# Patient Record
Sex: MALE | Race: WHITE | NOT HISPANIC OR LATINO | Employment: FULL TIME | URBAN - METROPOLITAN AREA
[De-identification: names, ages, dates, MRNs, and addresses within clinical notes are randomized per-mention and may not be internally consistent; named-entity substitution may affect disease eponyms.]

---

## 2017-01-19 DIAGNOSIS — Z86.718 PERSONAL HISTORY OF DVT (DEEP VEIN THROMBOSIS): Primary | ICD-10-CM

## 2017-01-19 DIAGNOSIS — D68.61 ANTIPHOSPHOLIPID ANTIBODY SYNDROME (H): ICD-10-CM

## 2017-03-14 ENCOUNTER — TELEPHONE (OUTPATIENT)
Dept: FAMILY MEDICINE | Facility: CLINIC | Age: 27
End: 2017-03-14

## 2017-03-14 NOTE — TELEPHONE ENCOUNTER
Spoke with VINAY BernalMisenheimer , ph# [phone].  ID# [id number]  PA approved for Amphetamine 10mg for 36 months.  [facility] pharmacy ph#[phone] notified  .

## 2017-03-24 DIAGNOSIS — I10 BENIGN ESSENTIAL HYPERTENSION: ICD-10-CM

## 2017-03-24 RX ORDER — LOSARTAN POTASSIUM 50 MG/1
50 TABLET ORAL DAILY
Start: 2017-03-24

## 2017-03-24 RX ORDER — HYDROCHLOROTHIAZIDE 25 MG/1
25 TABLET ORAL DAILY
Start: 2017-03-24

## 2017-03-24 NOTE — TELEPHONE ENCOUNTER
Original Rx for both [facility] [address]  [facility].  Informed them where original Rx sent and to transfer if patient requests.  Kaylie Wilde RN

## 2017-03-24 NOTE — TELEPHONE ENCOUNTER
Lasartan Potassium 50MG      Last Written Prescription Date: 11/9/16  Last Fill Quantity: 90, # refills: 3  Last Office Visit with G, [facility] or [facility] prescribing provider: 12/16/16       Potassium   Date Value Ref Range Status   12/16/2016 3.9 3.4 - 5.3 mmol/L Final     Creatinine   Date Value Ref Range Status   12/16/2016 0.97 0.66 - 1.25 mg/dL Final     BP Readings from Last 3 Encounters:   12/16/16 116/80   11/09/16 (!) 142/92   08/18/16 128/82

## 2017-03-24 NOTE — TELEPHONE ENCOUNTER
Hydrochlorothiazide 25MG      Last Written Prescription Date: 12/16/16  Last Fill Quantity: 90, # refills: 3  Last Office Visit with G, P or [facility] prescribing provider: 12/16/16       Potassium   Date Value Ref Range Status   12/16/2016 3.9 3.4 - 5.3 mmol/L Final     Creatinine   Date Value Ref Range Status   12/16/2016 0.97 0.66 - 1.25 mg/dL Final     BP Readings from Last 3 Encounters:   12/16/16 116/80   11/09/16 (!) 142/92   08/18/16 128/82

## 2017-03-30 DIAGNOSIS — I10 BENIGN ESSENTIAL HYPERTENSION: ICD-10-CM

## 2017-03-30 RX ORDER — HYDROCHLOROTHIAZIDE 25 MG/1
TABLET ORAL
Start: 2017-03-30

## 2017-03-30 NOTE — TELEPHONE ENCOUNTER
Different pharmacy requesting refill  Denied  Rx sent to another [facility] in [facility] (ph [phone]) on 12/16/2016 for 1 year, please transfer if patient wants to fill in [place] GOMEZ RN

## 2017-04-11 ENCOUNTER — OFFICE VISIT (OUTPATIENT)
Dept: FAMILY MEDICINE | Facility: CLINIC | Age: 27
End: 2017-04-11
Payer: COMMERCIAL

## 2017-04-11 VITALS
OXYGEN SATURATION: 99 % | RESPIRATION RATE: 15 BRPM | DIASTOLIC BLOOD PRESSURE: 82 MMHG | HEART RATE: 94 BPM | TEMPERATURE: 98.4 F | SYSTOLIC BLOOD PRESSURE: 130 MMHG | BODY MASS INDEX: 36.01 KG/M2 | HEIGHT: 70 IN | WEIGHT: 251.5 LBS

## 2017-04-11 DIAGNOSIS — I10 BENIGN ESSENTIAL HYPERTENSION: ICD-10-CM

## 2017-04-11 DIAGNOSIS — F90.0 ATTENTION DEFICIT HYPERACTIVITY DISORDER (ADHD), PREDOMINANTLY INATTENTIVE TYPE: Primary | ICD-10-CM

## 2017-04-11 PROCEDURE — 99214 OFFICE O/P EST MOD 30 MIN: CPT | Performed by: FAMILY MEDICINE

## 2017-04-11 RX ORDER — DEXTROAMPHETAMINE SACCHARATE, AMPHETAMINE ASPARTATE, DEXTROAMPHETAMINE SULFATE AND AMPHETAMINE SULFATE 2.5; 2.5; 2.5; 2.5 MG/1; MG/1; MG/1; MG/1
10 TABLET ORAL 2 TIMES DAILY
Qty: 60 TABLET | Refills: 0 | Status: SHIPPED | OUTPATIENT
Start: 2017-06-15 | End: 2017-04-11

## 2017-04-11 RX ORDER — DEXTROAMPHETAMINE SACCHARATE, AMPHETAMINE ASPARTATE, DEXTROAMPHETAMINE SULFATE AND AMPHETAMINE SULFATE 2.5; 2.5; 2.5; 2.5 MG/1; MG/1; MG/1; MG/1
10 TABLET ORAL 2 TIMES DAILY
Qty: 60 TABLET | Refills: 0 | Status: SHIPPED | OUTPATIENT
Start: 2017-07-17 | End: 2017-04-11

## 2017-04-11 RX ORDER — HYDROCHLOROTHIAZIDE 25 MG/1
25 TABLET ORAL DAILY
Qty: 90 TABLET | Refills: 3 | Status: SHIPPED | OUTPATIENT
Start: 2017-04-11 | End: 2018-05-09

## 2017-04-11 RX ORDER — DEXTROAMPHETAMINE SACCHARATE, AMPHETAMINE ASPARTATE, DEXTROAMPHETAMINE SULFATE AND AMPHETAMINE SULFATE 2.5; 2.5; 2.5; 2.5 MG/1; MG/1; MG/1; MG/1
10 TABLET ORAL 2 TIMES DAILY
Qty: 60 TABLET | Refills: 0 | Status: SHIPPED | OUTPATIENT
Start: 2017-05-15 | End: 2017-04-11

## 2017-04-11 RX ORDER — DEXTROAMPHETAMINE SACCHARATE, AMPHETAMINE ASPARTATE, DEXTROAMPHETAMINE SULFATE AND AMPHETAMINE SULFATE 2.5; 2.5; 2.5; 2.5 MG/1; MG/1; MG/1; MG/1
10 TABLET ORAL 2 TIMES DAILY
Qty: 60 TABLET | Refills: 0 | Status: SHIPPED | OUTPATIENT
Start: 2017-08-17 | End: 2019-04-01

## 2017-04-11 NOTE — NURSING NOTE
"Chief Complaint   Patient presents with     Recheck Medication     HCTZ     /82  Pulse 94  Temp 98.4  F (36.9  C) (Oral)  Resp 15  Ht 5' 10\" (1.778 m)  Wt 251 lb 8 oz (114.1 kg)  SpO2 99%  BMI 36.09 kg/m2 Estimated body mass index is 36.09 kg/(m^2) as calculated from the following:    Height as of this encounter: 5' 10\" (1.778 m).    Weight as of this encounter: 251 lb 8 oz (114.1 kg).  bp completed using cuff size: large      left    Health Maintenance Due Pending Provider Review:  NONE    n/a    Norma Snyder MA  [facility]  "

## 2017-04-11 NOTE — MR AVS SNAPSHOT
After Visit Summary   4/11/2017    Tone Marley    MRN: [id number]           Patient Information     Date Of Birth          1990        Visit Information        Provider Department      4/11/2017 3:30 PM Yara Faust MD [facility]        Today's Diagnoses     Attention deficit hyperactivity disorder (ADHD), predominantly inattentive type    -  1    Benign essential hypertension           Follow-ups after your visit        Follow-up notes from your care team     Return in about 4 months (around 8/11/2017).      Who to contact     If you have questions or need follow up information about today's clinic visit or your schedule please contact [facility] directly at [phone].  Normal or non-critical lab and imaging results will be communicated to you by MyChart, letter or phone within 4 business days after the clinic has received the results. If you do not hear from us within 7 days, please contact the clinic through [employer] or phone. If you have a critical or abnormal lab result, we will notify you by phone as soon as possible.  Submit refill requests through [employer] or call your pharmacy and they will forward the refill request to us. Please allow 3 business days for your refill to be completed.          Additional Information About Your Visit        MyChart Information     [employer] gives you secure access to your electronic health record. If you see a primary care provider, you can also send messages to your care team and make appointments. If you have questions, please call your primary care clinic.  If you do not have a primary care provider, please call [phone] and they will assist you.        Care EveryWhere ID     This is your Care EveryWhere ID. This could be used by other organizations to access your [place] medical records  [id number]        Your Vitals Were     Pulse Temperature Respirations Height Pulse Oximetry BMI (Body Mass Index)    94  "98.4  F (36.9  C) (Oral) 15 5' 10\" (1.778 m) 99% 36.09 kg/m2       Blood Pressure from Last 3 Encounters:   04/11/17 130/82   12/16/16 116/80   11/09/16 (!) 142/92    Weight from Last 3 Encounters:   04/11/17 251 lb 8 oz (114.1 kg)   12/16/16 243 lb 11.2 oz (110.5 kg)   11/09/16 249 lb (112.9 kg)              Today, you had the following     No orders found for display         Today's Medication Changes          These changes are accurate as of: 4/11/17  6:07 PM.  If you have any questions, ask your nurse or doctor.               These medicines have changed or have updated prescriptions.        Dose/Directions    * amphetamine-dextroamphetamine 10 MG per tablet   Commonly known as:  ADDERALL   This may have changed:  Another medication with the same name was added. Make sure you understand how and when to take each.   Used for:  Attention deficit hyperactivity disorder (ADHD), predominantly inattentive type   Changed by:  Yara Faust MD        Dose:  10 mg   Take 1 tablet (10 mg) by mouth 2 times daily   Quantity:  60 tablet   Refills:  0       * amphetamine-dextroamphetamine 10 MG per tablet   Commonly known as:  ADDERALL   This may have changed:  You were already taking a medication with the same name, and this prescription was added. Make sure you understand how and when to take each.   Used for:  Attention deficit hyperactivity disorder (ADHD), predominantly inattentive type   Changed by:  Yara Faust MD        Dose:  10 mg   Start taking on:  8/17/2017   Take 1 tablet (10 mg) by mouth 2 times daily   Quantity:  60 tablet   Refills:  0       * Notice:  This list has 2 medication(s) that are the same as other medications prescribed for you. Read the directions carefully, and ask your doctor or other care provider to review them with you.         Where to get your medicines      These medications were sent to [facility]/pharmacy #[id number] - [place] - [address]  [address] [place] " [address]     Phone:  [phone]     hydrochlorothiazide 25 MG tablet         Some of these will need a paper prescription and others can be bought over the counter.  Ask your nurse if you have questions.     Bring a paper prescription for each of these medications     amphetamine-dextroamphetamine 10 MG per tablet                Primary Care Provider Office Phone # Fax #    Yara Elvis Faust [phone]       [facility] [address] [address]        Thank you!     Thank you for choosing [facility]  for your care. Our goal is always to provide you with excellent care. Hearing back from our patients is one way we can continue to improve our services. Please take a few minutes to complete the written survey that you may receive in the mail after your visit with us. Thank you!             Your Updated Medication List - Protect others around you: Learn how to safely use, store and throw away your medicines at www.disposemymeds.org.          This list is accurate as of: 4/11/17  6:07 PM.  Always use your most recent med list.                   Brand Name Dispense Instructions for use    * amphetamine-dextroamphetamine 10 MG per tablet    ADDERALL    60 tablet    Take 1 tablet (10 mg) by mouth 2 times daily       * amphetamine-dextroamphetamine 10 MG per tablet   Start taking on:  8/17/2017    ADDERALL    60 tablet    Take 1 tablet (10 mg) by mouth 2 times daily       COMPRESSION STOCKINGS     2 each    Please wear compression stockings in both lower extremities most time during the day and take them off at night.       hydrochlorothiazide 25 MG tablet    HYDRODIURIL    90 tablet    Take 1 tablet (25 mg) by mouth daily       losartan 50 MG tablet    COZAAR    90 tablet    Take 1 tablet (50 mg) by mouth daily       rivaroxaban ANTICOAGULANT 20 MG Tabs tablet    XARELTO    30 tablet    Take 1 tablet (20 mg) by mouth daily (with dinner)       * Notice:  This  list has 2 medication(s) that are the same as other medications prescribed for you. Read the directions carefully, and ask your doctor or other care provider to review them with you.

## 2017-04-11 NOTE — PROGRESS NOTES
"  SUBJECTIVE:                                                    Tone Marley is a 27 year old male who presents to clinic today for the following health issues:  History of attention deficit hyperactivity disorder  Need refill on adderall in a month. 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  Had positive antiphospholipid but rest of the lupus marker were negative  History od deep venous thrombosis now on xeralto  Has plans to go over  The  need for  plaquenil with rheumatologist      Chief Complaint   Patient presents with     Recheck Medication         Current Outpatient Prescriptions   Medication     hydrochlorothiazide (HYDRODIURIL) 25 MG tablet     rivaroxaban ANTICOAGULANT (XARELTO) 20 MG TABS tablet     amphetamine-dextroamphetamine (ADDERALL) 10 MG tablet     losartan (COZAAR) 50 MG tablet     COMPRESSION STOCKINGS     [DISCONTINUED] hydrochlorothiazide (HYDRODIURIL) 25 MG tablet     No current facility-administered medications for this visit.        Past medical and family history,medications, allergies and problem list reviewed       ROS: 10 point ROS neg other than the symptoms noted above in the HPI.    EXAM:/82  Pulse 94  Resp 15  Ht 5' 10\" (1.778 m)  Wt 251 lb 8 oz (114.1 kg)  SpO2 99%  BMI 36.09 kg/m2  Constitutional: healthy, alert and no distress   Head: Normocephalic. No masses, lesions, " tenderness or abnormalities  Cardiovascular: negative,  RRR. No murmurs, clicks gallops or rub  Respiratory:  Lungs clear.no wheezing, crackles.  Abdomen: Abdomen soft, non-tender.no organomegaly, No CVA tenderness.  NEURO: Gait normal. Reflexes normal and symmetric. Sensation grossly WNL.  Psychiatric: mentation appears normal and affect normal/bright    ASSESSMENT / PLAN:  (I10) Benign essential hypertension    Plan: hydrochlorothiazide (HYDRODIURIL) 25 MG tablet once daily  Refilled for 1 year  We also discussed changes in wt- he reports its been due to eating out more  He reports he has moved  In with his GF and they have plans to get better idea with healthy eating and making food at [facility]- a trail of blue [employer]ron         (F90.0) Attention deficit hyperactivity disorder (ADHD), (primary encounter diagnosis)    predominantly inattentive type (primary encounter diagnosis) stable at Adderall 10 mg BID  Return office visit  q 4 months   amphetamine-dextroamphetamine (ADDERALL) 10 MG twice daily-gives himself medications free day and sometimes ablle to get by with once daily dose  Max tab 60/month  MNPMP-completed - no concerns     antiphospholipid syndrome  History of deep venous thrombosis- on long term Xeralto and no concerns  Has plans to follow up with the rheumatologist as plaquenil too expensive      Potential medication side effects were discussed with the patient; let me know if any occur.    The patient indicates understanding of these issues and agrees with the plan.      Yara Faust

## 2017-05-02 ENCOUNTER — APPOINTMENT (OUTPATIENT)
Dept: CT IMAGING | Facility: CLINIC | Age: 27
End: 2017-05-02
Attending: NURSE PRACTITIONER
Payer: COMMERCIAL

## 2017-05-02 ENCOUNTER — HOSPITAL ENCOUNTER (EMERGENCY)
Facility: CLINIC | Age: 27
Discharge: HOME | End: 2017-05-02
Attending: NURSE PRACTITIONER | Admitting: NURSE PRACTITIONER
Payer: COMMERCIAL

## 2017-05-02 VITALS
SYSTOLIC BLOOD PRESSURE: 149 MMHG | BODY MASS INDEX: 34.27 KG/M2 | TEMPERATURE: 98.4 F | HEART RATE: 83 BPM | HEIGHT: 72 IN | OXYGEN SATURATION: 97 % | DIASTOLIC BLOOD PRESSURE: 95 MMHG | WEIGHT: 253 LBS | RESPIRATION RATE: 22 BRPM

## 2017-05-02 DIAGNOSIS — R06.02 SHORTNESS OF BREATH: ICD-10-CM

## 2017-05-02 LAB
ANION GAP SERPL CALCULATED.3IONS-SCNC: 9 MMOL/L (ref 3–14)
BUN SERPL-MCNC: 18 MG/DL (ref 7–30)
CALCIUM SERPL-MCNC: 9.4 MG/DL (ref 8.5–10.1)
CHLORIDE SERPL-SCNC: 102 MMOL/L (ref 94–109)
CO2 SERPL-SCNC: 28 MMOL/L (ref 20–32)
CREAT SERPL-MCNC: 0.87 MG/DL (ref 0.66–1.25)
ERYTHROCYTE [DISTWIDTH] IN BLOOD BY AUTOMATED COUNT: 13.1 % (ref 10–15)
GFR SERPL CREATININE-BSD FRML MDRD: ABNORMAL ML/MIN/1.7M2
GLUCOSE SERPL-MCNC: 102 MG/DL (ref 70–99)
HCT VFR BLD AUTO: 42.4 % (ref 40–53)
HGB BLD-MCNC: 15.4 G/DL (ref 13.3–17.7)
INTERPRETATION ECG - MUSE: NORMAL
MCH RBC QN AUTO: 31 PG (ref 26.5–33)
MCHC RBC AUTO-ENTMCNC: 36.3 G/DL (ref 31.5–36.5)
MCV RBC AUTO: 86 FL (ref 78–100)
PLATELET # BLD AUTO: 196 10E9/L (ref 150–450)
POTASSIUM SERPL-SCNC: 3.6 MMOL/L (ref 3.4–5.3)
RBC # BLD AUTO: 4.96 10E12/L (ref 4.4–5.9)
SODIUM SERPL-SCNC: 139 MMOL/L (ref 133–144)
WBC # BLD AUTO: 4.8 10E9/L (ref 4–11)

## 2017-05-02 PROCEDURE — 25500064 ZZH RX 255 OP 636: Performed by: NURSE PRACTITIONER

## 2017-05-02 PROCEDURE — 93005 ELECTROCARDIOGRAM TRACING: CPT

## 2017-05-02 PROCEDURE — 71260 CT THORAX DX C+: CPT

## 2017-05-02 PROCEDURE — 85027 COMPLETE CBC AUTOMATED: CPT | Performed by: NURSE PRACTITIONER

## 2017-05-02 PROCEDURE — 80048 BASIC METABOLIC PNL TOTAL CA: CPT | Performed by: NURSE PRACTITIONER

## 2017-05-02 PROCEDURE — 25000125 ZZHC RX 250: Performed by: NURSE PRACTITIONER

## 2017-05-02 PROCEDURE — 99285 EMERGENCY DEPT VISIT HI MDM: CPT | Mod: 25

## 2017-05-02 RX ORDER — IOPAMIDOL 755 MG/ML
80 INJECTION, SOLUTION INTRAVASCULAR ONCE
Status: COMPLETED | OUTPATIENT
Start: 2017-05-02 | End: 2017-05-02

## 2017-05-02 RX ADMIN — SODIUM CHLORIDE 100 ML: 9 INJECTION, SOLUTION INTRAVENOUS at 17:52

## 2017-05-02 RX ADMIN — IOPAMIDOL 80 ML: 755 INJECTION, SOLUTION INTRAVENOUS at 17:55

## 2017-05-02 ASSESSMENT — ENCOUNTER SYMPTOMS
SHORTNESS OF BREATH: 1
LIGHT-HEADEDNESS: 0
DIAPHORESIS: 0

## 2017-05-02 NOTE — ED AVS SNAPSHOT
Emergency Department    [address] [place] [address]    Phone:  [phone]    Fax:  [phone]                                       Tone Marley   MRN: [id number]    Department:   Emergency Department   Date of Visit:  5/2/2017           After Visit Summary Signature Page     I have received my discharge instructions, and my questions have been answered. I have discussed any challenges I see with this plan with the nurse or doctor.    ..........................................................................................................................................  Patient/Patient Representative Signature      ..........................................................................................................................................  Patient Representative Print Name and Relationship to Patient    ..................................................               ................................................  Date                                            Time    ..........................................................................................................................................  Reviewed by Signature/Title    ...................................................              ..............................................  Date                                                            Time

## 2017-05-02 NOTE — ED PROVIDER NOTES
History     Chief Complaint:  Shortness of Breath    HPI   Tone Marley is a 27 year old male with a history of DVT/PE, currently taking Xarelto, who presents with shortness of breath. Tone has an antiphospholipid genetic predisposition for DVT's. The patient states that he was recently out in [place] hiking in the mountains for a week. While hiking he developed shortness of breath at altitude. When he came home two days ago he missed a day of Xarelto. Due to continued shortness of breath Tone was prompted to come into the ER for evaluation. He does state that symptoms have improved mildly. Here in the ER the patient endorses some left leg discomfort no swelling. His right leg is where all of his prior DVT's have been.  He does wear compression socks. He denies chest pain, palpitatipons, light headedness and diaphoresis.     Allergies:  The patient has no known drug allergies.     Medications:    Hydrodiuril  Xarelto  Cozaar  Adderall    Past Medical History:    Chronic anticoagulation  Postphlebitic syndrome  PE  Hyperlipidemia  Antiphospholipid syndrome  HTN  ADHD     Past Surgical History:    [place] teeth removal    Family History:    Asthma    Social History:  Relationship status: single  Tobacco use: neg  Alcohol use: pos  The patient presents with his girlfriend.     Review of Systems   Constitutional: Negative for diaphoresis.   Respiratory: Positive for shortness of breath.    Cardiovascular: Negative for chest pain and leg swelling.   Neurological: Negative for light-headedness.   All other systems reviewed and are negative.    Physical Exam   First Vitals:  BP: (!) 157/104  Pulse: 96  Temp: 98.4  F (36.9  C)  Resp: 22  Height: 182.9 cm (6')  Weight: 114.8 kg (253 lb)  SpO2: 98 %      Patient Vitals for the past 24 hrs:   BP Temp Temp src Pulse Resp SpO2 Height Weight   05/02/17 1859 (!) 149/95 - - - - 97 % - -   05/02/17 1712 (!) 157/104 98.4  F (36.9  C) Oral 96 22 98 % 1.829 m (6') 114.8 kg  (253 lb)       Physical Exam  General: Alert, No obvious discomfort, well kept  Eyes: PERRL, conjunctivae pink no scleral icterus or conjunctival injection  ENT:   Moist mucus membranes, posterior oropharynx clear without erythema or exudates, No lymphadenopathy, Normal voice  Resp:  Lungs clear to auscultation bilaterally, no crackles/rubs/wheezes. Good air movement  CV:  Normal rate and rhythm, no murmurs/rubs/gallops  GI:  Abdomen soft and non-distended.  Normoactive BS.  No tenderness, guarding or rebound, No masses  Skin:  Warm, dry.  No rashes or petechiae  Musculoskeletal: No peripheral edema or calf tenderness, Normal gross ROM   Neuro: Alert and oriented to person/place/time, normal sensation  Psychiatric: Normal affect, cooperative, good eye contact    Emergency Department Course     Imaging:  Radiographic findings were communicated with the patient who voiced understanding of the findings.  Chest CT Pulmonary Embolism, with contrast, per radiology:  1. Chronic mass or filling defect in the left lower lobe pulmonary artery, unchanged on three separate exams dating back to 2013. No evidence of acute pulmonary embolus.  2. Two small right lung nodules have been stable for more than two years and are considered benign.  3. Borderline cardiomegaly.    Laboratory:  CBC: WNL (WBC 4.8, HGB 15.4, )   CMP: glucose 102 (H), o/w WNL (Creatinine 0.87)     Emergency Department Course:  Nursing notes and vitals reviewed.  I performed an exam of the patient as documented above.  The above workup was undertaken.  1833: I rechecked the patient and discussed results.  Findings and plan explained to the Patient. Patient discharged home with instructions regarding supportive care, medications, and reasons to return. The importance of close follow-up was reviewed.    Impression & Plan      Medical Decision Making:  Tone Marley is a 27 year old male who presented for evaluation or shortness of breath. He has  antiphospholipid syndrome and he has had multiple DVT's and PE's in the past. He just returned from [place] and he has had shortness of breath so he became concerned for a new PE. His examination today shows normal vital signs. Laboratory tests are noncontributory. CT was obtained without indication for any acute process. He continues to have a chronic mass filling defect as well as unchanged lung nodes from the last two years. There is no indication for pneumonia, pneumothorax or pleural effusion. This may represent viral illness versus allergy symptoms. The patient does not have fevers. This is unlikely to represent any infection. History and physical unlikely to represent ACS. He will follow up with PCP in 2-3 days time if no improvements. He will return to the ER with increased shortness of breath, fevers or other concerns.     Diagnosis:    ICD-10-CM   1. Shortness of breath R06.02     Disposition:  Discharge to home with primary care follow up.    Jayden ARGUETA, am serving as a scribe on 5/2/2017 at 5:21 PM to personally document services performed by Jake Salcedo APRN, based on my observations and the provider's statements to me.     EMERGENCY DEPARTMENT       Jake Salcedo APRN CNP  05/02/17 1911

## 2017-05-02 NOTE — DISCHARGE INSTRUCTIONS
Shortness of Breath (Dyspnea)  Shortness of breath is the feeling that you can't catch your breath or get enough air. It is also known as dyspnea.  Dyspnea can be caused by many different conditions. They include:    Acute asthma attack.    Worsening of chronic lung diseases such as chronic bronchitis and emphysema.     Heart failure. This is when weak heart muscle allows extra fluid to collect in the lungs.    Panic attacks or anxiety. Fear can cause rapid breathing (hyperventilation).    Pneumonia, or an infection in the lung tissue.    Exposure to toxic substances, fumes, smoke, or certain medicines.    Blood clot in the lung (pulmonary embolism). This is often from a piece of blood clot in a deep vein of the leg (deep vein thrombosis) that breaks off and travels to the lungs.     Heart attack or heart-related chest pain (angina).    Anemia.    Collapsed lung (pneumothorax).    Dehydration.    Pregnancy.  Based on your visit today, the exact cause of your shortness of breath is not certain. Your tests don t show any of the serious causes of dyspnea. You may need other tests to find out if you have a serious problem. It s important to watch for any new symptoms or symptoms that get worse. Follow up with your healthcare provider as directed.  Home care  Follow these tips to take care of yourself at home:    When your symptoms are better, go back to your usual activities.    If you smoke, you should stop. Join a quit-smoking program or ask your healthcare provider for help.    Eat a healthy diet and get plenty of sleep.    Get regular exercise. Talk with your healthcare provider before starting to exercise, especially if you have other medical problems.    Cut down on the amount of caffeine and stimulants you consume.  Follow-up care  Follow up with your healthcare provider, or as advised.  If tests were done, you will be told if your treatment needs to be changed. You can call as directed for the  results.  (Note: If an X-ray was taken, a specialist will review it. You will be notified of any new findings that may affect your care.)  Call 911 or get immediate medical care  Shortness of breath may be a sign of a serious medical problem. For example, it may be a problem with your heart or lungs. Call 911 if you have worsening shortness of breath or trouble breathing, especially with any of the symptoms below:    You are confused or it s difficult to wake you.    You faint or lose consciousness.    You have a fast heartbeat, or your heartbeat is irregular.     You are coughing up blood.    You have pain in your chest, arm, shoulder, neck, or upper back.    You break out in a sweat.  When to seek medical advice  Call your healthcare provider right away if any of these occur:    Slight shortness of breath or wheezing     Redness, pain or swelling in your leg, arm, or other body area    Swelling in both legs or ankles    Fast weight gain    Dizziness or weakness    Fever of 100.4 F (38 C) or higher, or as directed by your healthcare provider    4648-8381 [employer]. [address] [place] [address]. All rights reserved. This information is not intended as a substitute for professional medical care. Always follow your healthcare professional's instructions.

## 2017-05-02 NOTE — LETTER
EMERGENCY DEPARTMENT  [address] [address]  [phone]    May 2, 2017    Tone Marley  [address] [address]  [phone] (home)     : 1990      To Whom it may concern:    Tone Marley was seen in our Emergency Department today, May 2, 2017. Possible viral illness. He may return to work/school.    Sincerely,        Jake Salcedo

## 2017-05-22 DIAGNOSIS — Z86.718 PERSONAL HISTORY OF DVT (DEEP VEIN THROMBOSIS): ICD-10-CM

## 2017-05-22 DIAGNOSIS — D68.61 ANTIPHOSPHOLIPID ANTIBODY SYNDROME (H): ICD-10-CM

## 2017-06-01 ENCOUNTER — OFFICE VISIT (OUTPATIENT)
Dept: HEMATOLOGY | Facility: CLINIC | Age: 27
End: 2017-06-01
Attending: PHYSICIAN ASSISTANT
Payer: COMMERCIAL

## 2017-06-01 VITALS
BODY MASS INDEX: 34.15 KG/M2 | OXYGEN SATURATION: 97 % | HEART RATE: 87 BPM | DIASTOLIC BLOOD PRESSURE: 80 MMHG | TEMPERATURE: 98.8 F | SYSTOLIC BLOOD PRESSURE: 127 MMHG | WEIGHT: 251.8 LBS

## 2017-06-01 DIAGNOSIS — I10 BENIGN ESSENTIAL HYPERTENSION: ICD-10-CM

## 2017-06-01 DIAGNOSIS — Z79.01 CHRONIC ANTICOAGULATION: ICD-10-CM

## 2017-06-01 DIAGNOSIS — D68.61 ANTIPHOSPHOLIPID ANTIBODY SYNDROME (H): Primary | ICD-10-CM

## 2017-06-01 DIAGNOSIS — Z86.711 HISTORY OF PULMONARY EMBOLISM: ICD-10-CM

## 2017-06-01 LAB
ALBUMIN SERPL-MCNC: 3.8 G/DL (ref 3.4–5)
ALP SERPL-CCNC: 104 U/L (ref 40–150)
ALT SERPL W P-5'-P-CCNC: 86 U/L (ref 0–70)
AST SERPL W P-5'-P-CCNC: 39 U/L (ref 0–45)
BILIRUB DIRECT SERPL-MCNC: 0.2 MG/DL (ref 0–0.2)
BILIRUB SERPL-MCNC: 0.8 MG/DL (ref 0.2–1.3)
PROT SERPL-MCNC: 7.9 G/DL (ref 6.8–8.8)

## 2017-06-01 PROCEDURE — 36415 COLL VENOUS BLD VENIPUNCTURE: CPT | Performed by: PHYSICIAN ASSISTANT

## 2017-06-01 PROCEDURE — 99211 OFF/OP EST MAY X REQ PHY/QHP: CPT

## 2017-06-01 PROCEDURE — 80076 HEPATIC FUNCTION PANEL: CPT | Performed by: PHYSICIAN ASSISTANT

## 2017-06-01 PROCEDURE — 99212 OFFICE O/P EST SF 10 MIN: CPT | Performed by: PHYSICIAN ASSISTANT

## 2017-06-01 ASSESSMENT — PAIN SCALES - GENERAL: PAINLEVEL: NO PAIN (0)

## 2017-06-01 NOTE — MR AVS SNAPSHOT
After Visit Summary   6/1/2017    Tone Marley    MRN: [id number]           Patient Information     Date Of Birth          1990        Visit Information        Provider Department      6/1/2017 10:00 AM Roque Kidd PA-C [facility] [place] [facility]      Today's Diagnoses     Antiphospholipid antibody syndrome (H)    -  1    Chronic anticoagulation        Benign essential hypertension        History of pulmonary embolism           Follow-ups after your visit        Future tests that were ordered for you today     Open Future Orders        Priority Expected Expires Ordered    Comprehensive metabolic panel Routine 12/1/2017 6/1/2018 6/1/2017            Who to contact     If you have questions or need follow up information about today's clinic visit or your schedule please contact [facility] [place] directly at [phone].  Normal or non-critical lab and imaging results will be communicated to you by MyChart, letter or phone within 4 business days after the clinic has received the results. If you do not hear from us within 7 days, please contact the clinic through MyChart or phone. If you have a critical or abnormal lab result, we will notify you by phone as soon as possible.  Submit refill requests through [employer] or call your pharmacy and they will forward the refill request to us. Please allow 3 business days for your refill to be completed.          Additional Information About Your Visit        MyChart Information     [employer] gives you secure access to your electronic health record. If you see a primary care provider, you can also send messages to your care team and make appointments. If you have questions, please call your primary care clinic.  If you do not have a primary care provider, please call [phone] and they will assist you.        Care EveryWhere ID     This is your Care EveryWhere ID. This could be used by other organizations to access your  [place] medical records  [id number]        Your Vitals Were     Pulse Temperature Pulse Oximetry BMI (Body Mass Index)          87 98.8  F (37.1  C) (Oral) 97% 34.15 kg/m2         Blood Pressure from Last 3 Encounters:   06/01/17 127/80   05/02/17 (!) 149/95   04/11/17 130/82    Weight from Last 3 Encounters:   06/01/17 114.2 kg (251 lb 12.8 oz)   05/02/17 114.8 kg (253 lb)   04/11/17 114.1 kg (251 lb 8 oz)              We Performed the Following     Hepatic panel        Primary Care Provider Office Phone # Fax #    Yara Faust [phone]       [address] [address]        Thank you!     Thank you for choosing [facility]  for your care. Our goal is always to provide you with excellent care. Hearing back from our patients is one way we can continue to improve our services. Please take a few minutes to complete the written survey that you may receive in the mail after your visit with us. Thank you!             Your Updated Medication List - Protect others around you: Learn how to safely use, store and throw away your medicines at www.disposemymeds.org.          This list is accurate as of: 6/1/17  1:01 PM.  Always use your most recent med list.                   Brand Name Dispense Instructions for use    * amphetamine-dextroamphetamine 10 MG per tablet    ADDERALL    60 tablet    Take 1 tablet (10 mg) by mouth 2 times daily       * amphetamine-dextroamphetamine 10 MG per tablet   Start taking on:  8/17/2017    ADDERALL    60 tablet    Take 1 tablet (10 mg) by mouth 2 times daily       COMPRESSION STOCKINGS     2 each    Please wear compression stockings in both lower extremities most time during the day and take them off at night.       hydrochlorothiazide 25 MG tablet    HYDRODIURIL    90 tablet    Take 1 tablet (25 mg) by mouth daily       losartan 50 MG tablet    COZAAR    90 tablet    Take 1 tablet (50 mg) by mouth daily        rivaroxaban ANTICOAGULANT 20 MG Tabs tablet    XARELTO    30 tablet    Take 1 tablet (20 mg) by mouth daily (with dinner)       * Notice:  This list has 2 medication(s) that are the same as other medications prescribed for you. Read the directions carefully, and ask your doctor or other care provider to review them with you.

## 2017-06-01 NOTE — LETTER
2017      RE: Tone Marley  [address]  [facility] [address]       [facility]  [address] [place] [address], [facility] [address], [address], [place] [address]  Phone: [phone], Fax: [phone].    Patient seen at: [facility].    Outpatient Visit Note:    Patient: Tone Marley  MRN: [id number]  : 1990  CAROLINE: 2017    Reason:  History of recurrent DVT and PE. APLS. On Chronic anticoagulation therapy. Here for his routine annual follow up visit.     Brief History:  This is a 27 year old male who suffered from a massive lower extremity DVT involving the right iliac vein and large left sided pulmonary embolism back when he was 13 years old and found to have antiphospholipid antibody syndrome (APLS), who is currently on long term anticoagulation therapy, returns to clinic for his routine annual follow up clinic visit. He was last seen by me back in 2016, please see my previous notes for his detail clinical history. In brief, back in , he suffered a deep vein thrombosis in the right external iliac vein with left lower lobe pulmonary embolism. In , he again suffered another DVT in his left lower extremity and was found to have APLS with his Lupus Anticoagulant being persistently positive. He was placed on Coumadin at the time. During our last visit with him back in May 2009, he was not quite compliant with his Coumadin monitoring. Eventually he was followed by the [facility] Coumadin monitoring clinic and he was doing better.      Because of his history of APLS, historically his Coumadin dosing was adjusted based on Factor II levels with a goal range of 15-25%.      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 At that time, we had him establish care with a hematologist in [facility].      He has maintained on Coumadin from  to . According to the patient, he only  had his INR monitored and was not utilizing Factor 2 or Chromogenic Factor X levels for monitoring. Then in Sept 2013, he apparently developed significant chest pain on the left side. He eventually was admitted and was evaluated by Dr. Antonio Turcios (Hematologist at [facility] in [place]). At the time, the patient was on Coumadin with his INR level within the 2.0-3.0 range. However, according to Dr. Turcios, his Factor II level was sub-therapeutic in the 50's%. At the time, a CT revealed large left lower lobe clot burden. Because of concern of Coumadin failure, the patient was subsequently placed on Enoxaparin for a few months. During a follow up visit with Dr. Turcios in early 2014, Dr. Turcios recommended that Tone should consider switching his anticoagulation therapy to Xarelto at 20 mg PO Qday. He has maintained on this since.      Tone moved back to [place] about 2 years ago and has since re-established care with us.      After our visit with him back in Feb 2014, we referred him to see Rheumatologist here at the [facility] in regard to Plaquenil use. The recommendation was to have him continue Plaquenil. We also referred the patient to see [facility], Dr. Jose Angel Nina, and the patient was recommended to be evaluated by a pulmonologist and potentially needing a right heart catheterization. Then back in 1/11/2015, he was admitted to [facility] for hemoptysis. Apparently retrospectively, he was apparently staying at a friend's basement at the time and apparently for some reason, the dryer, which was located in the basement, was found to have the exhaust pipe exiting within the basement. Hence he speculated that he had developed a severe coughing episode at the time leading up to his episode of hemoptysis.      Interim History:  Since I last saw him back in March 2016, he had continued with Xarelto with no bleeding complications. He has no issues with recurrent thromboembolism. He recently was in  [place] and developed some shortness of breath. He seek emergency department medical attention on 5/2/2017 for his shortness of breath here at the [facility]. A CT Chest was done showing chronic mass or filling defect in the left lower lobe pulmonary artery. This has not changed on three separate exams dating back to 2013. The patient was told to take Zyrtec and was discharged home. He eventually took the advise and took one tablet of Zyrtec and within one hour, his symptoms entirely resolved.     Denies any frequent nose bleeds. No issues with gum bleeding. Denies any hematuria or blood in stools. Denies any chest pain. No shortness of breath. Denies any swelling of the lower extremities. He has been wearing on and off compression stockings.     He recently discontinue Plaquenil on his own due to expensive cost of the medication. He has not seen his Rheumatologist in regard to this as of yet.    Medications:  Current Outpatient Prescriptions   Medication     rivaroxaban ANTICOAGULANT (XARELTO) 20 MG TABS tablet     hydrochlorothiazide (HYDRODIURIL) 25 MG tablet     [START ON 8/17/2017] amphetamine-dextroamphetamine (ADDERALL) 10 MG per tablet     amphetamine-dextroamphetamine (ADDERALL) 10 MG tablet     losartan (COZAAR) 50 MG tablet     COMPRESSION STOCKINGS     No current facility-administered medications for this visit.      Allergies:     Allergies   Allergen Reactions     Nkda [No Known Drug Allergies]      PmHx:  Past Medical History:   Diagnosis Date     ADHD (attention deficit hyperactivity disorder) 6/27/2012     Antiphospholipid antibody syndromes     at age 13     Antiphospholipid syndrome (H) 2/2/2012    On anticoagulation coumdin 10 mg once daily       History of pulmonary embolism 9/2/2013    Was diagnosed  Positive CT scan in emergency department   now Under care of Dr Turcios at [facility] at [facility],[phone] His nurse personal line [phone] Was on lovenox for 5 months  Now  For  past one month on xareltlo 20 mg once daily and plaqunil twice daily       Hyperlipidemia LDL goal <130 6/11/2013     Hypertension goal BP (blood pressure) < 140/90 6/19/12     Hypertension, uncontrolled 6/27/2012     Prehypertension 2/2/2012     Pulmonary embolism (H)     at age 13     Social History:  He is currently a pharmacist at [employer].     Family History:  Deferred.    Objective:  Pleasant in no acute distress.  Vitals: /80  Pulse 87  Temp 98.8  F (37.1  C) (Oral)  Wt 114.2 kg (251 lb 12.8 oz)  SpO2 97%  BMI 34.15 kg/m2  Exam:  Complete exam is not performed today.    Labs:    Component      Latest Ref Rng & Units 5/2/2017   Sodium      133 - 144 mmol/L 139   Potassium      3.4 - 5.3 mmol/L 3.6   Chloride      94 - 109 mmol/L 102   Carbon Dioxide      20 - 32 mmol/L 28   Anion Gap      3 - 14 mmol/L 9   Glucose      70 - 99 mg/dL 102 (H)   Urea Nitrogen      7 - 30 mg/dL 18   Creatinine      0.66 - 1.25 mg/dL 0.87   GFR Estimate      >60 mL/min/1.7m2 >90 . . .   GFR Estimate If Black      >60 mL/min/1.7m2 >90 . . .   Calcium      8.5 - 10.1 mg/dL 9.4   WBC      4.0 - 11.0 10e9/L 4.8   RBC Count      4.4 - 5.9 10e12/L 4.96   Hemoglobin      13.3 - 17.7 g/dL 15.4   Hematocrit      40.0 - 53.0 % 42.4   MCV      78 - 100 fl 86   MCH      26.5 - 33.0 pg 31.0   MCHC      31.5 - 36.5 g/dL 36.3   RDW      10.0 - 15.0 % 13.1   Platelet Count      150 - 450 10e9/L 196     Component      Latest Ref Rng & Units 1/11/2015   Sodium      133 - 144 mmol/L 135   Potassium      3.4 - 5.3 mmol/L 3.7   Chloride      94 - 109 mmol/L 108   Carbon Dioxide      20 - 32 mmol/L 28   Anion Gap      3 - 14 mmol/L <1 (L)   Glucose      70 - 99 mg/dL 87   Urea Nitrogen      7 - 30 mg/dL 11   Creatinine      0.66 - 1.25 mg/dL 0.96   GFR Estimate      >60 mL/min/1.7m2 >90 . . .   GFR Estimate If Black      >60 mL/min/1.7m2 >90 . . .   Calcium      8.5 - 10.1 mg/dL 8.6   Bilirubin Total      0.2 - 1.3 mg/dL 0.6   Albumin      3.4 -  5.0 g/dL 3.6   Protein Total      6.8 - 8.8 g/dL 6.7 (L)   Alkaline Phosphatase      40 - 150 U/L 87   ALT      0 - 70 U/L 74 (H)   AST      0 - 45 U/L 24       Imaging:  CT Chest on 5/2/2017:  1. Chronic mass or filling defect in the left lower lobe pulmonary artery, unchanged on three separate exams dating back to 2013. No evidence of acute pulmonary embolus. 2. Two small right lung nodules have been stable for more than two years and are considered benign. 3. Borderline cardiomegaly.    Assessment:  In summary, Tone is a 27 year old male with a history of recurrent lower extremity DVT and pulmonary embolism who also has Antiphospholipid Antibody Syndrome, currently on chronic anticoagulation therapy with Xarelto at 20 mg PO Qday, returns to clinic today for his routine annual follow up clinic visit. Tone in general is doing very well on Xarelto and reports that he has been very compliant with taking the medication. He has no bleeding issues with Xarelto use and more importantly has not had a recurrent venous thromboembolic event.     Diagnosis:  1. History of recurrent DVT/PE.  2. Antiphospholipid Antibody Syndrome (APLS).  3. Chronic anticoagulation therapy with Xarelto.  4. Hypertension.     Plan:  Tone remains to be a good candidate to continue extended anticoagulation therapy with Xarelto at 20 mg PO Qday. I have reiterate with him about scheduling a follow up clinic visit with Rheumatology about his self discontinuation of Plaquenil.     I will recheck his LFT's today.    He knows to contact our center if he should need any invasive or surgical procedures in the future. At which time, we can provide him with anticoagulation management plan surrounding these procedures.    Otherwise, I will plan on seeing him back annually.    Alejandra Escoto, nurse clinician is present throughout the entire clinic visit.     Total Time Spent:  14 minutes, all 14 minutes was spent on face-to-face consultation with the  patient and coordination of care in regard to his APLS, recurrent DVT/PE and anticoagulation therapy management.    Time IN: 10:04  Time OUT: 10:18      Roque Kidd PA-C, MPAS  Physician Assistant  [facility].

## 2017-06-01 NOTE — NURSING NOTE
Patient in clinic for a follow up bleeding/clotting visit. Pain 0/10. Vitals taken, allergies and medications reviewed. 5 minutes spent with patient.    Meagan Diaz CMA

## 2017-06-01 NOTE — NURSING NOTE
Tone Marley  MRN: [id number]  Male, 27 year old, 1990  APS, DVT/PE    Saw Tone with BRYAN Burgos today.  He is currently on long-term anticoagulation with Xarelto 20 mg daily.  He is having no bleeding issues. He stopped Plaquinil in November 2016 because of insurance changes & high out of pocket expenses.  He is planning on seeing Rheumatology in the near future to discuss this.  We are checking LFT's today to ensure proper clearance of Xarelto.  Kidney function in recent past was normal.  We will see him back in 1 year to discuss plan.  He is to call me with questions during the year.  Taryn Escoto, RN, MSN -Nurse Clinician, [facility]

## 2017-06-01 NOTE — PROGRESS NOTES
[facility]  [address] [place] [address] [address], [address] [address]  Phone: [phone], Fax: [phone].    Patient seen at: [facility].    Outpatient Visit Note:    Patient: Tone Marley  MRN: [id number]  : 1990  CAROLINE: 2017    Reason:  History of recurrent DVT and PE. APLS. On Chronic anticoagulation therapy. Here for his routine annual follow up visit.     Brief History:  This is a 27 year old male who suffered from a massive lower extremity DVT involving the right iliac vein and large left sided pulmonary embolism back when he was 13 years old and found to have antiphospholipid antibody syndrome (APLS), who is currently on long term anticoagulation therapy, returns to clinic for his routine annual follow up clinic visit. He was last seen by me back in 2016, please see my previous notes for his detail clinical history. In brief, back in , he suffered a deep vein thrombosis in the right external iliac vein with left lower lobe pulmonary embolism. In , he again suffered another DVT in his left lower extremity and was found to have APLS with his Lupus Anticoagulant being persistently positive. He was placed on Coumadin at the time. During our last visit with him back in May 2009, he was not quite compliant with his Coumadin monitoring. Eventually he was followed by the [facility] Coumadin monitoring clinic and he was doing better.      Because of his history of APLS, historically his Coumadin dosing was adjusted based on Factor II levels with a goal range of 15-25%.      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, [facility]. At that time, we had him establish care with a hematologist in [facility].      He has maintained on Coumadin from  to . According to the patient, he only had his INR monitored and was not utilizing Factor 2 or Chromogenic Factor X  levels for monitoring. Then in Sept 2013, he apparently developed significant chest pain on the left side. He eventually was admitted and was evaluated by Dr. Antonio Turcios (Hematologist at [facility] in [place]). At the time, the patient was on Coumadin with his INR level within the 2.0-3.0 range. However, according to Dr. Turcios, his Factor II level was sub-therapeutic in the 50's%. At the time, a CT revealed large left lower lobe clot burden. Because of concern of Coumadin failure, the patient was subsequently placed on Enoxaparin for a few months. During a follow up visit with Dr. Turcios in early 2014, Dr. Turcios recommended that Tone should consider switching his anticoagulation therapy to Xarelto at 20 mg PO Qday. He has maintained on this since.      Tone moved back to [place] about 2 years ago and has since re-established care with us.      After our visit with him back in Feb 2014, we referred him to see Rheumatologist here at the [facility] in regard to Plaquenil use. The recommendation was to have him continue Plaquenil. We also referred the patient to see [facility], Dr. Jose Angel Nina, and the patient was recommended to be evaluated by a pulmonologist and potentially needing a right heart catheterization. Then back in 1/11/2015, he was admitted to [facility] for hemoptysis. Apparently retrospectively, he was apparently staying at a friend's basement at the time and apparently for some reason, the dryer, which was located in the basement, was found to have the exhaust pipe exiting within the basement. Hence he speculated that he had developed a severe coughing episode at the time leading up to his episode of hemoptysis.      Interim History:  Since I last saw him back in March 2016, he had continued with Xarelto with no bleeding complications. He has no issues with recurrent thromboembolism. He recently was in [place] and developed some shortness of breath. He seek emergency department  medical attention on 5/2/2017 for his shortness of breath here at the [facility]. A CT Chest was done showing chronic mass or filling defect in the left lower lobe pulmonary artery. This has not changed on three separate exams dating back to 2013. The patient was told to take Zyrtec and was discharged home. He eventually took the advise and took one tablet of Zyrtec and within one hour, his symptoms entirely resolved.     Denies any frequent nose bleeds. No issues with gum bleeding. Denies any hematuria or blood in stools. Denies any chest pain. No shortness of breath. Denies any swelling of the lower extremities. He has been wearing on and off compression stockings.     He recently discontinue Plaquenil on his own due to expensive cost of the medication. He has not seen his Rheumatologist in regard to this as of yet.    Medications:  Current Outpatient Prescriptions   Medication     rivaroxaban ANTICOAGULANT (XARELTO) 20 MG TABS tablet     hydrochlorothiazide (HYDRODIURIL) 25 MG tablet     [START ON 8/17/2017] amphetamine-dextroamphetamine (ADDERALL) 10 MG per tablet     amphetamine-dextroamphetamine (ADDERALL) 10 MG tablet     losartan (COZAAR) 50 MG tablet     COMPRESSION STOCKINGS     No current facility-administered medications for this visit.      Allergies:     Allergies   Allergen Reactions     Nkda [No Known Drug Allergies]      PmHx:  Past Medical History:   Diagnosis Date     ADHD (attention deficit hyperactivity disorder) 6/27/2012     Antiphospholipid antibody syndromes     at age 13     Antiphospholipid syndrome (H) 2/2/2012    On anticoagulation coumdin 10 mg once daily       History of pulmonary embolism 9/2/2013    Was diagnosed  Positive CT scan in emergency department   now Under care of Dr Turcios at [facility],[phone] His nurse personal line [phone] Was on lovenox for 5 months  Now  For past one month on xareltlo 20 mg once daily and plaqunil twice daily        Hyperlipidemia LDL goal <130 6/11/2013     Hypertension goal BP (blood pressure) < 140/90 6/19/12     Hypertension, uncontrolled 6/27/2012     Prehypertension 2/2/2012     Pulmonary embolism (H)     at age 13     Social History:  He is currently a pharmacist at [employer].     Family History:  Deferred.    Objective:  Pleasant in no acute distress.  Vitals: /80  Pulse 87  Temp 98.8  F (37.1  C) (Oral)  Wt 114.2 kg (251 lb 12.8 oz)  SpO2 97%  BMI 34.15 kg/m2  Exam:  Complete exam is not performed today.    Labs:    Component      Latest Ref Rng & Units 5/2/2017   Sodium      133 - 144 mmol/L 139   Potassium      3.4 - 5.3 mmol/L 3.6   Chloride      94 - 109 mmol/L 102   Carbon Dioxide      20 - 32 mmol/L 28   Anion Gap      3 - 14 mmol/L 9   Glucose      70 - 99 mg/dL 102 (H)   Urea Nitrogen      7 - 30 mg/dL 18   Creatinine      0.66 - 1.25 mg/dL 0.87   GFR Estimate      >60 mL/min/1.7m2 >90 . . .   GFR Estimate If Black      >60 mL/min/1.7m2 >90 . . .   Calcium      8.5 - 10.1 mg/dL 9.4   WBC      4.0 - 11.0 10e9/L 4.8   RBC Count      4.4 - 5.9 10e12/L 4.96   Hemoglobin      13.3 - 17.7 g/dL 15.4   Hematocrit      40.0 - 53.0 % 42.4   MCV      78 - 100 fl 86   MCH      26.5 - 33.0 pg 31.0   MCHC      31.5 - 36.5 g/dL 36.3   RDW      10.0 - 15.0 % 13.1   Platelet Count      150 - 450 10e9/L 196     Component      Latest Ref Rng & Units 1/11/2015   Sodium      133 - 144 mmol/L 135   Potassium      3.4 - 5.3 mmol/L 3.7   Chloride      94 - 109 mmol/L 108   Carbon Dioxide      20 - 32 mmol/L 28   Anion Gap      3 - 14 mmol/L <1 (L)   Glucose      70 - 99 mg/dL 87   Urea Nitrogen      7 - 30 mg/dL 11   Creatinine      0.66 - 1.25 mg/dL 0.96   GFR Estimate      >60 mL/min/1.7m2 >90 . . .   GFR Estimate If Black      >60 mL/min/1.7m2 >90 . . .   Calcium      8.5 - 10.1 mg/dL 8.6   Bilirubin Total      0.2 - 1.3 mg/dL 0.6   Albumin      3.4 - 5.0 g/dL 3.6   Protein Total      6.8 - 8.8 g/dL 6.7 (L)   Alkaline  Phosphatase      40 - 150 U/L 87   ALT      0 - 70 U/L 74 (H)   AST      0 - 45 U/L 24       Imaging:  CT Chest on 5/2/2017:  1. Chronic mass or filling defect in the left lower lobe pulmonary artery, unchanged on three separate exams dating back to 2013. No evidence of acute pulmonary embolus. 2. Two small right lung nodules have been stable for more than two years and are considered benign. 3. Borderline cardiomegaly.    Assessment:  In summary, Tone is a 27 year old male with a history of recurrent lower extremity DVT and pulmonary embolism who also has Antiphospholipid Antibody Syndrome, currently on chronic anticoagulation therapy with Xarelto at 20 mg PO Qday, returns to clinic today for his routine annual follow up clinic visit. Tone in general is doing very well on Xarelto and reports that he has been very compliant with taking the medication. He has no bleeding issues with Xarelto use and more importantly has not had a recurrent venous thromboembolic event.     Diagnosis:  1. History of recurrent DVT/PE.  2. Antiphospholipid Antibody Syndrome (APLS).  3. Chronic anticoagulation therapy with Xarelto.  4. Hypertension.     Plan:  Tone remains to be a good candidate to continue extended anticoagulation therapy with Xarelto at 20 mg PO Qday. I have reiterate with him about scheduling a follow up clinic visit with Rheumatology about his self discontinuation of Plaquenil.     I will recheck his LFT's today.    He knows to contact our center if he should need any invasive or surgical procedures in the future. At which time, we can provide him with anticoagulation management plan surrounding these procedures.    Otherwise, I will plan on seeing him back annually.    Alejandra Escoto, nurse clinician is present throughout the entire clinic visit.     Total Time Spent:  14 minutes, all 14 minutes was spent on face-to-face consultation with the patient and coordination of care in regard to his APLS, recurrent DVT/PE  and anticoagulation therapy management.    Time IN: 10:04  Time OUT: 10:18      Roque Kidd PA-C, MPAS  Physician Assistant  [facility].

## 2017-07-27 DIAGNOSIS — Z86.718 PERSONAL HISTORY OF DVT (DEEP VEIN THROMBOSIS): ICD-10-CM

## 2017-07-27 DIAGNOSIS — D68.61 ANTIPHOSPHOLIPID ANTIBODY SYNDROME (H): ICD-10-CM

## 2017-07-27 RX ORDER — RIVAROXABAN 20 MG/1
TABLET, FILM COATED ORAL
Qty: 30 TABLET | Refills: 11 | Status: SHIPPED | OUTPATIENT
Start: 2017-07-27 | End: 2018-07-26

## 2017-11-20 ENCOUNTER — MYC REFILL (OUTPATIENT)
Dept: FAMILY MEDICINE | Facility: CLINIC | Age: 27
End: 2017-11-20

## 2017-11-20 DIAGNOSIS — F90.0 ATTENTION DEFICIT HYPERACTIVITY DISORDER (ADHD), PREDOMINANTLY INATTENTIVE TYPE: ICD-10-CM

## 2017-11-20 RX ORDER — DEXTROAMPHETAMINE SACCHARATE, AMPHETAMINE ASPARTATE, DEXTROAMPHETAMINE SULFATE AND AMPHETAMINE SULFATE 2.5; 2.5; 2.5; 2.5 MG/1; MG/1; MG/1; MG/1
10 TABLET ORAL 2 TIMES DAILY
Qty: 60 TABLET | Refills: 0 | Status: CANCELLED | OUTPATIENT
Start: 2017-11-20

## 2017-11-20 NOTE — TELEPHONE ENCOUNTER
Per problem list OV required every 4 months.  Patient last seen 4/11/17    Sent patient [employer] message asking him to schedule appointment.  Kaylie Wilde RN

## 2017-11-20 NOTE — TELEPHONE ENCOUNTER
Message from MyChart:  Original authorizing provider: MD Tone Bartlett would like a refill of the following medications:  amphetamine-dextroamphetamine (ADDERALL) 10 MG per tablet [Yara Faust MD]    Preferred pharmacy: Other - [facility]. [address], [place] [address]    Comment:  If mailing, please send to: [address] [place] [address] If sending electronic Rx, please send to [facility] in [place]: [address] [place] [address] If require a appointment please call me, [phone]. Thank you.

## 2017-12-22 DIAGNOSIS — I10 BENIGN ESSENTIAL HYPERTENSION: ICD-10-CM

## 2017-12-22 RX ORDER — LOSARTAN POTASSIUM 50 MG/1
TABLET ORAL
Qty: 90 TABLET | Refills: 0 | Status: SHIPPED | OUTPATIENT
Start: 2017-12-22 | End: 2018-02-21

## 2018-02-21 ENCOUNTER — OFFICE VISIT (OUTPATIENT)
Dept: FAMILY MEDICINE | Facility: CLINIC | Age: 28
End: 2018-02-21
Payer: COMMERCIAL

## 2018-02-21 VITALS
BODY MASS INDEX: 36.63 KG/M2 | SYSTOLIC BLOOD PRESSURE: 144 MMHG | WEIGHT: 270.1 LBS | RESPIRATION RATE: 16 BRPM | OXYGEN SATURATION: 96 % | TEMPERATURE: 98.8 F | HEART RATE: 86 BPM | DIASTOLIC BLOOD PRESSURE: 94 MMHG

## 2018-02-21 DIAGNOSIS — D68.61 ANTIPHOSPHOLIPID ANTIBODY SYNDROME (H): ICD-10-CM

## 2018-02-21 DIAGNOSIS — E66.812 CLASS 2 OBESITY DUE TO EXCESS CALORIES WITHOUT SERIOUS COMORBIDITY WITH BODY MASS INDEX (BMI) OF 37.0 TO 37.9 IN ADULT: ICD-10-CM

## 2018-02-21 DIAGNOSIS — R53.83 OTHER FATIGUE: ICD-10-CM

## 2018-02-21 DIAGNOSIS — F90.0 ATTENTION DEFICIT HYPERACTIVITY DISORDER (ADHD), PREDOMINANTLY INATTENTIVE TYPE: Primary | ICD-10-CM

## 2018-02-21 DIAGNOSIS — R74.8 ELEVATED LIVER ENZYMES: ICD-10-CM

## 2018-02-21 DIAGNOSIS — I10 BENIGN ESSENTIAL HYPERTENSION: ICD-10-CM

## 2018-02-21 DIAGNOSIS — Z79.01 CHRONIC ANTICOAGULATION: ICD-10-CM

## 2018-02-21 DIAGNOSIS — E66.09 CLASS 2 OBESITY DUE TO EXCESS CALORIES WITHOUT SERIOUS COMORBIDITY WITH BODY MASS INDEX (BMI) OF 37.0 TO 37.9 IN ADULT: ICD-10-CM

## 2018-02-21 LAB
ERYTHROCYTE [DISTWIDTH] IN BLOOD BY AUTOMATED COUNT: 12.7 % (ref 10–15)
HCT VFR BLD AUTO: 44.5 % (ref 40–53)
HGB BLD-MCNC: 16.1 G/DL (ref 13.3–17.7)
MCH RBC QN AUTO: 30.4 PG (ref 26.5–33)
MCHC RBC AUTO-ENTMCNC: 36.2 G/DL (ref 31.5–36.5)
MCV RBC AUTO: 84 FL (ref 78–100)
PLATELET # BLD AUTO: 213 10E9/L (ref 150–450)
RBC # BLD AUTO: 5.29 10E12/L (ref 4.4–5.9)
WBC # BLD AUTO: 5 10E9/L (ref 4–11)

## 2018-02-21 PROCEDURE — 85027 COMPLETE CBC AUTOMATED: CPT | Performed by: PHYSICIAN ASSISTANT

## 2018-02-21 PROCEDURE — 36415 COLL VENOUS BLD VENIPUNCTURE: CPT | Performed by: PHYSICIAN ASSISTANT

## 2018-02-21 PROCEDURE — 80053 COMPREHEN METABOLIC PANEL: CPT | Performed by: PHYSICIAN ASSISTANT

## 2018-02-21 PROCEDURE — 99214 OFFICE O/P EST MOD 30 MIN: CPT | Performed by: FAMILY MEDICINE

## 2018-02-21 PROCEDURE — 84443 ASSAY THYROID STIM HORMONE: CPT | Performed by: PHYSICIAN ASSISTANT

## 2018-02-21 PROCEDURE — 82977 ASSAY OF GGT: CPT | Performed by: PHYSICIAN ASSISTANT

## 2018-02-21 RX ORDER — LOSARTAN POTASSIUM 50 MG/1
100 TABLET ORAL DAILY
Qty: 60 TABLET | Refills: 1 | Status: SHIPPED | OUTPATIENT
Start: 2018-02-21 | End: 2018-05-09

## 2018-02-21 RX ORDER — DEXTROAMPHETAMINE SACCHARATE, AMPHETAMINE ASPARTATE, DEXTROAMPHETAMINE SULFATE AND AMPHETAMINE SULFATE 2.5; 2.5; 2.5; 2.5 MG/1; MG/1; MG/1; MG/1
10 TABLET ORAL 2 TIMES DAILY
Qty: 60 TABLET | Refills: 0 | Status: SHIPPED | OUTPATIENT
Start: 2018-04-24 | End: 2018-05-24

## 2018-02-21 RX ORDER — DEXTROAMPHETAMINE SACCHARATE, AMPHETAMINE ASPARTATE, DEXTROAMPHETAMINE SULFATE AND AMPHETAMINE SULFATE 2.5; 2.5; 2.5; 2.5 MG/1; MG/1; MG/1; MG/1
10 TABLET ORAL 2 TIMES DAILY
Qty: 60 TABLET | Refills: 0 | Status: SHIPPED | OUTPATIENT
Start: 2018-02-21 | End: 2018-03-23

## 2018-02-21 RX ORDER — DEXTROAMPHETAMINE SACCHARATE, AMPHETAMINE ASPARTATE, DEXTROAMPHETAMINE SULFATE AND AMPHETAMINE SULFATE 2.5; 2.5; 2.5; 2.5 MG/1; MG/1; MG/1; MG/1
10 TABLET ORAL 2 TIMES DAILY
Qty: 60 TABLET | Refills: 0 | Status: SHIPPED | OUTPATIENT
Start: 2018-03-24 | End: 2018-04-23

## 2018-02-21 NOTE — PROGRESS NOTES
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  The change of job has been extremely good for his mental health less stress.  He reports it does seem to correlate with weight gain.  He and his fiancée are getting  in October that seem to have added some stress and also during courtship often eating out though have plans to start eating healthier with his fiancée  Adderall probably this week, tries to take it 2 or 3 times a week, however the work is going to be a little bit more stress project and has plans to take it 5 days a week.  Last prescription was months ago and he has not needed another because he is able to stretch it out.    He reports he has noted some fatigue on and off, could be because of the new job of sitting longer hours, has somewhat decreased libido, is interested in checking his testosterone.  History of hypertension, no problem taking his routine Cozaar 50 mg a day HCTZ 25 mg once a day  He reports he has blood pressure apparatus at home should be able to check the blood pressure time to time but it has been hovering higher than normal.  He has not taken     Medication Followup of Ziggy    Taking Medication as prescribed: yes    Side Effects:  Taking medication less then as prescribed. Taking on a as needed basis    Medication Helping Symptoms:  yes   Hematologist- annually    PROBLEMS TO ADD ON...    Problem list and histories reviewed & adjusted, as indicated.  Additional history: as documented    Patient Active Problem List   Diagnosis     Antiphospholipid syndrome (H)     Mixed hyperlipidemia     Benign essential hypertension     Attention deficit hyperactivity disorder (ADHD), predominantly inattentive type     History of pulmonary embolism     Postphlebitic syndrome     Pulmonary  embolism (H)     Chronic anticoagulation     Past Surgical History:   Procedure Laterality Date     wisdom teeth extraction         Social History   Substance Use Topics     Smoking status: Never Smoker     Smokeless tobacco: Former User      Comment: Chew maybe once per week.     Alcohol use 0.0 oz/week     0 Standard drinks or equivalent per week      Comment: 6 drinks per week     Family History   Problem Relation Age of Onset     Asthma Mother      Family History Negative Father      Heart Surgery Father      C.A.D. Paternal Uncle 42           Reviewed and updated as needed this visit by clinical staff  Tobacco  Allergies  Meds  Med Hx  Surg Hx  Fam Hx  Soc Hx      Reviewed and updated as needed this visit by Provider         ROS:  Constitutional, HEENT, cardiovascular, pulmonary, gi and gu systems are negative, except as otherwise noted.    OBJECTIVE:     BP (!) 144/94  Pulse 86  Temp 98.8  F (37.1  C) (Tympanic)  Resp 16  Wt 270 lb 1.6 oz (122.5 kg)  SpO2 96%  BMI 36.63 kg/m2  Body mass index is 36.63 kg/(m^2).  GENERAL: healthy, alert and no distress  HENT: ear canals and TM's normal, nose and mouth without ulcers or lesions  NECK: no adenopathy, no asymmetry, masses, or scars and thyroid normal to palpation  RESP: lungs clear to auscultation - no rales, rhonchi or wheezes  CV: regular rate and rhythm, normal S1 S2, no S3 or S4, no murmur, click or rub, no peripheral edema and peripheral pulses strong  ABDOMEN: soft, nontender, no hepatosplenomegaly, no masses and bowel sounds normal  PSYCH: mentation appears normal, affect normal/bright    Diagnostic Test Results:  No results found for this or any previous visit (from the past 24 hour(s)).    ASSESSMENT/PLAN:   (F90.0) Attention deficit hyperactivity disorder (ADHD), predominantly inattentive type  (primary encounter diagnosis)  Comment: uses 2-3 /week but will be using it 5 times week  Plan:Patient is followed by [facility] for ongoing  prescription of stimulants.  All refills should be approved by this provider, or covering partner.    Medication(s): adderall IR 10 mg twice daily .   Maximum quantity per month: 60  Clinic visit frequency required: Q 3 months     Controlled substance agreement on file: Yes       Date(s): 2014  Neuropsych evaluation for ADD completed:  yes    Last [facility] website verification:  done on 2/21/18   [email or web address]  .controlledamphetamine-dextroamphetamine (ADDERALL) 10 MG         per tablet, amphetamine-dextroamphetamine         (ADDERALL) 10 MG per tablet,         amphetamine-dextroamphetamine (ADDERALL) 10 MG         per tablet  Will need annual drug screen and follow-up        (I10) Benign essential hypertension  Comment: uncontrolled:  Plan: losartan (COZAAR) 50 MG tablet increased from 1 tablet to 2 tablets once a day, continue with HCTZ 25 mg daily.  Home blood pressure monitoring and follow-up in a month.  Follow-up earlier if blood pressure continues to stay more than 140/90.    Ideally his target blood pressure should be less than 130/80            (D68.61) Antiphospholipid antibody syndrome (H)  Comment:   Plan:     (R74.8) Elevated liver enzymes  Comment:   Plan: GGT  He does report excess alcohol use daily wine but without alcohol abuse.  We will check his complete metabolic panel         (Z79.01) Chronic anticoagulation  Plan: Currently on Xarelto under care of Dr. Kidd hematologist last office visit June 2017.  He is wondering if Xarelto can be decreased from 10 it can be decreased to 10 mg instead of 20 mg    (R53.83) Other fatigue  Comment: Plan: TSH with free T4 reflex, CBC with platelets, cmp      He would have to make a.m. appointment to check testosterone, total, between 9:52 AM that would be most reflective.  He should make a nurse only appointment at that time as well or it can be done in his 1 month follow-up          obesity  We talked at length on lifestyle changes.  He is a 20 pound  weight l weight gain contributed mostly to increased calorie intake sedentary lifestyle.  He reports he is motivated to make changes.  He is advised to high-quality fresh produce [employer] and consume least amount of processed food                Yara Faust MD  [facility]

## 2018-02-21 NOTE — MR AVS SNAPSHOT
After Visit Summary   2/21/2018    Tone Marley    MRN: [id number]           Patient Information     Date Of Birth          1990        Visit Information        Provider Department      2/21/2018 4:00 PM Yara Faust MD [facility]        Today's Diagnoses     Attention deficit hyperactivity disorder (ADHD), predominantly inattentive type    -  1    Benign essential hypertension        Antiphospholipid antibody syndrome (H)        Elevated liver enzymes        Chronic anticoagulation        Other fatigue        Class 2 obesity due to excess calories without serious comorbidity with body mass index (BMI) of 37.0 to 37.9 in adult           Follow-ups after your visit        Your next 10 appointments already scheduled     Mar 08, 2018  7:45 AM CST   LAB with [facility]   [facility]    [address] [facility] [address] [phone]           Please do not eat 10-12 hours before your appointment if you are coming in fasting for labs on lipids, cholesterol, or glucose (sugar). This does not apply to pregnant women. Water, hot tea and black coffee (with nothing added) are okay. Do not drink other fluids, diet soda or chew gum.              Future tests that were ordered for you today     Open Future Orders        Priority Expected Expires Ordered    Testosterone, total Routine  2/21/2019 2/21/2018            Who to contact     If you have questions or need follow up information about today's clinic visit or your schedule please contact [facility] directly at [phone].  Normal or non-critical lab and imaging results will be communicated to you by MyChart, letter or phone within 4 business days after the clinic has received the results. If you do not hear from us within 7 days, please contact the clinic through MyChart or phone. If you have a critical or abnormal lab result, we will notify you by phone as soon as  possible.  Submit refill requests through Dispatch or call your pharmacy and they will forward the refill request to us. Please allow 3 business days for your refill to be completed.          Additional Information About Your Visit        The [employer] Information     Dispatch gives you secure access to your electronic health record. If you see a primary care provider, you can also send messages to your care team and make appointments. If you have questions, please call your primary care clinic.  If you do not have a primary care provider, please call [phone] and they will assist you.        Care EveryWhere ID     This is your Care EveryWhere ID. This could be used by other organizations to access your [place] medical records  [id number]        Your Vitals Were     Pulse Temperature Respirations Pulse Oximetry BMI (Body Mass Index)       86 98.8  F (37.1  C) (Tympanic) 16 96% 36.63 kg/m2        Blood Pressure from Last 3 Encounters:   02/21/18 (!) 144/94   06/01/17 127/80   05/02/17 (!) 149/95    Weight from Last 3 Encounters:   02/21/18 270 lb 1.6 oz (122.5 kg)   06/01/17 251 lb 12.8 oz (114.2 kg)   05/02/17 253 lb (114.8 kg)              We Performed the Following     CBC with platelets     Comprehensive metabolic panel     GGT     TSH with free T4 reflex          Today's Medication Changes          These changes are accurate as of 2/21/18  6:06 PM.  If you have any questions, ask your nurse or doctor.               These medicines have changed or have updated prescriptions.        Dose/Directions    * amphetamine-dextroamphetamine 10 MG per tablet   Commonly known as:  ADDERALL   This may have changed:  Another medication with the same name was added. Make sure you understand how and when to take each.   Used for:  Attention deficit hyperactivity disorder (ADHD), predominantly inattentive type   Changed by:  Yara Faust MD        Dose:  10 mg   Take 1 tablet (10 mg) by mouth 2 times daily   Quantity:  60  tablet   Refills:  0       * amphetamine-dextroamphetamine 10 MG per tablet   Commonly known as:  ADDERALL   This may have changed:  You were already taking a medication with the same name, and this prescription was added. Make sure you understand how and when to take each.   Used for:  Attention deficit hyperactivity disorder (ADHD), predominantly inattentive type   Changed by:  Yara Faust MD        Dose:  10 mg   Take 1 tablet (10 mg) by mouth 2 times daily   Quantity:  60 tablet   Refills:  0       * amphetamine-dextroamphetamine 10 MG per tablet   Commonly known as:  ADDERALL   This may have changed:  You were already taking a medication with the same name, and this prescription was added. Make sure you understand how and when to take each.   Used for:  Attention deficit hyperactivity disorder (ADHD), predominantly inattentive type   Changed by:  Yara Faust MD        Dose:  10 mg   Start taking on:  3/24/2018   Take 1 tablet (10 mg) by mouth 2 times daily   Quantity:  60 tablet   Refills:  0       * amphetamine-dextroamphetamine 10 MG per tablet   Commonly known as:  ADDERALL   This may have changed:  You were already taking a medication with the same name, and this prescription was added. Make sure you understand how and when to take each.   Used for:  Attention deficit hyperactivity disorder (ADHD), predominantly inattentive type   Changed by:  Yara Faust MD        Dose:  10 mg   Start taking on:  4/24/2018   Take 1 tablet (10 mg) by mouth 2 times daily   Quantity:  60 tablet   Refills:  0       losartan 50 MG tablet   Commonly known as:  COZAAR   This may have changed:  See the new instructions.   Used for:  Benign essential hypertension   Changed by:  Yara Faust MD        Dose:  100 mg   Take 2 tablets (100 mg) by mouth daily   Quantity:  60 tablet   Refills:  1       * Notice:  This list has 4 medication(s) that are the same as other medications prescribed for you.  Read the directions carefully, and ask your doctor or other care provider to review them with you.         Where to get your medicines      These medications were sent to [facility]/pharmacy #[id number] - [place], [place] - [address]  [address] [place] [address]     Phone:  [phone]     losartan 50 MG tablet         Some of these will need a paper prescription and others can be bought over the counter.  Ask your nurse if you have questions.     Bring a paper prescription for each of these medications     amphetamine-dextroamphetamine 10 MG per tablet    amphetamine-dextroamphetamine 10 MG per tablet    amphetamine-dextroamphetamine 10 MG per tablet                Primary Care Provider Office Phone # Fax #    Yara Faust [phone] [address] [facility] [address]        Equal Access to Services     JOE AGUILAR : Hadii brina dewitto Sogerman, waaxda luqadaha, qaybta kaalmada sarahy, pritesh rao . So [facility] [phone].    ATENCIÓN: Si habla español, tiene a corona disposición servicios gratuitos de asistencia lingüística. LlUniversity [facility]als [facility] [phone].    We comply with applicable federal civil rights laws and [place] laws. We do not discriminate on the basis of race, color, national origin, age, disability, sex, sexual orientation, or gender identity.            Thank you!     Thank you for choosing [facility]  for your care. Our goal is always to provide you with excellent care. Hearing back from our patients is one way we can continue to improve our services. Please take a few minutes to complete the written survey that you may receive in the mail after your visit with us. Thank you!             Your Updated Medication List - Protect others around you: Learn how to safely use, store and throw away your medicines at www.disposemymeds.org.          This list is accurate as of 2/21/18  6:06 PM.  Always use your most recent med list.                    Brand Name Dispense Instructions for use Diagnosis    * amphetamine-dextroamphetamine 10 MG per tablet    ADDERALL    60 tablet    Take 1 tablet (10 mg) by mouth 2 times daily    Attention deficit hyperactivity disorder (ADHD), predominantly inattentive type       * amphetamine-dextroamphetamine 10 MG per tablet    ADDERALL    60 tablet    Take 1 tablet (10 mg) by mouth 2 times daily    Attention deficit hyperactivity disorder (ADHD), predominantly inattentive type       * amphetamine-dextroamphetamine 10 MG per tablet   Start taking on:  3/24/2018    ADDERALL    60 tablet    Take 1 tablet (10 mg) by mouth 2 times daily    Attention deficit hyperactivity disorder (ADHD), predominantly inattentive type       * amphetamine-dextroamphetamine 10 MG per tablet   Start taking on:  4/24/2018    ADDERALL    60 tablet    Take 1 tablet (10 mg) by mouth 2 times daily    Attention deficit hyperactivity disorder (ADHD), predominantly inattentive type       COMPRESSION STOCKINGS     2 each    Please wear compression stockings in both lower extremities most time during the day and take them off at night.    Personal history of DVT (deep vein thrombosis), Antiphospholipid antibody syndrome (H)       hydrochlorothiazide 25 MG tablet    HYDRODIURIL    90 tablet    Take 1 tablet (25 mg) by mouth daily    Benign essential hypertension       losartan 50 MG tablet    COZAAR    60 tablet    Take 2 tablets (100 mg) by mouth daily    Benign essential hypertension       XARELTO 20 MG Tabs tablet   Generic drug:  rivaroxaban ANTICOAGULANT     30 tablet    TAKE 1 TABLET (20 MG) BY MOUTH DAILY (WITH DINNER)    Personal history of DVT (deep vein thrombosis), Antiphospholipid antibody syndrome (H)       * Notice:  This list has 4 medication(s) that are the same as other medications prescribed for you. Read the directions carefully, and ask your doctor or other care provider to review them with you.

## 2018-02-22 LAB
ALBUMIN SERPL-MCNC: 4.5 G/DL (ref 3.4–5)
ALP SERPL-CCNC: 112 U/L (ref 40–150)
ALT SERPL W P-5'-P-CCNC: 232 U/L (ref 0–70)
ANION GAP SERPL CALCULATED.3IONS-SCNC: 9 MMOL/L (ref 3–14)
AST SERPL W P-5'-P-CCNC: 106 U/L (ref 0–45)
BILIRUB SERPL-MCNC: 0.6 MG/DL (ref 0.2–1.3)
BUN SERPL-MCNC: 21 MG/DL (ref 7–30)
CALCIUM SERPL-MCNC: 9.6 MG/DL (ref 8.5–10.1)
CHLORIDE SERPL-SCNC: 101 MMOL/L (ref 94–109)
CO2 SERPL-SCNC: 26 MMOL/L (ref 20–32)
CREAT SERPL-MCNC: 1.07 MG/DL (ref 0.66–1.25)
GFR SERPL CREATININE-BSD FRML MDRD: 81 ML/MIN/1.7M2
GGT SERPL-CCNC: 90 U/L (ref 0–75)
GLUCOSE SERPL-MCNC: 85 MG/DL (ref 70–99)
POTASSIUM SERPL-SCNC: 4.1 MMOL/L (ref 3.4–5.3)
PROT SERPL-MCNC: 8.7 G/DL (ref 6.8–8.8)
SODIUM SERPL-SCNC: 136 MMOL/L (ref 133–144)
TSH SERPL DL<=0.005 MIU/L-ACNC: 3.41 MU/L (ref 0.4–4)

## 2018-03-08 DIAGNOSIS — R53.83 OTHER FATIGUE: ICD-10-CM

## 2018-03-08 PROCEDURE — 84403 ASSAY OF TOTAL TESTOSTERONE: CPT | Performed by: FAMILY MEDICINE

## 2018-03-08 PROCEDURE — 36415 COLL VENOUS BLD VENIPUNCTURE: CPT | Performed by: FAMILY MEDICINE

## 2018-03-09 LAB — TESTOST SERPL-MCNC: 124 NG/DL (ref 240–950)

## 2018-03-21 NOTE — PROGRESS NOTES
mychart message  You should be seen by the endocrinologist  Often time, a repeat test is advisable, and that too in am- up to 8 or 9 am, blood sample. I have future the lab- so that you can make a lab only appointment and also proceed with calling endocrinologist to discuss further treatment

## 2018-05-09 DIAGNOSIS — I10 BENIGN ESSENTIAL HYPERTENSION: ICD-10-CM

## 2018-05-09 RX ORDER — LOSARTAN POTASSIUM 50 MG/1
TABLET ORAL
Qty: 90 TABLET | Refills: 0 | Status: SHIPPED | OUTPATIENT
Start: 2018-05-09 | End: 2018-08-07 | Stop reason: DRUGHIGH

## 2018-05-09 RX ORDER — HYDROCHLOROTHIAZIDE 25 MG/1
TABLET ORAL
Qty: 90 TABLET | Refills: 0 | Status: SHIPPED | OUTPATIENT
Start: 2018-05-09 | End: 2018-07-22

## 2018-05-09 NOTE — TELEPHONE ENCOUNTER
"Requested Prescriptions   Pending Prescriptions Disp Refills     losartan (COZAAR) 50 MG tablet [Pharmacy Med Name: LOSARTAN POTASSIUM 50 MG TAB] 90 tablet 0     Sig: TAKE 1 TABLET (50 MG) BY MOUTH DAILY    Angiotensin-II Receptors Failed    5/9/2018  1:37 AM       Failed - Blood pressure under 140/90 in past 12 months    BP Readings from Last 3 Encounters:   02/21/18 (!) 144/94   06/01/17 127/80   05/02/17 (!) 149/95                Passed - Recent (12 mo) or future (30 days) visit within the authorizing provider's specialty    Patient had office visit in the last 12 months or has a visit in the next 30 days with authorizing provider or within the authorizing provider's specialty.  See \"Patient Info\" tab in inbasket, or \"Choose Columns\" in Meds & Orders section of the refill encounter.           Passed - Patient is age 18 or older       Passed - Normal serum creatinine on file in past 12 months    Recent Labs   Lab Test  02/21/18   1636   CR  1.07            Passed - Normal serum potassium on file in past 12 months    Recent Labs   Lab Test  02/21/18   1636   POTASSIUM  4.1                    hydrochlorothiazide (HYDRODIURIL) 25 MG tablet [Pharmacy Med Name: HYDROCHLOROTHIAZIDE 25 MG TAB] 90 tablet 0     Sig: TAKE 1 TABLET (25 MG) BY MOUTH DAILY    Diuretics (Including Combos) Protocol Failed    5/9/2018  1:37 AM       Failed - Blood pressure under 140/90 in past 12 months    BP Readings from Last 3 Encounters:   02/21/18 (!) 144/94   06/01/17 127/80   05/02/17 (!) 149/95                Passed - Recent (12 mo) or future (30 days) visit within the authorizing provider's specialty    Patient had office visit in the last 12 months or has a visit in the next 30 days with authorizing provider or within the authorizing provider's specialty.  See \"Patient Info\" tab in inbasket, or \"Choose Columns\" in Meds & Orders section of the refill encounter.           Passed - Patient is age 18 or older       Passed - Normal serum " creatinine on file in past 12 months    Recent Labs   Lab Test  02/21/18   1636   CR  1.07             Passed - Normal serum potassium on file in past 12 months    Recent Labs   Lab Test  02/21/18   1636   POTASSIUM  4.1                   Passed - Normal serum sodium on file in past 12 months    Recent Labs   Lab Test  02/21/18   1636   NA  136

## 2018-07-02 ENCOUNTER — TRANSFERRED RECORDS (OUTPATIENT)
Dept: HEALTH INFORMATION MANAGEMENT | Facility: CLINIC | Age: 28
End: 2018-07-02

## 2018-07-18 DIAGNOSIS — I10 BENIGN ESSENTIAL HYPERTENSION: ICD-10-CM

## 2018-07-19 RX ORDER — HYDROCHLOROTHIAZIDE 25 MG/1
TABLET ORAL
Start: 2018-07-19

## 2018-07-19 NOTE — TELEPHONE ENCOUNTER
"Too soon.  Rx sent 5/9/18 for #90.  Kaylie Wilde, CAMI    Requested Prescriptions   Refused Prescriptions Disp Refills     hydrochlorothiazide (HYDRODIURIL) 25 MG tablet [Pharmacy Med Name: HYDROCHLOROTHIAZIDE 25 MG TAB]       Sig: TAKE 1 TABLET BY MOUTH EVERY DAY    Diuretics (Including Combos) Protocol Failed    7/18/2018 12:32 PM       Failed - Blood pressure under 140/90 in past 12 months    BP Readings from Last 3 Encounters:   02/21/18 (!) 144/94   06/01/17 127/80   05/02/17 (!) 149/95                Passed - Recent (12 mo) or future (30 days) visit within the authorizing provider's specialty    Patient had office visit in the last 12 months or has a visit in the next 30 days with authorizing provider or within the authorizing provider's specialty.  See \"Patient Info\" tab in inbasket, or \"Choose Columns\" in Meds & Orders section of the refill encounter.           Passed - Patient is age 18 or older       Passed - Normal serum creatinine on file in past 12 months    Recent Labs   Lab Test  02/21/18   1636   CR  1.07             Passed - Normal serum potassium on file in past 12 months    Recent Labs   Lab Test  02/21/18   1636   POTASSIUM  4.1                   Passed - Normal serum sodium on file in past 12 months    Recent Labs   Lab Test  02/21/18   1636   NA  136                "

## 2018-07-26 ENCOUNTER — TELEPHONE (OUTPATIENT)
Dept: FAMILY MEDICINE | Facility: CLINIC | Age: 28
End: 2018-07-26

## 2018-07-26 DIAGNOSIS — D68.61 ANTIPHOSPHOLIPID ANTIBODY SYNDROME (H): ICD-10-CM

## 2018-07-26 DIAGNOSIS — Z86.718 PERSONAL HISTORY OF DVT (DEEP VEIN THROMBOSIS): ICD-10-CM

## 2018-07-26 DIAGNOSIS — I10 BENIGN ESSENTIAL HYPERTENSION: ICD-10-CM

## 2018-07-26 RX ORDER — LOSARTAN POTASSIUM 50 MG/1
TABLET ORAL
Qty: 60 TABLET | Refills: 0 | Status: SHIPPED | OUTPATIENT
Start: 2018-07-26 | End: 2018-08-08

## 2018-07-26 NOTE — TELEPHONE ENCOUNTER
Reason for Call:  Medication or medication refill:    Do you use a [facility]?  Name of the pharmacy and phone number for the current request:    [facility]/PHARMACY #[id number] - [place], [place] - [address]    PHARMACY CALLING SAYING THAT THEY HAVE NOT RECEIVED REFILL REQUEST      Name of the medication requested: losartan (COZAAR) 50 MG tablet        Can we leave a detailed message on this number? YES    Phone number patient can be reached at: Other phone number: [phone]    Best Time: ANYTIME    Call taken on 7/26/2018 at 10:08 AM by Angela Chu

## 2018-08-07 ENCOUNTER — OFFICE VISIT (OUTPATIENT)
Dept: HEMATOLOGY | Facility: CLINIC | Age: 28
End: 2018-08-07
Attending: PHYSICIAN ASSISTANT
Payer: COMMERCIAL

## 2018-08-07 VITALS
HEART RATE: 70 BPM | SYSTOLIC BLOOD PRESSURE: 136 MMHG | OXYGEN SATURATION: 100 % | DIASTOLIC BLOOD PRESSURE: 87 MMHG | HEIGHT: 71 IN | TEMPERATURE: 98.2 F | WEIGHT: 248.2 LBS | BODY MASS INDEX: 34.75 KG/M2

## 2018-08-07 DIAGNOSIS — D68.61 ANTIPHOSPHOLIPID ANTIBODY SYNDROME (H): ICD-10-CM

## 2018-08-07 DIAGNOSIS — Z79.01 CHRONIC ANTICOAGULATION: Primary | ICD-10-CM

## 2018-08-07 DIAGNOSIS — R79.89 LOW TESTOSTERONE IN MALE: ICD-10-CM

## 2018-08-07 DIAGNOSIS — Z86.718 HISTORY OF DEEP VENOUS THROMBOSIS: ICD-10-CM

## 2018-08-07 DIAGNOSIS — Z86.73 HISTORY OF CVA (CEREBROVASCULAR ACCIDENT): ICD-10-CM

## 2018-08-07 LAB
ALBUMIN SERPL-MCNC: 4.4 G/DL (ref 3.4–5)
ALP SERPL-CCNC: 98 U/L (ref 40–150)
ALT SERPL W P-5'-P-CCNC: 92 U/L (ref 0–70)
ANION GAP SERPL CALCULATED.3IONS-SCNC: 7 MMOL/L (ref 3–14)
AST SERPL W P-5'-P-CCNC: 37 U/L (ref 0–45)
BASOPHILS # BLD AUTO: 0 10E9/L (ref 0–0.2)
BASOPHILS NFR BLD AUTO: 0.4 %
BILIRUB SERPL-MCNC: 0.7 MG/DL (ref 0.2–1.3)
BUN SERPL-MCNC: 16 MG/DL (ref 7–30)
CALCIUM SERPL-MCNC: 9.8 MG/DL (ref 8.5–10.1)
CHLORIDE SERPL-SCNC: 102 MMOL/L (ref 94–109)
CO2 SERPL-SCNC: 28 MMOL/L (ref 20–32)
CREAT SERPL-MCNC: 0.98 MG/DL (ref 0.66–1.25)
DIFFERENTIAL METHOD BLD: NORMAL
EOSINOPHIL # BLD AUTO: 0.1 10E9/L (ref 0–0.7)
EOSINOPHIL NFR BLD AUTO: 2.4 %
ERYTHROCYTE [DISTWIDTH] IN BLOOD BY AUTOMATED COUNT: 12.5 % (ref 10–15)
GFR SERPL CREATININE-BSD FRML MDRD: 90 ML/MIN/1.7M2
GLUCOSE SERPL-MCNC: 92 MG/DL (ref 70–99)
HCT VFR BLD AUTO: 41 % (ref 40–53)
HGB BLD-MCNC: 14.5 G/DL (ref 13.3–17.7)
IMM GRANULOCYTES # BLD: 0 10E9/L (ref 0–0.4)
IMM GRANULOCYTES NFR BLD: 0 %
LYMPHOCYTES # BLD AUTO: 1.7 10E9/L (ref 0.8–5.3)
LYMPHOCYTES NFR BLD AUTO: 37.8 %
MCH RBC QN AUTO: 29.5 PG (ref 26.5–33)
MCHC RBC AUTO-ENTMCNC: 35.4 G/DL (ref 31.5–36.5)
MCV RBC AUTO: 84 FL (ref 78–100)
MONOCYTES # BLD AUTO: 0.5 10E9/L (ref 0–1.3)
MONOCYTES NFR BLD AUTO: 10.2 %
NEUTROPHILS # BLD AUTO: 2.2 10E9/L (ref 1.6–8.3)
NEUTROPHILS NFR BLD AUTO: 49.2 %
NRBC # BLD AUTO: 0 10*3/UL
NRBC BLD AUTO-RTO: 0 /100
PLATELET # BLD AUTO: 195 10E9/L (ref 150–450)
POTASSIUM SERPL-SCNC: 3.8 MMOL/L (ref 3.4–5.3)
PROT SERPL-MCNC: 8.6 G/DL (ref 6.8–8.8)
RBC # BLD AUTO: 4.91 10E12/L (ref 4.4–5.9)
SODIUM SERPL-SCNC: 137 MMOL/L (ref 133–144)
WBC # BLD AUTO: 4.5 10E9/L (ref 4–11)

## 2018-08-07 PROCEDURE — 85025 COMPLETE CBC W/AUTO DIFF WBC: CPT | Performed by: FAMILY MEDICINE

## 2018-08-07 PROCEDURE — 84403 ASSAY OF TOTAL TESTOSTERONE: CPT | Performed by: FAMILY MEDICINE

## 2018-08-07 PROCEDURE — 80053 COMPREHEN METABOLIC PANEL: CPT | Performed by: FAMILY MEDICINE

## 2018-08-07 PROCEDURE — G0463 HOSPITAL OUTPT CLINIC VISIT: HCPCS

## 2018-08-07 ASSESSMENT — PAIN SCALES - GENERAL: PAINLEVEL: NO PAIN (0)

## 2018-08-07 NOTE — MR AVS SNAPSHOT
After Visit Summary   8/7/2018    Tone Marley    MRN: [id number]           Patient Information     Date Of Birth          1990        Visit Information        Provider Department      8/7/2018 10:00 AM Roque Kidd PA-C [facility]        Today's Diagnoses     Chronic anticoagulation    -  1    History of deep venous thrombosis        Antiphospholipid antibody syndrome (H)        History of CVA (cerebrovascular accident)          Care Instructions    1  Labs today.  Call Alejandra for results [phone].  2.  See PCP Dr. Faust to discuss Lipitor, testosterone, BP.  3.  See Neurology Phone: [phone]  4.  See BRYAN Burgos back in 6 months          Follow-ups after your visit        Follow-up notes from your care team     Return in about 6 months (around 2/7/2019).      Future tests that were ordered for you today     Open Future Orders        Priority Expected Expires Ordered    CBC with platelets differential Routine  8/7/2019 8/7/2018    Comprehensive metabolic panel Routine  8/7/2019 8/7/2018            Who to contact     If you have questions or need follow up information about today's clinic visit or your schedule please contact [facility] directly at [phone].  Normal or non-critical lab and imaging results will be communicated to you by MyChart, letter or phone within 4 business days after the clinic has received the results. If you do not hear from us within 7 days, please contact the clinic through MyChart or phone. If you have a critical or abnormal lab result, we will notify you by phone as soon as possible.  Submit refill requests through OpenRoute or call your pharmacy and they will forward the refill request to us. Please allow 3 business days for your refill to be completed.          Additional Information About Your Visit        [employer] Information     OpenRoute gives you secure access to your electronic  "health record. If you see a primary care provider, you can also send messages to your care team and make appointments. If you have questions, please call your primary care clinic.  If you do not have a primary care provider, please call [phone] and they will assist you.        Care EveryWhere ID     This is your Care EveryWhere ID. This could be used by other organizations to access your [place] medical records  [id number]        Your Vitals Were     Pulse Temperature Height Pulse Oximetry BMI (Body Mass Index)       70 98.2  F (36.8  C) (Oral) 1.803 m (5' 11\") 100% 34.62 kg/m2        Blood Pressure from Last 3 Encounters:   08/07/18 136/87   02/21/18 (!) 144/94   06/01/17 127/80    Weight from Last 3 Encounters:   08/07/18 112.6 kg (248 lb 3.2 oz)   02/21/18 122.5 kg (270 lb 1.6 oz)   06/01/17 114.2 kg (251 lb 12.8 oz)                 Today's Medication Changes          These changes are accurate as of 8/7/18 11:01 AM.  If you have any questions, ask your nurse or doctor.               These medicines have changed or have updated prescriptions.        Dose/Directions    losartan 50 MG tablet   Commonly known as:  COZAAR   This may have changed:  Another medication with the same name was removed. Continue taking this medication, and follow the directions you see here.   Used for:  Benign essential hypertension        TAKE 2 TABLETS (100 MG) BY MOUTH DAILY   Quantity:  60 tablet   Refills:  0                Primary Care Provider Office Phone # Fax #    Yara Elvis Faust [phone] [address] [facility] [address]        Equal Access to Services     [facility] : roland Morelos, pritesh daley. So [facility] [phone].    ATENCIÓN: Si habla español, tiene a corona disposición servicios gratuitos de asistencia lingüística. Llame al [phone].    We comply with applicable federal civil rights " laws and [place] laws. We do not discriminate on the basis of race, color, national origin, age, disability, sex, sexual orientation, or gender identity.            Thank you!     Thank you for choosing [facility]  for your care. Our goal is always to provide you with excellent care. Hearing back from our patients is one way we can continue to improve our services. Please take a few minutes to complete the written survey that you may receive in the mail after your visit with us. Thank you!             Your Updated Medication List - Protect others around you: Learn how to safely use, store and throw away your medicines at www.disposemymeds.org.          This list is accurate as of 8/7/18 11:01 AM.  Always use your most recent med list.                   Brand Name Dispense Instructions for use Diagnosis    amphetamine-dextroamphetamine 10 MG per tablet    ADDERALL    60 tablet    Take 1 tablet (10 mg) by mouth 2 times daily    Attention deficit hyperactivity disorder (ADHD), predominantly inattentive type       ASPIRIN PO      Take 81 mg by mouth daily        COMPRESSION STOCKINGS     2 each    Please wear compression stockings in both lower extremities most time during the day and take them off at night.    Personal history of DVT (deep vein thrombosis), Antiphospholipid antibody syndrome (H)       hydrochlorothiazide 25 MG tablet    HYDRODIURIL    90 tablet    TAKE 1 TABLET BY MOUTH EVERY DAY    Benign essential hypertension       losartan 50 MG tablet    COZAAR    60 tablet    TAKE 2 TABLETS (100 MG) BY MOUTH DAILY    Benign essential hypertension       rivaroxaban ANTICOAGULANT 20 MG Tabs tablet    XARELTO    30 tablet    Take 1 tablet (20 mg) by mouth daily (with dinner)    Personal history of DVT (deep vein thrombosis), Antiphospholipid antibody syndrome (H)

## 2018-08-07 NOTE — PROGRESS NOTES
[facility]  [address] [place] [id number]  Main: [phone], Fax: [phone]    Patient seen at: [facility] at [address] [place] [address].    Outpatient Visit Note:    Patient: Tone Marley  MRN: [id number]  : 1990  CAROLINE: 2018    Reason:  History of recurrent DVT and PEs.  Antiphospholipid antibody syndrome.  Currently on chronic anticoagulation therapy with Xarelto.  Here for his routine annual follow-up visit.  Also discussed about recent hospitalization for a vertebral artery dissection with stroke.    Clinical History Summary:  Tone is a 28-year-old male who suffered from massive lower extremity DVT involving the right iliac vein and large left sided pulmonary embolism back when he was 13 years of age and subsequently was found to have antiphospholipid antibody syndrome (APLS), who is currently on indefinite anticoagulation therapy with Xarelto, return to clinic today for his routine annual follow-up clinic visit as well has follow-up after his recent hospitalization for vertebral artery dissection with stroke.  He was last seen by me back in 2017, please see my previous notes for his detailed clinical history.    In summary, back in , he suffered a deep vein thrombosis in the right external iliac vein with left lower lobe pulmonary embolism.  In , he again suffered another deep vein thrombosis in his left lower extremity and was found to have antiphospholipid antibody syndrome with his lupus anticoagulant being persistently positive.  He was then placed on Coumadin at that time.  During our last visit with him back in May 2009, he was not quite compliant with his Coumadin monitoring.  Eventually he was followed by the [facility] Coumadin monitoring clinic and he was doing better.    Because of his history of antiphospholipid antibody syndrome, historically his Coumadin dosing was adjusted  based on factor II levels with a goal range of 15-25%.    Back a few years ago, around 2011, he started to attend pharmacy school in [facility]..  At that time, we had him establish care with a hematologist in [facility].    He has maintained on Coumadin from 2000 11/2/2013.  Then according to the patient he only had his INR monitored and was not utilizing factor II or chromogenic factor X levels for monitoring.  Then in September 2013, he apparently developed significant chest pain on the left side.  He eventually was admitted and was evaluated by Dr. Antonio Turcios (Hematologist at [facility] in [place]).  At that time, the patient was on Coumadin with his INR levels within the 2-3 range.  However according to Dr. Turcios, his factor II level was subtherapeutic in the 50s%.  CT scan was done which revealed a large left lower lobe clot burden.  Because of concern of Coumadin failure the patient was subsequently placed on enoxaparin for a few months.  During the follow-up visit with Dr. Turcios in July 2014, Dr. Porter recommended that Tone should consider switching his anticoagulation therapy to Xarelto at 20 mg p.o. daily.  Tone has maintained on this regimen since.    Tone also has since moved back to [place] about 3 years ago and reestablish care with this clinic.    Interim History:  Most recently, Tone presented to [facility] ([facility] for evaluation of headaches and visual changes. For about one month prior to his presentation, he has had progressively worsening headache. Then one week prior to his admission, he developed central visual abnormalities. He eventually underwent MRI of the brain the day of his 7/2/2018 admission and was found to have concern for PRES strength syndrome vs cavernous sinus. MRA evaluation subsequently showed dissection in the right vertebral artery with 50-60% stenosis. His BP was found to be 170/110 on admission as well. He was placed  "on Aspirin was well as Lipitor with recommendation of better BP control. Throughout his hospitalization, Xarelto was continued.     Subjective:  Currently Tone continues to complaint of some visual disturbance for which he is followed by a physician at a eye clinic specialized in post stroke eye care. Denies any bleeding complications despite started on Aspirin since his discharge from [facility] recently. No gum bleeding. Denies any hematuria or blood in stools. No shortness of breath. No chest pain. Denies any epistaxis.      Medications, Allergies, PmHx:  All reviewed by myself in the electronic medical records.    Social History:  Most recently he had broken up with his fiance and moved out of the house just yesterday. He has been unemployed for the past couple of months. 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 He has stopped consuming alcohol and tobacco use since his recent hospital admission.     Family History:  Deferred.    Objective:  Pleasant in no acute distress.  Vitals: /87 (BP Location: Right arm)  Pulse 70  Temp 98.2  F (36.8  C) (Oral)  Ht 1.803 m (5' 11\")  Wt 112.6 kg (248 lb 3.2 oz)  SpO2 100%  BMI 34.62 kg/m2  Exam:  Lungs: Clear to auscultation bilaterally.  Card: S1 S2. No murmurs rubs or gallops. RRR.  Abd: Non tender. Non distended. Soft. Positive bowel sound.   Ext: There is no swelling of both lower extremities noted.     Labs:  Component      Latest Ref Rng & Units 6/1/2017 2/21/2018   Sodium      133 - 144 mmol/L  136   Potassium      3.4 - 5.3 mmol/L  4.1   Chloride      94 - 109 mmol/L  101   Carbon Dioxide      20 - 32 mmol/L  26   Anion Gap      3 - 14 mmol/L  9   Glucose      70 - 99 mg/dL  85   Urea Nitrogen      7 - 30 mg/dL  21   Creatinine      0.66 - 1.25 mg/dL  1.07   GFR Estimate      >60 mL/min/1.7m2  81   GFR Estimate If Black      >60 mL/min/1.7m2  >90   Calcium      8.5 - 10.1 mg/dL  9.6   Bilirubin Total      0.2 - 1.3 mg/dL 0.8 0.6   Albumin      3.4 " - 5.0 g/dL 3.8 4.5   Protein Total      6.8 - 8.8 g/dL 7.9 8.7   Alkaline Phosphatase      40 - 150 U/L 104 112   ALT      0 - 70 U/L 86 (H) 232 (H)   AST      0 - 45 U/L 39 106 (H)   WBC      4.0 - 11.0 10e9/L  5.0   RBC Count      4.4 - 5.9 10e12/L  5.29   Hemoglobin      13.3 - 17.7 g/dL  16.1   Hematocrit      40.0 - 53.0 %  44.5   MCV      78 - 100 fl  84   MCH      26.5 - 33.0 pg  30.4   MCHC      31.5 - 36.5 g/dL  36.2   RDW      10.0 - 15.0 %  12.7   Platelet Count      150 - 450 10e9/L  213   Bilirubin Direct      0.0 - 0.2 mg/dL 0.2      Imagin2018 at [facility], MRA head and neck stroke:  1. Dissection in the intrathoracic and proximal cervical segment of right  vertebral artery. Approximately 50 to 60 percent stenosis.  2. Carotid arteries are normally patent. 0 percent stenosis at the internal carotid origins by NASCET criteria.  3. Left vertebral artery origin at the arch is a normal developmental variation.    2018 at [facility], MRI brain:  1. Findings are compatible with multifocal subacute infarctions of differing ages in distal branch distribution of both posterior cerebral arteries. The dominant lesions are situated at the posterior inferior left temporal convexity, posterior left occipital pole and posterior paramedian right parietal convexity. Slight degraded blood products are present in the right posterior paramedian parietal cortex. The pattern of signal abnormality is atypical for PRES.     Assessment:  In summary, Tone is a 28 year old male with a history of recurrent lower extremity DVT and pulmonary embolism who also has Antiphospholipid Antibody Syndrome, currently on chronic anticoagulation therapy with Xarelto at 20 mg PO Qday, returns to clinic today for his routine annual follow up clinic visit and to follow up with this clinic after his recent hospitalization of stroke.     It is unlikely that his recent stroke was related to his APLS as he does have other risk factors that placed  him at risk for an vertebral artery dissection and stenosis including his history of hyperlipidemia and hypertension. He also indicated to me that he had stopped taking his hydrochlorothiazide just a few weeks prior to his recent admission to [facility].      Diagnosis:  1. History of recurrent DVT/PE.  2. Antiphospholipid Antibody Syndrome (APLS).  3. Chronic anticoagulation therapy with Xarelto.  4. Hypertension.   5. Recent ischemic stroke on 7/2/2018.     Plan:  Agree with risk stratification for secondary ischemic stroke prevention including management of hypertension, hyperlipidemia, tobacco cessation, avoidance of alcohol and daily Aspirin use. Currently he has clear indications for both anticoagulation therapy and antiplatelet therapy. He should maintain on both Xarelto and a daily Aspirin as long as he has no issues with bleeding complications. I will recheck his hepatic function today along with his CBC.     The patient is inquiring about referral to neurology clinic for evaluation, given his recent ischemic stroke and residual visual disturbance, I do think that this is reasonable. Will place Neurology clinic referral.    I also have asked the patient to return to his primary care provider clinic for a follow up visit to discuss optimizing his hypertension management and hyperlipidemia management.     I will plan on seeing him back in 6 months time for a follow up clinic visit.       Roque Kidd PA-C, MPAS  Physician Assistant  [facility].     Addendum 8/7/2018 at 15:45:    Component      Latest Ref Rng & Units 8/7/2018   WBC      4.0 - 11.0 10e9/L 4.5   RBC Count      4.4 - 5.9 10e12/L 4.91   Hemoglobin      13.3 - 17.7 g/dL 14.5   Hematocrit      40.0 - 53.0 % 41.0   MCV      78 - 100 fl 84   MCH      26.5 - 33.0 pg 29.5   MCHC      31.5 - 36.5 g/dL 35.4   RDW      10.0 - 15.0 % 12.5   Platelet Count      150 - 450 10e9/L 195   Diff Method        Automated Method   % Neutrophils      % 49.2   % Lymphocytes      % 37.8   % Monocytes      % 10.2   % Eosinophils      % 2.4   % Basophils      % 0.4   % Immature Granulocytes      % 0.0   Nucleated RBCs      0 /100 0   Absolute Neutrophil      1.6 - 8.3 10e9/L 2.2   Absolute Lymphocytes      0.8 - 5.3 10e9/L 1.7   Absolute Monocytes      0.0 - 1.3 10e9/L 0.5   Absolute Eosinophils      0.0 - 0.7 10e9/L 0.1   Absolute Basophils      0.0 - 0.2 10e9/L 0.0   Abs Immature Granulocytes      0 - 0.4 10e9/L 0.0   Absolute Nucleated RBC       0.0   Sodium      133 - 144 mmol/L 137   Potassium      3.4 - 5.3 mmol/L 3.8   Chloride      94 - 109 mmol/L 102   Carbon Dioxide      20 - 32 mmol/L 28   Anion Gap      3 - 14 mmol/L 7   Glucose      70 - 99 mg/dL 92   Urea Nitrogen      7 - 30 mg/dL 16   Creatinine      0.66 - 1.25 mg/dL 0.98   GFR Estimate      >60 mL/min/1.7m2 90   GFR Estimate If Black      >60 mL/min/1.7m2 >90   Calcium      8.5 - 10.1 mg/dL 9.8   Bilirubin Total      0.2 - 1.3 mg/dL 0.7   Albumin      3.4 - 5.0 g/dL 4.4   Protein Total      6.8 - 8.8 g/dL 8.6   Alkaline Phosphatase      40 - 150 U/L 98   ALT      0 - 70 U/L 92 (H)   AST      0 - 45 U/L 37         Roque Kidd PA-C, MPAS  Physician Assistant  [facility].

## 2018-08-07 NOTE — NURSING NOTE
Tone Marley  MRN: [id number]  Male, 28 year old, 1990  Hx DVT/PE/APS    Saw Tone Marley with BRYAN Burgos today.  He was hospitalized at [facility] in July for CVA with dissection of vetebral artery.  He had elevated BP.  He had been on Xarelto 20 mg, and aspirin was added to meds in July as well as BP & cholesterol meds.  See AVS for other details.  We would like to see him back in 6 months to review anticoagulation plan.  Taryn Escoto, RN, MSN -Nurse Clinician, [facility] [phone]

## 2018-08-07 NOTE — PATIENT INSTRUCTIONS
1  Labs today.  Call Alejandra for results [phone].  2.  See PCP Dr. Faust to discuss Lipitor, testosterone, BP.  3.  See Neurology Phone: [phone]  4.  See BRYAN Burgos back in 6 months

## 2018-08-08 ENCOUNTER — TELEPHONE (OUTPATIENT)
Dept: FAMILY MEDICINE | Facility: CLINIC | Age: 28
End: 2018-08-08

## 2018-08-08 DIAGNOSIS — I10 BENIGN ESSENTIAL HYPERTENSION: ICD-10-CM

## 2018-08-08 NOTE — TELEPHONE ENCOUNTER
Routing refill request to provider for review/approval because:    Pt requesting a 90 day supply.  Please authorize if appropriate.  Thanks,  Rashida Oropeza RN

## 2018-08-09 LAB — TESTOST SERPL-MCNC: 164 NG/DL (ref 240–950)

## 2018-08-09 RX ORDER — LOSARTAN POTASSIUM 50 MG/1
TABLET ORAL
Qty: 90 TABLET | Refills: 0 | Status: SHIPPED | OUTPATIENT
Start: 2018-08-09 | End: 2018-08-14

## 2018-08-09 NOTE — PROGRESS NOTES
Testosterone is at 164, better than 4 months ago  Lower normal range is 240     Please keep us posted with questions or concerns .      Best Regards,    Yara Faust MD  [facility]  [phone]

## 2018-08-09 NOTE — TELEPHONE ENCOUNTER
90 day supply sent  Should have a return office visit in next 1-3 months to make sure his Blood pressure is stable  Last OV here 02/2018. thanks

## 2018-08-14 RX ORDER — LOSARTAN POTASSIUM 50 MG/1
TABLET ORAL
Qty: 90 TABLET | Refills: 0 | Status: SHIPPED | OUTPATIENT
Start: 2018-08-14 | End: 2018-08-23

## 2018-08-14 NOTE — TELEPHONE ENCOUNTER
The pharmacy called and said that he takes 2 a day and caitlin he needs 180 tablets to have a 90 day supply   Please send another 90 tablets

## 2018-08-23 RX ORDER — LOSARTAN POTASSIUM 50 MG/1
TABLET ORAL
Qty: 180 TABLET | Refills: 0 | Status: SHIPPED | OUTPATIENT
Start: 2018-08-23 | End: 2018-09-04

## 2018-08-23 NOTE — TELEPHONE ENCOUNTER
Rx sent 8/9/2018 for #90   Another #90 sent 8/14/2018 for total of #180  Just resent for #180 instead  Lala GOMEZ RN

## 2018-08-23 NOTE — TELEPHONE ENCOUNTER
Pt called/ there is some confusion about insurance coverage/  He needs losartan resent as 180 tablets as a 90 day supply/ so it will be covered by ins       [id number] - [place], [place] - [address]    Please call if any questions or concerns

## 2018-09-04 ENCOUNTER — OFFICE VISIT (OUTPATIENT)
Dept: FAMILY MEDICINE | Facility: CLINIC | Age: 28
End: 2018-09-04
Payer: COMMERCIAL

## 2018-09-04 VITALS
HEIGHT: 71 IN | OXYGEN SATURATION: 97 % | RESPIRATION RATE: 14 BRPM | WEIGHT: 247 LBS | BODY MASS INDEX: 34.58 KG/M2 | DIASTOLIC BLOOD PRESSURE: 88 MMHG | HEART RATE: 70 BPM | SYSTOLIC BLOOD PRESSURE: 132 MMHG | TEMPERATURE: 98.5 F

## 2018-09-04 DIAGNOSIS — Z79.01 CHRONIC ANTICOAGULATION: ICD-10-CM

## 2018-09-04 DIAGNOSIS — E78.2 MIXED HYPERLIPIDEMIA: ICD-10-CM

## 2018-09-04 DIAGNOSIS — D68.61 ANTIPHOSPHOLIPID ANTIBODY SYNDROME (H): ICD-10-CM

## 2018-09-04 DIAGNOSIS — I10 BENIGN ESSENTIAL HYPERTENSION: Primary | ICD-10-CM

## 2018-09-04 DIAGNOSIS — Z23 NEED FOR INFLUENZA VACCINATION: ICD-10-CM

## 2018-09-04 DIAGNOSIS — Z86.73 HISTORY OF CVA (CEREBROVASCULAR ACCIDENT): ICD-10-CM

## 2018-09-04 PROCEDURE — 80061 LIPID PANEL: CPT | Performed by: FAMILY MEDICINE

## 2018-09-04 PROCEDURE — 80053 COMPREHEN METABOLIC PANEL: CPT | Performed by: FAMILY MEDICINE

## 2018-09-04 PROCEDURE — 82977 ASSAY OF GGT: CPT | Performed by: FAMILY MEDICINE

## 2018-09-04 PROCEDURE — 99214 OFFICE O/P EST MOD 30 MIN: CPT | Mod: 25 | Performed by: FAMILY MEDICINE

## 2018-09-04 PROCEDURE — 90686 IIV4 VACC NO PRSV 0.5 ML IM: CPT | Performed by: FAMILY MEDICINE

## 2018-09-04 PROCEDURE — 36415 COLL VENOUS BLD VENIPUNCTURE: CPT | Performed by: FAMILY MEDICINE

## 2018-09-04 PROCEDURE — 90471 IMMUNIZATION ADMIN: CPT | Performed by: FAMILY MEDICINE

## 2018-09-04 RX ORDER — ATORVASTATIN CALCIUM 10 MG/1
10 TABLET, FILM COATED ORAL DAILY
Qty: 30 TABLET | Refills: 1 | COMMUNITY
Start: 2018-09-04 | End: 2018-10-03

## 2018-09-04 RX ORDER — LOSARTAN POTASSIUM 100 MG/1
100 TABLET ORAL DAILY
Qty: 90 TABLET | Refills: 1 | Status: SHIPPED | OUTPATIENT
Start: 2018-09-04 | End: 2019-03-15

## 2018-09-04 RX ORDER — HYDROCHLOROTHIAZIDE 25 MG/1
25 TABLET ORAL DAILY
Qty: 90 TABLET | Refills: 1 | Status: SHIPPED | OUTPATIENT
Start: 2018-09-04 | End: 2019-04-01

## 2018-09-04 NOTE — MR AVS SNAPSHOT
After Visit Summary   9/4/2018    Tone Marley    MRN: [id number]           Patient Information     Date Of Birth          1990        Visit Information        Provider Department      9/4/2018 10:40 AM Yara Faust MD [facility]        Today's Diagnoses     Benign essential hypertension    -  1    Mixed hyperlipidemia        Chronic anticoagulation        Antiphospholipid antibody syndrome (H)        History of CVA (cerebrovascular accident)        Benign essential hypertension          Care Instructions    neurovascular appointment  Or neurology appointment            Follow-ups after your visit        Additional Services     NEUROLOGY ADULT REFERRAL       Your provider has referred you for the following:   Consult at [facility]: [facility] [phone]   [email or web address]  [facility]: [facility] [phone]   [email or web address]  N: [facility] [phone]   [email or web address]  [facility]: [facility] [phone]   [email or web address]  [place] [phone]   [email or web address]    Please be aware that coverage of these services is subject to the terms and limitations of your health insurance plan.  Call member services at your health plan with any benefit or coverage questions.      Please bring the following with you to your appointment:    (1) Any X-Rays, CTs or MRIs which have been performed.  Contact the facility where they were done to arrange for  prior to your scheduled appointment.    (2) List of current medications  (3) This referral request   (4) Any documents/labs given to you for this referral                  Your next 10 appointments already scheduled     Feb 05, 2019  9:00 AM CST   RETURN CLOTTING DISORDER with Roque Kidd PA-C  "  [facility] ([facility])    [address]  [address]  [facility] [address] [phone]              Who to contact     If you have questions or need follow up information about today's clinic visit or your schedule please contact [facility] directly at [phone].  Normal or non-critical lab and imaging results will be communicated to you by MyChart, letter or phone within 4 business days after the clinic has received the results. If you do not hear from us within 7 days, please contact the clinic through [employer] or phone. If you have a critical or abnormal lab result, we will notify you by phone as soon as possible.  Submit refill requests through [employer] or call your pharmacy and they will forward the refill request to us. Please allow 3 business days for your refill to be completed.          Additional Information About Your Visit        [employer] Information     [employer] gives you secure access to your electronic health record. If you see a primary care provider, you can also send messages to your care team and make appointments. If you have questions, please call your primary care clinic.  If you do not have a primary care provider, please call [phone] and they will assist you.        Care EveryWhere ID     This is your Care EveryWhere ID. This could be used by other organizations to access your [place] medical records  [id number]        Your Vitals Were     Pulse Temperature Respirations Height Pulse Oximetry BMI (Body Mass Index)    70 98.5  F (36.9  C) (Oral) 14 5' 11\" (1.803 m) 97% 34.45 kg/m2       Blood Pressure from Last 3 Encounters:   09/04/18 132/88   08/07/18 136/87   02/21/18 (!) 144/94    Weight from Last 3 Encounters:   09/04/18 247 lb (112 kg)   08/07/18 248 lb 3.2 oz (112.6 kg)   02/21/18 270 lb 1.6 oz (122.5 kg)              We Performed the Following     Comprehensive metabolic panel     " GGT     Lipid panel reflex to direct LDL Fasting     NEUROLOGY ADULT REFERRAL          Today's Medication Changes          These changes are accurate as of 9/4/18 12:25 PM.  If you have any questions, ask your nurse or doctor.               These medicines have changed or have updated prescriptions.        Dose/Directions    COMPRESSION STOCKINGS   This may have changed:  additional instructions   Used for:  Personal history of DVT (deep vein thrombosis), Antiphospholipid antibody syndrome (H)        Please wear compression stockings in both lower extremities most time during the day and take them off at night.   Quantity:  2 each   Refills:  2       hydrochlorothiazide 25 MG tablet   Commonly known as:  HYDRODIURIL   This may have changed:  See the new instructions.   Used for:  Benign essential hypertension   Changed by:  Yara Faust MD        Dose:  25 mg   Take 1 tablet (25 mg) by mouth daily   Quantity:  90 tablet   Refills:  1       losartan 100 MG tablet   Commonly known as:  COZAAR   This may have changed:    - medication strength  - how much to take  - how to take this  - when to take this  - additional instructions   Used for:  Benign essential hypertension   Changed by:  Yara Faust MD        Dose:  100 mg   Take 1 tablet (100 mg) by mouth daily   Quantity:  90 tablet   Refills:  1            Where to get your medicines      These medications were sent to [facility]/pharmacy #[id number] - [facility] [address] [address]     Phone:  [phone]     hydrochlorothiazide 25 MG tablet    losartan 100 MG tablet                Primary Care Provider Office Phone # Fax #    Yara Faust [phone] [address] [facility] [address]        Equal Access to Services     MIGUELINA AGUILAR : Marisela Hernandez, waayesha boles, qaybta kaalpritesh sood. So [facility] [phone].    ATENCIÓN: Jovan trinidad  español, tiene a corona disposición servicios gratuitos de asistencia lingüística. Saskia braga [phone].    We comply with applicable federal civil rights laws and [place] laws. We do not discriminate on the basis of race, color, national origin, age, disability, sex, sexual orientation, or gender identity.            Thank you!     Thank you for choosing [facility]  for your care. Our goal is always to provide you with excellent care. Hearing back from our patients is one way we can continue to improve our services. Please take a few minutes to complete the written survey that you may receive in the mail after your visit with us. Thank you!             Your Updated Medication List - Protect others around you: Learn how to safely use, store and throw away your medicines at www.disposemymeds.org.          This list is accurate as of 9/4/18 12:25 PM.  Always use your most recent med list.                   Brand Name Dispense Instructions for use Diagnosis    amphetamine-dextroamphetamine 10 MG per tablet    ADDERALL    60 tablet    Take 1 tablet (10 mg) by mouth 2 times daily    Attention deficit hyperactivity disorder (ADHD), predominantly inattentive type       ASPIRIN PO      Take 81 mg by mouth daily        atorvastatin 10 MG tablet    LIPITOR    30 tablet    Take 1 tablet (10 mg) by mouth daily    Mixed hyperlipidemia       COMPRESSION STOCKINGS     2 each    Please wear compression stockings in both lower extremities most time during the day and take them off at night.    Personal history of DVT (deep vein thrombosis), Antiphospholipid antibody syndrome (H)       hydrochlorothiazide 25 MG tablet    HYDRODIURIL    90 tablet    Take 1 tablet (25 mg) by mouth daily    Benign essential hypertension       losartan 100 MG tablet    COZAAR    90 tablet    Take 1 tablet (100 mg) by mouth daily    Benign essential hypertension       rivaroxaban ANTICOAGULANT 20 MG Tabs tablet    XARELTO    30 tablet    Take 1  tablet (20 mg) by mouth daily (with dinner)    Personal history of DVT (deep vein thrombosis), Antiphospholipid antibody syndrome (H)

## 2018-09-04 NOTE — NURSING NOTE
Chief Complaint   Patient presents with     Hypertension     follow up   Screening Questionnaire for Adult Immunization    Are you sick today?   No   Do you have allergies to medications, food, a vaccine component or latex?   No   Have you ever had a serious reaction after receiving a vaccination?   No   Do you have a long-term health problem with heart disease, lung disease, asthma, kidney disease, metabolic disease (e.g. diabetes), anemia, or other blood disorder?   No   Do you have cancer, leukemia, HIV/AIDS, or any other immune system problem?   No   In the past 3 months, have you taken medications that affect  your immune system, such as prednisone, other steroids, or anticancer drugs; drugs for the treatment of rheumatoid arthritis, Crohn s disease, or psoriasis; or have you had radiation treatments?   No   Have you had a seizure, or a brain or other nervous system problem?   No   During the past year, have you received a transfusion of blood or blood     products, or been given immune (gamma) globulin or antiviral drug?   No   For women: Are you pregnant or is there a chance you could become        pregnant during the next month?   No   Have you received any vaccinations in the past 4 weeks?   No     Immunization questionnaire answers were all negative.        Per orders of Dr. Faust, injection of Influnza given by Janel Olsen CMA. Patient instructed to remain in clinic for 15 minutes afterwards, and to report any adverse reaction to me immediately.       Screening performed by Patient on 9/4/2018 at 12:38 PM.

## 2018-09-04 NOTE — PROGRESS NOTES
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  Yes, no muscle aches from statin    Other lipid medications/supplements?:  Fish oil/Omega 3, dose multivitamin without side effects        Hypertension Follow-up      Outpatient blood pressures are being checked at home.  Results are 130's/90's to 120's /80's.    Low Salt Diet: not monitoring salt      Amount of exercise or physical activity: walking 3-4 days a week-4 mile walk with dog    Problems taking medications regularly: No    Medication side effects: none    Diet: low fat/cholesterol and lots of veggies, healthy fats, protiens        PROBLEMS TO ADD ON...significant life changes- due to medical health  'broke up with his fiance, a week before wedding  Was drinking heavily & has improved that   No toabcco & alcohol abuse since July 2018  Denies depression and anxiety  Family and family have come through for him     Problem list and histories reviewed & adjusted, as indicated.  Additional history: as documented    Patient Active Problem List   Diagnosis     Antiphospholipid syndrome (H)     Mixed hyperlipidemia     Benign essential hypertension     Attention deficit hyperactivity disorder (ADHD), predominantly inattentive type     History of pulmonary embolism     Postphlebitic syndrome     Pulmonary embolism (H)     Chronic anticoagulation     Class 2 obesity due to excess calories without serious comorbidity with body mass index  "(BMI) of 37.0 to 37.9 in adult     Past Surgical History:   Procedure Laterality Date     wisdom teeth extraction         Social History   Substance Use Topics     Smoking status: Never Smoker     Smokeless tobacco: Former User     Types: Chew     Quit date: 07/2018      Comment: Chew maybe once per week.     Alcohol use No      Comment:  quit post stroke 7-2018     Family History   Problem Relation Age of Onset     Asthma Mother      Family History Negative Father      Heart Surgery Father      C.A.D. Paternal Uncle 42           Reviewed and updated as needed this visit by clinical staff  Tobacco  Allergies  Meds  Med Hx  Surg Hx  Fam Hx  Soc Hx      Reviewed and updated as needed this visit by Provider         ROS:  Constitutional, HEENT, cardiovascular, pulmonary, gi and gu systems are negative, except as otherwise noted.    OBJECTIVE:     /88  Pulse 70  Temp 98.5  F (36.9  C) (Oral)  Resp 14  Ht 5' 11\" (1.803 m)  Wt 247 lb (112 kg)  SpO2 97%  BMI 34.45 kg/m2  Body mass index is 34.45 kg/(m^2).  GENERAL: healthy, alert and no distress  NECK: no adenopathy, no asymmetry, masses, or scars and thyroid normal to palpation  RESP: lungs clear to auscultation - no rales, rhonchi or wheezes  CV: regular rate and rhythm, normal S1 S2, no S3 or S4, no murmur, click or rub, no peripheral edema and peripheral pulses strong  ABDOMEN: soft, nontender, no hepatosplenomegaly, no masses and bowel sounds normal  MS: no gross musculoskeletal defects noted, no edema  PSYCH: mentation appears normal, affect normal/bright        ASSESSMENT/PLAN:     History of CVA (cerebrovascular accident)  - NEUROLOGY ADULT REFERRAL  -continue with asprin, needs improvement in chest pain control & cholestrol      Benign essential hypertension  BP Readings from Last 3 Encounters:   09/04/18 132/88   08/07/18 136/87   02/21/18 (!) 144/94     Initial Blood pressure was higher than target  Recheck is better  Patient is encouraged to " follow low salt diet, add at least 3-5 serves of fresh veggies in diet, add any physical activity of choice    Med refill provided .Follow up 1 month  earlier if concerns if home monitoring of BP > 130/90    - Comprehensive metabolic panel  - GGT  - losartan (COZAAR) 100 MG tablet; Take 1 tablet (100 mg) by mouth daily  Dispense: 90 tablet; Refill: 1  - hydrochlorothiazide (HYDRODIURIL) 25 MG tablet; Take 1 tablet (25 mg) by mouth daily  Dispense: 90 tablet; Refill: 1    Mixed hyperlipidemia  Plan:recheck today  - atorvastatin (LIPITOR) 10 MG tablet; Take 1 tablet (10 mg) by mouth daily  Dispense: 30 tablet; Refill: 1  - Lipid panel reflex to direct LDL Fasting    Antiphospholipid antibody syndrome (H)    - NEUROLOGY ADULT REFERRAL    Chronic anticoagulation  Plan. No medications changes  He has a history of DVT & PE (on xarelto)     Need for influenza vaccination    - FLU VAC PRESRV FREE QUAD SPLIT VIR, IM (3+ YRS)  - ADMIN 1st VACCINE    Situational stress  Plan: significant life changes- due to medical health  'broke up with his fiance, a week before wedding  Was drinking heavily & has improved that   No toabcco & alcohol abuse since July 2018  encouraged to keep up the good work  Offered counseling and he will keep us posted if needs it    Yara Faust MD  [facility]

## 2018-09-04 NOTE — NURSING NOTE
"Chief Complaint   Patient presents with     Hypertension     follow up       Initial BP (!) 136/92  Pulse 70  Temp 98.5  F (36.9  C) (Oral)  Resp 14  Ht 5' 11\" (1.803 m)  Wt 247 lb (112 kg)  SpO2 97%  BMI 34.45 kg/m2 Estimated body mass index is 34.45 kg/(m^2) as calculated from the following:    Height as of this encounter: 5' 11\" (1.803 m).    Weight as of this encounter: 247 lb (112 kg).  BP completed using cuff size: large    Health Maintenance that is potentially due pending provider review:  Health Maintenance Due   Topic Date Due     EYE EXAM Q1 YEAR  06/30/2017     PHQ-2 Q1 YR  08/18/2017     INFLUENZA VACCINE (1) 09/01/2018         Flu declined today  "

## 2018-09-05 LAB
ALBUMIN SERPL-MCNC: 4 G/DL (ref 3.4–5)
ALP SERPL-CCNC: 90 U/L (ref 40–150)
ALT SERPL W P-5'-P-CCNC: 82 U/L (ref 0–70)
ANION GAP SERPL CALCULATED.3IONS-SCNC: 8 MMOL/L (ref 3–14)
AST SERPL W P-5'-P-CCNC: 39 U/L (ref 0–45)
BILIRUB SERPL-MCNC: 0.7 MG/DL (ref 0.2–1.3)
BUN SERPL-MCNC: 14 MG/DL (ref 7–30)
CALCIUM SERPL-MCNC: 9.1 MG/DL (ref 8.5–10.1)
CHLORIDE SERPL-SCNC: 106 MMOL/L (ref 94–109)
CHOLEST SERPL-MCNC: 121 MG/DL
CO2 SERPL-SCNC: 27 MMOL/L (ref 20–32)
CREAT SERPL-MCNC: 0.96 MG/DL (ref 0.66–1.25)
GFR SERPL CREATININE-BSD FRML MDRD: >90 ML/MIN/1.7M2
GGT SERPL-CCNC: 32 U/L (ref 0–75)
GLUCOSE SERPL-MCNC: 95 MG/DL (ref 70–99)
HDLC SERPL-MCNC: 30 MG/DL
LDLC SERPL CALC-MCNC: 59 MG/DL
NONHDLC SERPL-MCNC: 91 MG/DL
POTASSIUM SERPL-SCNC: 3.9 MMOL/L (ref 3.4–5.3)
PROT SERPL-MCNC: 7.8 G/DL (ref 6.8–8.8)
SODIUM SERPL-SCNC: 141 MMOL/L (ref 133–144)
TRIGL SERPL-MCNC: 162 MG/DL

## 2018-09-06 NOTE — PROGRESS NOTES
-Liver and gallbladder tests are normal. (AST,Alk phos, bilirubin), kidney function is normal (Cr, GFR), Sodium is normal, Potassium is normal, Calcium is normal, Glucose is normal (diabetes screening test).   AST is still slightly high at 82 but improved from  Before- that's great. Should be rechecked periodically.    -Cholesterol levels (LDL, Triglycerides) are normal.    HDL,- good cholesterol is low and that increases the risk for heart disease  Following routine aerobic excercise and healthy diet- with improved focus on fresh unprocessed food improves the HDL as well  ADVISE: rechecking in 1 year.

## 2018-09-20 DIAGNOSIS — D68.61 ANTIPHOSPHOLIPID ANTIBODY SYNDROME (H): ICD-10-CM

## 2018-09-20 DIAGNOSIS — Z86.718 PERSONAL HISTORY OF DVT (DEEP VEIN THROMBOSIS): ICD-10-CM

## 2018-09-28 ENCOUNTER — MYC MEDICAL ADVICE (OUTPATIENT)
Dept: FAMILY MEDICINE | Facility: CLINIC | Age: 28
End: 2018-09-28

## 2018-10-03 ENCOUNTER — TELEPHONE (OUTPATIENT)
Dept: OTHER | Facility: CLINIC | Age: 28
End: 2018-10-03

## 2018-10-03 ENCOUNTER — OFFICE VISIT (OUTPATIENT)
Dept: FAMILY MEDICINE | Facility: CLINIC | Age: 28
End: 2018-10-03
Payer: COMMERCIAL

## 2018-10-03 VITALS
HEIGHT: 71 IN | BODY MASS INDEX: 35.94 KG/M2 | SYSTOLIC BLOOD PRESSURE: 133 MMHG | HEART RATE: 86 BPM | OXYGEN SATURATION: 97 % | DIASTOLIC BLOOD PRESSURE: 88 MMHG | WEIGHT: 256.7 LBS | TEMPERATURE: 97.6 F

## 2018-10-03 DIAGNOSIS — Z86.73 S/P STROKE DUE TO CEREBROVASCULAR DISEASE: ICD-10-CM

## 2018-10-03 DIAGNOSIS — E78.2 MIXED HYPERLIPIDEMIA: ICD-10-CM

## 2018-10-03 DIAGNOSIS — E66.01 MORBID OBESITY (H): ICD-10-CM

## 2018-10-03 DIAGNOSIS — Z23 NEED FOR PROPHYLACTIC VACCINATION AND INOCULATION AGAINST INFLUENZA: ICD-10-CM

## 2018-10-03 DIAGNOSIS — E66.09 CLASS 2 OBESITY DUE TO EXCESS CALORIES WITHOUT SERIOUS COMORBIDITY WITH BODY MASS INDEX (BMI) OF 37.0 TO 37.9 IN ADULT: ICD-10-CM

## 2018-10-03 DIAGNOSIS — F90.0 ATTENTION DEFICIT HYPERACTIVITY DISORDER (ADHD), PREDOMINANTLY INATTENTIVE TYPE: ICD-10-CM

## 2018-10-03 DIAGNOSIS — D68.61 ANTIPHOSPHOLIPID ANTIBODY SYNDROME (H): ICD-10-CM

## 2018-10-03 DIAGNOSIS — I77.74 DISSECTING HEMORRHAGE OF RIGHT VERTEBRAL ARTERY (H): Primary | ICD-10-CM

## 2018-10-03 DIAGNOSIS — I10 BENIGN ESSENTIAL HYPERTENSION: ICD-10-CM

## 2018-10-03 DIAGNOSIS — E66.812 CLASS 2 OBESITY DUE TO EXCESS CALORIES WITHOUT SERIOUS COMORBIDITY WITH BODY MASS INDEX (BMI) OF 37.0 TO 37.9 IN ADULT: ICD-10-CM

## 2018-10-03 PROCEDURE — 99214 OFFICE O/P EST MOD 30 MIN: CPT | Performed by: FAMILY MEDICINE

## 2018-10-03 RX ORDER — ATORVASTATIN CALCIUM 10 MG/1
10 TABLET, FILM COATED ORAL DAILY
Qty: 90 TABLET | Refills: 1 | Status: SHIPPED | OUTPATIENT
Start: 2018-10-03 | End: 2019-04-01

## 2018-10-03 NOTE — TELEPHONE ENCOUNTER
Pt referred to [facility] by Yara Faust MD  for Dissecting hemorrhage of right vertebral artery and S/P CVA.    Patient records from [facility] in care everywhere- calling to have MRA head/neck pushed to [place].     Pt needs to be scheduled for consult with vascular surgery.  Will route to scheduling to coordinate an appointment at next available.    Melanie Cross, OSMANYN, RN

## 2018-10-03 NOTE — PROGRESS NOTES
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  Yes, no muscle aches from statin    Other lipid medications/supplements?:  Fish oil/Omega 3, without side effects    Hypertension Follow-up      Outpatient blood pressures are being checked at home.  Results are 140s/90s.    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    Problem list and histories reviewed & adjusted, as indicated.  Additional history: as documented    Patient Active Problem List   Diagnosis     Antiphospholipid syndrome (H)     Mixed hyperlipidemia     Benign essential hypertension     Attention deficit hyperactivity disorder (ADHD), predominantly inattentive type     History of pulmonary embolism      "Postphlebitic syndrome     Pulmonary embolism (H)     Chronic anticoagulation     Class 2 obesity due to excess calories without serious comorbidity with body mass index (BMI) of 37.0 to 37.9 in adult     Obesity (BMI 35.0-39.9) with comorbidity (H)     Past Surgical History:   Procedure Laterality Date     wisdom teeth extraction         Social History   Substance Use Topics     Smoking status: Never Smoker     Smokeless tobacco: Former User     Types: Chew     Quit date: 07/2018      Comment: Chew maybe once per week.     Alcohol use No      Comment:  quit post stroke 7-2018     Family History   Problem Relation Age of Onset     Asthma Mother      Family History Negative Father      Heart Surgery Father      C.A.D. Paternal Uncle 42           Reviewed and updated as needed this visit by clinical staff  Tobacco  Allergies  Meds       Reviewed and updated as needed this visit by Provider         ROS:  Constitutional, HEENT, cardiovascular, pulmonary, gi and gu systems are negative, except as otherwise noted.    OBJECTIVE:     /88  Pulse 86  Temp 97.6  F (36.4  C) (Oral)  Ht 5' 11\" (1.803 m)  Wt 256 lb 11.2 oz (116.4 kg)  SpO2 97%  BMI 35.8 kg/m2  Body mass index is 35.8 kg/(m^2).  GENERAL: healthy, alert and no distress  NECK: no adenopathy, no asymmetry, masses, or scars and thyroid normal to palpation  RESP: lungs clear to auscultation - no rales, rhonchi or wheezes  CV: regular rate and rhythm, normal S1 S2, no S3 or S4, no murmur, click or rub, no peripheral edema and peripheral pulses strong  ABDOMEN: soft, nontender, no hepatosplenomegaly, no masses and bowel sounds normal  MS: no gross musculoskeletal defects noted, no edema  PSYCH: mentation appears normal, affect normal/bright    Diagnostic Test Results:    ASSESSMENT/PLAN:     1. Dissecting hemorrhage of right vertebral artery (H)  History of multiple PE/ DVT  History of antiphospholipid syndrome  Plan: no follow up completed  I do recommend " - VASCULAR SURGERY REFERRAL  For recheck on the MRA  Continue with asprin once daily   Continue with xarelto     2. S/P stroke due to cerebrovascular disease  - VASCULAR SURGERY REFERRAL  Plan: stable      3. Benign essential hypertension  Controlled on HCTZ25 mg once daily & losartan 100 mg once daily   Plan: has been given refill this month    4. Mixed hyperlipidemia  Plan: refill is given  Fasting lipid in 6 months   - atorvastatin (LIPITOR) 10 MG tablet; Take 1 tablet (10 mg) by mouth daily  Dispense: 90 tablet; Refill: 1    5. Morbid obesity (H)  Plan: encouraged healthy life style changes  He is trying to get active again- has not tried brisk walking yet- encouraged to do so    6. Attention deficit hyperactivity disorder (ADHD), predominantly inattentive type  Plan: not wanting/ needing adderall.                  Yara Faust MD  [facility]

## 2018-10-03 NOTE — MR AVS SNAPSHOT
After Visit Summary   10/3/2018    Tone Marley    MRN: [id number]           Patient Information     Date Of Birth          1990        Visit Information        Provider Department      10/3/2018 11:00 AM Yara Faust MD [facility]        Today's Diagnoses     Benign essential hypertension    -  1    Dissecting hemorrhage of right vertebral artery (H)        Mixed hyperlipidemia        Morbid obesity (H)        Attention deficit hyperactivity disorder (ADHD), predominantly inattentive type        S/P stroke due to cerebrovascular disease          Care Instructions    xeraltoe vs fliter          Follow-ups after your visit        Additional Services     VASCULAR SURGERY REFERRAL       Your provider has referred you to: **Vascular  Services [phone] - Vascular Medicine Management & MR MRA head and neck- follow up right vertebral artery dissection from    [email or web address]  /    Please be aware that coverage of these services is subject to the terms and limitations of your health insurance plan.  Call member services at your health plan with any benefit or coverage questions.      Please bring the following with you to your appointment:    (1) Any X-Rays, CTs or MRIs which have been performed.  Contact the facility where they were done to arrange for  prior to your scheduled appointment.    (2) List of current medications   (3) This referral request   (4) Any documents/labs given to you for this referral                  Your next 10 appointments already scheduled     Feb 05, 2019  9:00 AM CST   RETURN CLOTTING DISORDER with Roque Kidd PA-C   [facility] ([facility])    [facility] [place] [id number] [phone]              Who to contact     If you have questions or need follow up information about today's clinic visit  "or your schedule please contact [facility] directly at [phone].  Normal or non-critical lab and imaging results will be communicated to you by [employer], letter or phone within 4 business days after the clinic has received the results. If you do not hear from us within 7 days, please contact the clinic through [employer] or phone. If you have a critical or abnormal lab result, we will notify you by phone as soon as possible.  Submit refill requests through [employer] or call your pharmacy and they will forward the refill request to us. Please allow 3 business days for your refill to be completed.          Additional Information About Your Visit        [employer] Information     [employer] gives you secure access to your electronic health record. If you see a primary care provider, you can also send messages to your care team and make appointments. If you have questions, please call your primary care clinic.  If you do not have a primary care provider, please call [phone] and they will assist you.        Care EveryWhere ID     This is your Care EveryWhere ID. This could be used by other organizations to access your [facility] medical records  [id number]        Your Vitals Were     Pulse Temperature Height Pulse Oximetry BMI (Body Mass Index)       86 97.6  F (36.4  C) (Oral) 5' 11\" (1.803 m) 97% 35.8 kg/m2        Blood Pressure from Last 3 Encounters:   10/03/18 133/88   09/04/18 132/88   08/07/18 136/87    Weight from Last 3 Encounters:   10/03/18 256 lb 11.2 oz (116.4 kg)   09/04/18 247 lb (112 kg)   08/07/18 248 lb 3.2 oz (112.6 kg)              We Performed the Following     VASCULAR SURGERY REFERRAL          Today's Medication Changes          These changes are accurate as of 10/3/18 12:09 PM.  If you have any questions, ask your nurse or doctor.               These medicines have changed or have updated prescriptions.        Dose/Directions    COMPRESSION STOCKINGS   This may have changed:  additional " instructions   Used for:  Personal history of DVT (deep vein thrombosis), Antiphospholipid antibody syndrome (H)        Please wear compression stockings in both lower extremities most time during the day and take them off at night.   Quantity:  2 each   Refills:  2            Where to get your medicines      These medications were sent to [facility]/pharmacy #[id number] - [place], [place] - [address]  [address] [address]     Phone:  [phone]     atorvastatin 10 MG tablet                Primary Care Provider Office Phone # Fax #    Yara Faust [phone] [address] [address]        Equal Access to Services     [facility] : Hadii brina leon hadduyo David, waaxda lulakshmi, qaybta kaalmada sarahy, pritesh south. So [facility] [phone].    ATENCIÓN: Si habla español, tiene a corona disposición servicios gratuitos de asistencia lingüística. [facility] [phone].    We comply with applicable federal civil rights laws and [place] laws. We do not discriminate on the basis of race, color, national origin, age, disability, sex, sexual orientation, or gender identity.            Thank you!     Thank you for choosing [facility]  for your care. Our goal is always to provide you with excellent care. Hearing back from our patients is one way we can continue to improve our services. Please take a few minutes to complete the written survey that you may receive in the mail after your visit with us. Thank you!             Your Updated Medication List - Protect others around you: Learn how to safely use, store and throw away your medicines at www.disposemymeds.org.          This list is accurate as of 10/3/18 12:09 PM.  Always use your most recent med list.                   Brand Name Dispense Instructions for use Diagnosis    amphetamine-dextroamphetamine 10 MG per tablet    ADDERALL    60 tablet    Take 1 tablet (10 mg) by mouth 2 times  daily    Attention deficit hyperactivity disorder (ADHD), predominantly inattentive type       ASPIRIN PO      Take 81 mg by mouth daily        atorvastatin 10 MG tablet    LIPITOR    90 tablet    Take 1 tablet (10 mg) by mouth daily    Mixed hyperlipidemia       COMPRESSION STOCKINGS     2 each    Please wear compression stockings in both lower extremities most time during the day and take them off at night.    Personal history of DVT (deep vein thrombosis), Antiphospholipid antibody syndrome (H)       FISH OIL PO           hydrochlorothiazide 25 MG tablet    HYDRODIURIL    90 tablet    Take 1 tablet (25 mg) by mouth daily    Benign essential hypertension       losartan 100 MG tablet    COZAAR    90 tablet    Take 1 tablet (100 mg) by mouth daily    Benign essential hypertension       rivaroxaban ANTICOAGULANT 20 MG Tabs tablet    XARELTO    30 tablet    Take 1 tablet (20 mg) by mouth daily (with dinner)    Personal history of DVT (deep vein thrombosis), Antiphospholipid antibody syndrome (H)

## 2018-11-19 DIAGNOSIS — I10 BENIGN ESSENTIAL HYPERTENSION: ICD-10-CM

## 2018-11-19 RX ORDER — LOSARTAN POTASSIUM 50 MG/1
TABLET ORAL
Start: 2018-11-19

## 2018-11-19 NOTE — TELEPHONE ENCOUNTER
"Too soon.  Rx sent 9/4/18 for #90 with 1 refill (100mg tabs)  Kaylie Wilde RN    Requested Prescriptions   Refused Prescriptions Disp Refills     losartan (COZAAR) 50 MG tablet [Pharmacy Med Name: LOSARTAN POTASSIUM 50 MG TAB]       Sig: TAKE 2 TABLETS (100 MG) BY MOUTH DAILY    Angiotensin-II Receptors Passed    11/19/2018  3:45 AM       Passed - Blood pressure under 140/90 in past 12 months    BP Readings from Last 3 Encounters:   10/03/18 133/88   09/04/18 132/88   08/07/18 136/87                Passed - Recent (12 mo) or future (30 days) visit within the authorizing provider's specialty    Patient had office visit in the last 12 months or has a visit in the next 30 days with authorizing provider or within the authorizing provider's specialty.  See \"Patient Info\" tab in inbasket, or \"Choose Columns\" in Meds & Orders section of the refill encounter.             Passed - Patient is age 18 or older       Passed - Normal serum creatinine on file in past 12 months    Recent Labs   Lab Test  09/04/18   1217   CR  0.96            Passed - Normal serum potassium on file in past 12 months    Recent Labs   Lab Test  09/04/18   1217   POTASSIUM  3.9                      "

## 2018-12-06 ENCOUNTER — TRANSFERRED RECORDS (OUTPATIENT)
Dept: HEALTH INFORMATION MANAGEMENT | Facility: CLINIC | Age: 28
End: 2018-12-06

## 2019-03-15 DIAGNOSIS — I10 BENIGN ESSENTIAL HYPERTENSION: ICD-10-CM

## 2019-03-15 RX ORDER — LOSARTAN POTASSIUM 100 MG/1
TABLET ORAL
Qty: 90 TABLET | Refills: 1 | Status: SHIPPED | OUTPATIENT
Start: 2019-03-15 | End: 2019-04-01

## 2019-03-15 NOTE — TELEPHONE ENCOUNTER
"Prescription approved per [facility] [facility].  Kaylie Wilde RN    Requested Prescriptions   Signed Prescriptions Disp Refills     losartan (COZAAR) 100 MG tablet 90 tablet 1     Sig: TAKE 1 TABLET BY MOUTH EVERY DAY    Angiotensin-II Receptors Passed - 3/15/2019  1:24 AM       Passed - Blood pressure under 140/90 in past 12 months    BP Readings from Last 3 Encounters:   10/03/18 133/88   09/04/18 132/88   08/07/18 136/87                Passed - Recent (12 mo) or future (30 days) visit within the authorizing provider's specialty    Patient had office visit in the last 12 months or has a visit in the next 30 days with authorizing provider or within the authorizing provider's specialty.  See \"Patient Info\" tab in inbasket, or \"Choose Columns\" in Meds & Orders section of the refill encounter.             Passed - Medication is active on med list       Passed - Patient is age 18 or older       Passed - Normal serum creatinine on file in past 12 months    Recent Labs   Lab Test 09/04/18  1217   CR 0.96            Passed - Normal serum potassium on file in past 12 months    Recent Labs   Lab Test 09/04/18  1217   POTASSIUM 3.9                      "

## 2019-04-01 ENCOUNTER — OFFICE VISIT (OUTPATIENT)
Dept: FAMILY MEDICINE | Facility: CLINIC | Age: 29
End: 2019-04-01
Payer: COMMERCIAL

## 2019-04-01 VITALS
SYSTOLIC BLOOD PRESSURE: 129 MMHG | OXYGEN SATURATION: 99 % | WEIGHT: 274.4 LBS | BODY MASS INDEX: 38.42 KG/M2 | HEIGHT: 71 IN | HEART RATE: 71 BPM | DIASTOLIC BLOOD PRESSURE: 87 MMHG | TEMPERATURE: 98 F

## 2019-04-01 DIAGNOSIS — Z86.73 HISTORY OF CVA (CEREBROVASCULAR ACCIDENT): ICD-10-CM

## 2019-04-01 DIAGNOSIS — E66.812 CLASS 2 OBESITY DUE TO EXCESS CALORIES WITHOUT SERIOUS COMORBIDITY WITH BODY MASS INDEX (BMI) OF 38.0 TO 38.9 IN ADULT: ICD-10-CM

## 2019-04-01 DIAGNOSIS — I10 BENIGN ESSENTIAL HYPERTENSION: Primary | ICD-10-CM

## 2019-04-01 DIAGNOSIS — E66.09 CLASS 2 OBESITY DUE TO EXCESS CALORIES WITHOUT SERIOUS COMORBIDITY WITH BODY MASS INDEX (BMI) OF 38.0 TO 38.9 IN ADULT: ICD-10-CM

## 2019-04-01 DIAGNOSIS — E78.2 MIXED HYPERLIPIDEMIA: ICD-10-CM

## 2019-04-01 DIAGNOSIS — Z79.01 CHRONIC ANTICOAGULATION: ICD-10-CM

## 2019-04-01 DIAGNOSIS — D68.61 ANTIPHOSPHOLIPID SYNDROME (H): ICD-10-CM

## 2019-04-01 DIAGNOSIS — E66.01 MORBID OBESITY (H): ICD-10-CM

## 2019-04-01 PROCEDURE — 99214 OFFICE O/P EST MOD 30 MIN: CPT | Performed by: FAMILY MEDICINE

## 2019-04-01 RX ORDER — ATORVASTATIN CALCIUM 10 MG/1
10 TABLET, FILM COATED ORAL DAILY
Qty: 90 TABLET | Refills: 3 | Status: SHIPPED | OUTPATIENT
Start: 2019-04-01 | End: 2020-04-13

## 2019-04-01 RX ORDER — HYDROCHLOROTHIAZIDE 25 MG/1
25 TABLET ORAL DAILY
Qty: 90 TABLET | Refills: 3 | Status: SHIPPED | OUTPATIENT
Start: 2019-04-01 | End: 2020-04-13

## 2019-04-01 RX ORDER — DEXTROAMPHETAMINE SACCHARATE, AMPHETAMINE ASPARTATE, DEXTROAMPHETAMINE SULFATE AND AMPHETAMINE SULFATE 2.5; 2.5; 2.5; 2.5 MG/1; MG/1; MG/1; MG/1
10 TABLET ORAL 2 TIMES DAILY
Qty: 60 TABLET | Refills: 0 | Status: CANCELLED | OUTPATIENT
Start: 2019-04-01

## 2019-04-01 RX ORDER — LOSARTAN POTASSIUM 100 MG/1
100 TABLET ORAL DAILY
Qty: 90 TABLET | Refills: 3 | Status: SHIPPED | OUTPATIENT
Start: 2019-04-01 | End: 2020-04-13

## 2019-04-01 ASSESSMENT — MIFFLIN-ST. JEOR: SCORE: 2221.8

## 2019-04-01 NOTE — PROGRESS NOTES
SUBJECTIVE:   Tone Marley is a 29 year old male who presents to clinic today for the following health issues:      Hyperlipidemia Follow-Up      Rate your low fat/cholesterol diet?: not monitoring fat    Taking statin?  Yes, no muscle aches from statin    Other lipid medications/supplements?:  none    Hypertension Follow-up      Outpatient blood pressures are not being checked.    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  Antiphospholipid antibody syndrome.  Currently on chronic anticoagulation therapy with Xarelto Under care of BRYAN Aguilar        PROBLEMS TO ADD ON...  We also visited his weight gain  He reports he is doing better now  Settled in [place], works full time [employer] apartment & able to prepare better meals   He is motivated for intentional weight loss    Problem list and histories reviewed & adjusted, as indicated.  Additional history: as documented    Patient Active Problem List   Diagnosis     Antiphospholipid syndrome (H)     Mixed hyperlipidemia     Benign essential hypertension     Attention deficit hyperactivity disorder (ADHD), predominantly inattentive type     History of pulmonary embolism     Postphlebitic syndrome     Pulmonary embolism (H)     Chronic anticoagulation     Class 2 obesity due to excess calories without serious comorbidity with body mass index (BMI) of 37.0 to 37.9 in adult     Obesity (BMI  "35.0-39.9) with comorbidity (H)     Antiphospholipid antibody syndrome (H)     History of CVA (cerebrovascular accident)     Past Surgical History:   Procedure Laterality Date     wisdom teeth extraction         Social History     Tobacco Use     Smoking status: Never Smoker     Smokeless tobacco: Former User     Types: Chew     Tobacco comment: Chew maybe once per week.   Substance Use Topics     Alcohol use: No     Alcohol/week: 0.0 oz     Comment:  quit post stroke 7-2018     Family History   Problem Relation Age of Onset     Asthma Mother      Family History Negative Father      Heart Surgery Father      C.A.D. Paternal Uncle 42           Reviewed and updated as needed this visit by clinical staff       Reviewed and updated as needed this visit by Provider         ROS:  Constitutional, HEENT, cardiovascular, pulmonary, GI, , musculoskeletal, neuro, skin, endocrine and psych systems are negative, except as otherwise noted.    OBJECTIVE:     /87   Pulse 71   Temp 98  F (36.7  C) (Oral)   Ht 1.803 m (5' 11\")   Wt 124.5 kg (274 lb 6.4 oz)   SpO2 99%   BMI 38.27 kg/m    Body mass index is 38.27 kg/m .  GENERAL: healthy, alert and no distress  HENT: ear canals and TM's normal, nose and mouth without ulcers or lesions  NECK: no adenopathy, no asymmetry, masses, or scars and thyroid normal to palpation  RESP: lungs clear to auscultation - no rales, rhonchi or wheezes  CV: regular rate and rhythm, normal S1 S2, no S3 or S4, no murmur, click or rub, no peripheral edema and peripheral pulses strong  ABDOMEN: soft, nontender, no hepatosplenomegaly, no masses and bowel sounds normal  MS: no gross musculoskeletal defects noted, no edema    Diagnostic Test Results:    ASSESSMENT/PLAN:   1. Benign essential hypertension  Plan: Well controlled.  Refilled medication for a year.  Labs future as he would like to get them fasting  - hydrochlorothiazide (HYDRODIURIL) 25 MG tablet; Take 1 tablet (25 mg) by mouth daily  " Dispense: 90 tablet; Refill: 3  - losartan (COZAAR) 100 MG tablet; Take 1 tablet (100 mg) by mouth daily  Dispense: 90 tablet; Refill: 3  - Comprehensive metabolic panel (BMP + Alb, Alk Phos, ALT, AST, Total. Bili, TP); Future    2. Mixed hyperlipidemia  Plan: refilled   - atorvastatin (LIPITOR) 10 MG tablet; Take 1 tablet (10 mg) by mouth daily  Dispense: 90 tablet; Refill: 3  - Lipid panel reflex to direct LDL Fasting; Future    3. Antiphospholipid syndrome (H)  4. Chronic anticoagulation  Stable .  He is being followed at hematology clinic by Roque Kidd PA-C  Next visit due June 2019.  5. 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

## 2019-04-01 NOTE — NURSING NOTE
"Chief Complaint   Patient presents with     Hyperlipidemia     Hypertension     /87   Pulse 71   Temp 98  F (36.7  C) (Oral)   Ht 1.803 m (5' 11\")   Wt 124.5 kg (274 lb 6.4 oz)   SpO2 99%   BMI 38.27 kg/m   Estimated body mass index is 38.27 kg/m  as calculated from the following:    Height as of this encounter: 1.803 m (5' 11\").    Weight as of this encounter: 124.5 kg (274 lb 6.4 oz).  Medication Reconciliation: complete      Health Maintenance that is potentially due pending provider review:  NONE    n/a    CORRINA Washburn  "

## 2019-08-20 ENCOUNTER — OFFICE VISIT (OUTPATIENT)
Dept: FAMILY MEDICINE | Facility: CLINIC | Age: 29
End: 2019-08-20
Payer: COMMERCIAL

## 2019-08-20 VITALS
SYSTOLIC BLOOD PRESSURE: 138 MMHG | RESPIRATION RATE: 14 BRPM | HEART RATE: 83 BPM | DIASTOLIC BLOOD PRESSURE: 86 MMHG | OXYGEN SATURATION: 96 % | BODY MASS INDEX: 37.52 KG/M2 | HEIGHT: 71 IN | WEIGHT: 268 LBS

## 2019-08-20 DIAGNOSIS — Z11.3 SCREEN FOR STD (SEXUALLY TRANSMITTED DISEASE): ICD-10-CM

## 2019-08-20 DIAGNOSIS — E78.2 MIXED HYPERLIPIDEMIA: ICD-10-CM

## 2019-08-20 DIAGNOSIS — Z86.73 HISTORY OF CVA (CEREBROVASCULAR ACCIDENT): ICD-10-CM

## 2019-08-20 DIAGNOSIS — D68.61 ANTIPHOSPHOLIPID ANTIBODY SYNDROME (H): ICD-10-CM

## 2019-08-20 DIAGNOSIS — L71.9 ROSACEA: ICD-10-CM

## 2019-08-20 DIAGNOSIS — E66.812 CLASS 2 OBESITY DUE TO EXCESS CALORIES WITHOUT SERIOUS COMORBIDITY WITH BODY MASS INDEX (BMI) OF 37.0 TO 37.9 IN ADULT: ICD-10-CM

## 2019-08-20 DIAGNOSIS — E66.09 CLASS 2 OBESITY DUE TO EXCESS CALORIES WITHOUT SERIOUS COMORBIDITY WITH BODY MASS INDEX (BMI) OF 37.0 TO 37.9 IN ADULT: ICD-10-CM

## 2019-08-20 DIAGNOSIS — I10 BENIGN ESSENTIAL HYPERTENSION: Primary | ICD-10-CM

## 2019-08-20 DIAGNOSIS — R79.89 LOW TESTOSTERONE IN MALE: ICD-10-CM

## 2019-08-20 LAB
ALBUMIN SERPL-MCNC: 4.1 G/DL (ref 3.4–5)
ALP SERPL-CCNC: 98 U/L (ref 40–150)
ALT SERPL W P-5'-P-CCNC: 95 U/L (ref 0–70)
ANION GAP SERPL CALCULATED.3IONS-SCNC: 8 MMOL/L (ref 3–14)
AST SERPL W P-5'-P-CCNC: 46 U/L (ref 0–45)
BILIRUB SERPL-MCNC: 0.9 MG/DL (ref 0.2–1.3)
BUN SERPL-MCNC: 15 MG/DL (ref 7–30)
CALCIUM SERPL-MCNC: 9.3 MG/DL (ref 8.5–10.1)
CHLORIDE SERPL-SCNC: 107 MMOL/L (ref 94–109)
CHOLEST SERPL-MCNC: 139 MG/DL
CO2 SERPL-SCNC: 23 MMOL/L (ref 20–32)
CREAT SERPL-MCNC: 0.85 MG/DL (ref 0.66–1.25)
GFR SERPL CREATININE-BSD FRML MDRD: >90 ML/MIN/{1.73_M2}
GLUCOSE SERPL-MCNC: 107 MG/DL (ref 70–99)
HBV SURFACE AG SERPL QL IA: NONREACTIVE
HCV AB SERPL QL IA: NONREACTIVE
HDLC SERPL-MCNC: 34 MG/DL
HIV 1+2 AB+HIV1 P24 AG SERPL QL IA: NONREACTIVE
LDLC SERPL CALC-MCNC: 71 MG/DL
NONHDLC SERPL-MCNC: 105 MG/DL
POTASSIUM SERPL-SCNC: 4.1 MMOL/L (ref 3.4–5.3)
PROT SERPL-MCNC: 8.1 G/DL (ref 6.8–8.8)
SODIUM SERPL-SCNC: 138 MMOL/L (ref 133–144)
T PALLIDUM AB SER QL: NONREACTIVE
TRIGL SERPL-MCNC: 171 MG/DL

## 2019-08-20 PROCEDURE — 84403 ASSAY OF TOTAL TESTOSTERONE: CPT | Performed by: FAMILY MEDICINE

## 2019-08-20 PROCEDURE — 80061 LIPID PANEL: CPT | Performed by: FAMILY MEDICINE

## 2019-08-20 PROCEDURE — 87389 HIV-1 AG W/HIV-1&-2 AB AG IA: CPT | Performed by: FAMILY MEDICINE

## 2019-08-20 PROCEDURE — 80053 COMPREHEN METABOLIC PANEL: CPT | Performed by: FAMILY MEDICINE

## 2019-08-20 PROCEDURE — 99214 OFFICE O/P EST MOD 30 MIN: CPT | Performed by: FAMILY MEDICINE

## 2019-08-20 PROCEDURE — 87340 HEPATITIS B SURFACE AG IA: CPT | Performed by: FAMILY MEDICINE

## 2019-08-20 PROCEDURE — 86780 TREPONEMA PALLIDUM: CPT | Performed by: FAMILY MEDICINE

## 2019-08-20 PROCEDURE — 86803 HEPATITIS C AB TEST: CPT | Performed by: FAMILY MEDICINE

## 2019-08-20 PROCEDURE — 87491 CHLMYD TRACH DNA AMP PROBE: CPT | Performed by: FAMILY MEDICINE

## 2019-08-20 PROCEDURE — 87591 N.GONORRHOEAE DNA AMP PROB: CPT | Performed by: FAMILY MEDICINE

## 2019-08-20 PROCEDURE — 36415 COLL VENOUS BLD VENIPUNCTURE: CPT | Performed by: FAMILY MEDICINE

## 2019-08-20 RX ORDER — METRONIDAZOLE 7.5 MG/G
GEL TOPICAL 2 TIMES DAILY
Qty: 45 G | Refills: 3 | Status: SHIPPED | OUTPATIENT
Start: 2019-08-20 | End: 2019-10-28

## 2019-08-20 ASSESSMENT — PAIN SCALES - GENERAL: PAINLEVEL: NO PAIN (0)

## 2019-08-20 ASSESSMENT — MIFFLIN-ST. JEOR: SCORE: 2192.77

## 2019-08-20 NOTE — PROGRESS NOTES
"Subjective     Tone Marley is a 29 year old male who presents to clinic today for the following health issues:    HPI     Patient in for labs follow up.   Fasting for labs  Eating better, hoping to loose alexandra. Using Air frier  More active  Less stressed at new job at [employer].  Wondering about testosterone levels as well    History of CVA/ PE. Currently on chronic anticoagulation- no concerns with medications  Denies any concerns with mood, depression, anxiety.      PROBLEMS TO ADD ON...    BP Readings from Last 3 Encounters:   08/20/19 138/86   04/01/19 129/87   10/03/18 133/88    Wt Readings from Last 3 Encounters:   08/20/19 121.6 kg (268 lb)   04/01/19 124.5 kg (274 lb 6.4 oz)   10/03/18 116.4 kg (256 lb 11.2 oz)                    PROBLEMS TO ADD ON...  Reviewed and updated as needed this visit by Provider         Review of Systems   ROS COMP: Constitutional, HEENT, cardiovascular, pulmonary, GI, , musculoskeletal, neuro, skin, endocrine and psych systems are negative, except as otherwise noted.      Objective    /86 (BP Location: Left arm, Patient Position: Sitting, Cuff Size: Adult Large)   Pulse 83   Resp 14   Ht 1.803 m (5' 11\")   Wt 121.6 kg (268 lb)   SpO2 96%   BMI 37.38 kg/m    Body mass index is 37.38 kg/m .  Physical Exam   GENERAL: healthy, alert and no distress  NECK: no adenopathy, no asymmetry, masses, or scars and thyroid normal to palpation  RESP: lungs clear to auscultation - no rales, rhonchi or wheezes  CV: regular rate and rhythm, normal S1 S2, no S3 or S4, no murmur, click or rub, no peripheral edema and peripheral pulses strong  ABDOMEN: soft, nontender, no hepatosplenomegaly, no masses and bowel sounds normal  MS: no gross musculoskeletal defects noted, no edema  PSYCH: mentation appears normal, affect normal/bright    Diagnostic Test Results:  Labs reviewed in Epic  No results found for this or any previous visit (from the past 24 hour(s)).        Assessment & Plan   1. " Low testosterone in male  - Testosterone, total  - ENDOCRINOLOGY ADULT REFERRAL    2. Class 2 obesity due to excess calories without serious comorbidity with body mass index (BMI) of 37.0 to 37.9 in adult    Life style changes reiterated .  Congratulated on recent intentional weight loss    Wt Readings from Last 5 Encounters:   08/20/19 121.6 kg (268 lb)   04/01/19 124.5 kg (274 lb 6.4 oz)   10/03/18 116.4 kg (256 lb 11.2 oz)   09/04/18 112 kg (247 lb)   08/07/18 112.6 kg (248 lb 3.2 oz)       3. Mixed hyperlipidemia  - Lipid panel reflex to direct LDL Fasting  -continue with atorvastatin.  Recent Labs   Lab Test 08/20/19  0928 09/04/18  1217  08/14/13  1223 06/11/13  1053   CHOL 139 121   < > 187 193   HDL 34* 30*   < > 32* 32*   LDL 71 59   < > 113 135*   TRIG 171* 162*   < > 212* 130   CHOLHDLRATIO  --   --   --  5.9* 6.1*    < > = values in this interval not displayed.     4. Screen for STD (sexually transmitted disease)  - Chlamydia trachomatis PCR  - HIV Antigen Antibody Combo  - Neisseria gonorrhoeae PCR  - Treponema Abs w Reflex to RPR and Titer  - Hepatitis C antibody  - Hepatitis B surface antigen    5. Rosacea  - metroNIDAZOLE (METROGEL) 0.75 % external gel; Apply topically 2 times daily  Dispense: 45 g; Refill: 3    6. Benign essential hypertension-controlled on hydrochlorothiazide 25 mg once daily. Losartan 100 mg once daily   - Comprehensive metabolic panel (BMP + Alb, Alk Phos, ALT, AST, Total. Bili, TP)    7. Antiphospholipid antibody syndrome (H)  On chronic anticoagulation   8. History of CVA (cerebrovascular accident) & PE.       Regular exercise    Return in about 6 months (around 2/20/2020) for BP Recheck/ HTN.    Yara Faust MD  [facility]

## 2019-08-20 NOTE — RESULT ENCOUNTER NOTE
-Negative for sexually transmitted infections HIV, human immunodeficiency virus  and syphilis chlamydia & gonorrhea  -Glucose is high- in pre-Diabetes  Range.Please cut back on processed carbs and sugar.  Recheck in fasting glucose is recommended in 3-6 months .    -HDL(good) cholesterol level is low and your triglycerides are elevated which can increase your heart disease risk.  A diet high in fat and simple carbohydrates, genetics and being overweight can contribute to this. LDL(bad) cholesterol level is normal.  ADVISE:exercising 150 minutes of aerobic exercise per week (30 minutes 5 days per week or 50 minutes 3 days per week are options), and omega-3 fatty acids (fish oil) 9346-8792 mg daily are helpful to improve this.  In 12 months, you should recheck your fasting cholesterol panel by scheduling a lab-only appointment.

## 2019-08-20 NOTE — PATIENT INSTRUCTIONS
Wt Readings from Last 5 Encounters:   08/20/19 121.6 kg (268 lb)   04/01/19 124.5 kg (274 lb 6.4 oz)   10/03/18 116.4 kg (256 lb 11.2 oz)   09/04/18 112 kg (247 lb)   08/07/18 112.6 kg (248 lb 3.2 oz)

## 2019-08-21 ENCOUNTER — MYC MEDICAL ADVICE (OUTPATIENT)
Dept: FAMILY MEDICINE | Facility: CLINIC | Age: 29
End: 2019-08-21

## 2019-08-21 ENCOUNTER — TELEPHONE (OUTPATIENT)
Dept: FAMILY MEDICINE | Facility: CLINIC | Age: 29
End: 2019-08-21

## 2019-08-21 LAB
C TRACH DNA SPEC QL NAA+PROBE: NEGATIVE
N GONORRHOEA DNA SPEC QL NAA+PROBE: NEGATIVE
SPECIMEN SOURCE: NORMAL
SPECIMEN SOURCE: NORMAL
TESTOST SERPL-MCNC: 200 NG/DL (ref 240–950)

## 2019-08-21 NOTE — TELEPHONE ENCOUNTER
A.S,   See below  Pt requesting Testosterone result (abnormal - low)  Please advise  Thanks,  Lala GOMEZ RN

## 2019-08-21 NOTE — TELEPHONE ENCOUNTER
Reason for Call:  Request for results:    Name of test or procedure: Testosterone, total (Order #[id number]) on 8/20/19    Date of test of procedure: 8/20    Location of the test or procedure: [facility]    OK to leave the result message on voice mail or with a family member? YES    Phone number Patient can be reached at:  Home number on file [phone] (home)    Additional comments:  Please give results     Call taken on 8/21/2019 at 11:08 AM by Marco Antonio Archibald

## 2019-08-26 NOTE — TELEPHONE ENCOUNTER
RECORDS RECEIVED FROM: Cj Theodore   DATE RECEIVED: 8/26/19   NOTES (FOR ALL VISITS) STATUS DETAILS   OFFICE NOTES from referring provider Yara Lew MD   OFFICE NOTES from other specialist NA    ED NOTES NA    OPERATIVE REPORT  (thyroid, pituitary, adrenal, parathyroid) NA    MEDICATION LIST Epic 8/20/19   IMAGING      DEXASCAN     MRI (BRAIN) PACS 7/2/18 ([facility], Report in [facility])    XR (Chest)     CT (HEAD/NECK/CHEST/ABDOMEN) [facility], Report in [facility] 12/6/18 (Requested 8/26/19)     CT Chest Pulm: 5/2/17, 12/22/14  CT Chest: 1/10/15   NUCLEAR      ULTRASOUND (HEAD/NECK)     LABS     DIABETES: HBGA1C, CREATININE, FASTING LIPIDS, MICROALBUMIN URINE, POTASSIUM, TSH, T4    THYROID: TSH, T4, CBC, THYRODLONULIN, TOTAL T3, FREE T4, CALCITONIN, [facility] 8/20/19

## 2019-09-20 DIAGNOSIS — Z86.718 PERSONAL HISTORY OF DVT (DEEP VEIN THROMBOSIS): ICD-10-CM

## 2019-09-20 DIAGNOSIS — D68.61 ANTIPHOSPHOLIPID ANTIBODY SYNDROME (H): ICD-10-CM

## 2019-10-11 ENCOUNTER — OFFICE VISIT (OUTPATIENT)
Dept: HEMATOLOGY | Facility: CLINIC | Age: 29
End: 2019-10-11
Attending: PHYSICIAN ASSISTANT
Payer: COMMERCIAL

## 2019-10-11 VITALS
OXYGEN SATURATION: 97 % | WEIGHT: 270 LBS | DIASTOLIC BLOOD PRESSURE: 97 MMHG | SYSTOLIC BLOOD PRESSURE: 151 MMHG | HEART RATE: 78 BPM | RESPIRATION RATE: 12 BRPM | BODY MASS INDEX: 37.8 KG/M2 | TEMPERATURE: 98.3 F | HEIGHT: 71 IN

## 2019-10-11 DIAGNOSIS — Z86.73 HISTORY OF CVA (CEREBROVASCULAR ACCIDENT): ICD-10-CM

## 2019-10-11 DIAGNOSIS — R79.89 LOW SERUM TESTOSTERONE: ICD-10-CM

## 2019-10-11 DIAGNOSIS — D68.61 ANTIPHOSPHOLIPID ANTIBODY SYNDROME (H): Primary | ICD-10-CM

## 2019-10-11 DIAGNOSIS — Z79.01 CHRONIC ANTICOAGULATION: ICD-10-CM

## 2019-10-11 DIAGNOSIS — Z86.711 HISTORY OF PULMONARY EMBOLISM: ICD-10-CM

## 2019-10-11 PROCEDURE — 99214 OFFICE O/P EST MOD 30 MIN: CPT | Performed by: PHYSICIAN ASSISTANT

## 2019-10-11 ASSESSMENT — MIFFLIN-ST. JEOR: SCORE: 2201.84

## 2019-10-11 ASSESSMENT — PAIN SCALES - GENERAL: PAINLEVEL: NO PAIN (0)

## 2019-10-11 NOTE — PATIENT INSTRUCTIONS
1.Let the Center know when you are ready to switch to Coumadin (initially do both INR and chromogenic Factor X's - dose per chromogenic).   2. Return to clinic 6 weeks after initiating Coumadin and comparing INR with chromogenic Factor X.

## 2019-10-11 NOTE — PROGRESS NOTES
[facility]  [address] [place] [id number]  Main: [phone], Fax: [phone]    Patient seen at: [facility] at [address] [place] [address]    Outpatient Visit Note:    Patient: Tone Marley  MRN: [id number]  : 1990  CAROLINE: 2019    Reason:  History of recurrent DVT and PEs.  Antiphospholipid antibody syndrome.  Currently on chronic anticoagulation therapy with Xarelto.  Here for his routine annual follow-up visit.       Clinical History Summary:  Tone is a 29-year-old male who suffered from a massive lower extremity DVT involving the right iliac vein and large left sided pulmonary embolism back when he was 13 years of age and subsequently was found to have antiphospholipid antibody syndrome (APLS), who is currently on indefinite anticoagulation therapy with Xarelto, return to clinic today for his routine annual follow-up clinic visit He was last seen by me back in Aug 2018, please see my previous notes for his detailed clinical history.     In summary, back in , he suffered a deep vein thrombosis in the right external iliac vein with left lower lobe pulmonary embolism.  In , he again suffered another deep vein thrombosis in his left lower extremity and was found to have antiphospholipid antibody syndrome with his lupus anticoagulant being persistently positive.  He was then placed on Coumadin at that time.  During our last visit with him back in May 2009, he was not quite compliant with his Coumadin monitoring.  Eventually he was followed by the [facility] Coumadin monitoring clinic and he was doing better.     Because of his history of antiphospholipid antibody syndrome, historically his Coumadin dosing was adjusted based on factor II levels with a goal range of 15-25%.     Back a few years ago, around , he started to attend pharmacy school in [facility]..  At that time, we had him  establish care with a hematologist in [facility].     He has maintained on Coumadin from 2000 to 11/2/2013.  Then according to the patient he only had his INR monitored and was not utilizing factor II or chromogenic factor X levels for monitoring.  Then in September 2013, he apparently developed significant chest pain on the left side.  He eventually was admitted and was evaluated by Dr. Antonio Turcios (Hematologist at [facility] in [place]).  At that time, the patient was on Coumadin with his INR levels within the 2-3 range.  However according to Dr. Turcios, his factor II level was subtherapeutic in the 50s%.  CT scan was done which revealed a large left lower lobe clot burden.  Because of concern of Coumadin failure the patient was subsequently placed on enoxaparin for a few months.  During the follow-up visit with Dr. Turcios in July 2014, Dr. Porter recommended that Tone should consider switching his anticoagulation therapy to Xarelto at 20 mg p.o. daily.  Tone has maintained on this regimen since.     Tone also has since moved back to [place] back in 2015 and re-established care with this clinic.     Then in July 2018, Tone presented to [facility] ([facility] for evaluation of headaches and visual changes. For about one month prior to his presentation, he had progressively worsening headache. Then one week prior to his admission, he developed central visual abnormalities. He eventually underwent MRI of the brain the day of his 7/2/2018 admission and was found to have concern for PRES strength syndrome vs cavernous sinus. MRA evaluation subsequently showed dissection in the right vertebral artery with 50-60% stenosis. His BP was found to be 170/110 on admission as well. He was placed on Aspirin was well as Lipitor with recommendation of better BP control. Throughout his hospitalization, Xarelto was continued.     Interim History:  He saw this writer back in Aug 2018, at which  "time, this writer had continued him on anticoagulation therapy with Xarelto along with daily Aspirin. In the past year or so, he has been on this regimen and has had no further ischemic arterial event or any venous thromboembolic events.     He reports that he has been trying to loss weight but has not been successful. He is concern that he has a low testosterone level for which he has an appointment with endocrinologist later this month.     He denies any bleeding issues. Denies any frequent epistaxis. No oral mucosal bleeding. Denies any hematuria or blood in stools. No issues with shortness of breath. No Chest pain. Denies any abdominal pain. No fever. Denies any lower extremities swelling or pain.     ROS:  As above    Medication, Allergies and PmHx:  All have been reviewed by this writer in the electronic medical records.    Social History:  He is a pharmacist at [employer].    Objective:  Pleasant in no acute distress.  Vitals: BP (!) 151/97 (BP Location: Right arm, Patient Position: Sitting, Cuff Size: Adult Large)   Pulse 78   Temp 98.3  F (36.8  C) (Oral)   Resp 12   Ht 1.803 m (5' 11\")   Wt 122.5 kg (270 lb)   SpO2 97%   BMI 37.66 kg/m    Exam:  Complete exam is not performed today.     Labs:  Component      Latest Ref Rng & Units 8/20/2019   Sodium      133 - 144 mmol/L 138   Potassium      3.4 - 5.3 mmol/L 4.1   Chloride      94 - 109 mmol/L 107   Carbon Dioxide      20 - 32 mmol/L 23   Anion Gap      3 - 14 mmol/L 8   Glucose      70 - 99 mg/dL 107 (H)   Urea Nitrogen      7 - 30 mg/dL 15   Creatinine      0.66 - 1.25 mg/dL 0.85   GFR Estimate      >60 mL/min/1.73:m2 >90   GFR Estimate If Black      >60 mL/min/1.73:m2 >90   Calcium      8.5 - 10.1 mg/dL 9.3   Bilirubin Total      0.2 - 1.3 mg/dL 0.9   Albumin      3.4 - 5.0 g/dL 4.1   Protein Total      6.8 - 8.8 g/dL 8.1   Alkaline Phosphatase      40 - 150 U/L 98   ALT      0 - 70 U/L 95 (H)   AST      0 - 45 U/L 46 (H)   Testosterone Total      " 240 - 950 ng/dL 200 (L)     Assessment:  In summary, Tone is a 28 year old male with a history of recurrent lower extremity DVT and pulmonary embolism who also has Antiphospholipid Antibody Syndrome, currently on chronic anticoagulation therapy with Xarelto at 20 mg PO Qday, returns to clinic today for his routine annual follow up clinic visit. He also has a history of CVA while he is on Xarelto back in July 2018 for which it was thought to be related to his high blood pressure and vertebral artery dissection.       Diagnosis:  1. History of recurrent DVT/PE.  2. Antiphospholipid Antibody Syndrome (APLS).  3. Chronic anticoagulation therapy with Xarelto.  4. Chronic antiplatelet therapy.   5. Hypertension.   6. Ischemic stroke on 7/2/2018.     Plan:  I have a long discussion with Tone today about recent data suggesting there is an increase risk of arterial thrombosis in patients with triple antibody positive antiphospholipid antibody syndrome (APS) using Xarelto as compare to patients using Coumadin. This study was published back in Sept 2018, which was shortly after this writer saw the patient in Aug 2018. The study was an [place] study to compare effectiveness of rivaroxaban vs warfarin in patients with triple positive antibodies APS. This study was terminated early as the group of patients who was on rivaroxaban had an significant increase incidence of arterial thromboembolic event. Thus I inform the patient today that our clinic has since suggest and recommend against the use of any of the new oral anticoagulant agents in our patients with APS.     For this particular patient, there was some concern about him failing Coumadin in the past as Tone had what thought to be acute on chronic pulmonary embolic event back in 2013. In retrospec, he likely had symptoms related to his known chronic pulmonary embolic event and not that of an acute pulmonary embolic event at that time. Thus, it is unlikely that he  had failed Coumadin.     With this recent study, we strongly encourage the patient to stop Xarelto and restart Coumadin. 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 The patient indicates that he will read the study and will make his decision in a few weeks. I have instructed the patient to call our center once he has make his decision and at which time, our center will place referral for INR monitoring clinic staff to monitor his Coumadin with his chromogenic factor X level of 20-40%. I explain to Tone that about 10-15% of patients with APS could have an unreliable INR and thus we will plan to do correlation study for 2-3 months and have him return to clinic to discuss the result.     In regard to Aspirin, if he can continue to tolerate a daily Aspirin along with anticoagulation therapy, he should continue both.    He is seeing endocrinologist at the end of the month. I explain to him that currently there is no data to suggest testosterone replacement therapy would increase risk of venous thromboembolism. However, there are some data suggest that high hemoglobin or polycythemia (which could be a side effect with testosterone replacement therapy) could potentially increase risk of venous thromboembolism.     Plan Summary:  1. Continue Xarelto with Aspirin for now.   2. He is to call us once he reaches a decision of switching back to Warfarin from Xarelto.  3. Highly recommend stopping Xarelto and transition back to Warfarin.  4. Will do correlation study with chromogenic factor X levels and INR while on Warfarin for 2-3 months.  5. Then return to clinic to see me for follow up to discuss result of the correlation study.     Omaira Wills, nurse clinician is present throughout the entire clinic visit.    Total Time Spent:  33 minutes, all 33 minutes was spent on face-to-face consultation with the patient and coordination of care in regard to his history of  venous thromboembolism and anticoagulation therapy management.    Time IN: 13:13  Time OUT: 13:46      oRque Kidd PA-C, MPAS  Physician Assistant  [facility].

## 2019-10-28 ENCOUNTER — PRE VISIT (OUTPATIENT)
Dept: ENDOCRINOLOGY | Facility: CLINIC | Age: 29
End: 2019-10-28

## 2019-10-28 ENCOUNTER — OFFICE VISIT (OUTPATIENT)
Dept: ENDOCRINOLOGY | Facility: CLINIC | Age: 29
End: 2019-10-28
Payer: COMMERCIAL

## 2019-10-28 VITALS
HEART RATE: 72 BPM | HEIGHT: 72 IN | BODY MASS INDEX: 36.65 KG/M2 | SYSTOLIC BLOOD PRESSURE: 150 MMHG | DIASTOLIC BLOOD PRESSURE: 89 MMHG | WEIGHT: 270.6 LBS

## 2019-10-28 DIAGNOSIS — I10 BENIGN ESSENTIAL HYPERTENSION: ICD-10-CM

## 2019-10-28 DIAGNOSIS — E29.1 HYPOGONADISM MALE: ICD-10-CM

## 2019-10-28 DIAGNOSIS — Z79.01 CHRONIC ANTICOAGULATION: ICD-10-CM

## 2019-10-28 DIAGNOSIS — D68.61 ANTIPHOSPHOLIPID ANTIBODY SYNDROME (H): ICD-10-CM

## 2019-10-28 DIAGNOSIS — R79.89 LOW SERUM TESTOSTERONE: Primary | ICD-10-CM

## 2019-10-28 DIAGNOSIS — Z86.73 HISTORY OF CVA (CEREBROVASCULAR ACCIDENT): ICD-10-CM

## 2019-10-28 ASSESSMENT — PAIN SCALES - GENERAL: PAINLEVEL: NO PAIN (0)

## 2019-10-28 ASSESSMENT — MIFFLIN-ST. JEOR: SCORE: 2212.49

## 2019-10-28 NOTE — PROGRESS NOTES
Reason for visit/consult: Hypogonadism    Primary care provider: Yara Faust    HPI:  HPI: Tone Marley is a 29 y.o. 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 Faust for eval/mgmt of low testosterone.    Low Testosterone Total 200 (240-950) on 8/20/19 at 9:28am, 164 on 8/7/18 at 11:15 am, 124 on 3/8/18 at 7:47 am    Always had low libido (more recently within past 1-2 yrs), low energy, hardest time losing weight, puts in 2 hrs at gym 2-4 times weekly, using [employer] Pal trying to stay under 2000 calories daily, low CHO diet. No kids. Unknown fertility (uses protection) - procreation has not been attempted. Doesn't last as long as he used to (reduced erection time).     Switching from xarelto to coumadin now    Past Medical/Surgical History:  Past Medical History:   Diagnosis Date     ADHD (attention deficit hyperactivity disorder) 6/27/2012     Antiphospholipid antibody syndromes     at age 13     Antiphospholipid syndrome (H) 2/2/2012    On anticoagulation coumdin 10 mg once daily       History of pulmonary embolism 9/2/2013    Was diagnosed  Positive CT scan in emergency department   now Under care of Dr Turcios at [facility],[id number] His nurse personal line [phone] Was on lovenox for 5 months  Now  For past one month on xareltlo 20 mg once daily and plaqunil twice daily       Hyperlipidemia LDL goal <130 6/11/2013      "Hypertension goal BP (blood pressure) < 140/90 6/19/12     Hypertension, uncontrolled 6/27/2012     Prehypertension 2/2/2012     Pulmonary embolism (H)     at age 13     Past Surgical History:   Procedure Laterality Date     wisdom teeth extraction         Allergies:  Allergies   Allergen Reactions     Nkda [No Known Drug Allergies]      Current Medications   Current Outpatient Medications   Medication     ASPIRIN PO     atorvastatin (LIPITOR) 10 MG tablet     COMPRESSION STOCKINGS     hydrochlorothiazide (HYDRODIURIL) 25 MG tablet     losartan (COZAAR) 100 MG tablet     Omega-3 Fatty Acids (FISH OIL PO)     rivaroxaban ANTICOAGULANT (XARELTO ANTICOAGULANT) 20 MG TABS tablet     No current facility-administered medications for this visit.        Family History:  Family History   Problem Relation Age of Onset     Asthma Mother      Family History Negative Father      Heart Surgery Father      C.A.D. 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    Social History     Tobacco Use     Smoking status: Never Smoker     Smokeless tobacco: Former User     Types: Chew     Tobacco comment: Chew maybe once per week.   Substance Use Topics     Alcohol use: No     Alcohol/week: 0.0 standard drinks     Comment:  quit post stroke 7-2018     ROS:  negative except as mentioned in HPI and in list below    Exam  Blood pressure (!) 150/89, pulse 72, height 1.816 m (5' 11.5\"), weight 122.7 kg (270 lb 9.6 oz). Body mass index is 37.22 kg/m .  Gen: well appearing, nad, pleasant and conversant  HEENT: anicteric, EOMI, no proptosis or lid lag, no goiter  Neuro: A&Ox3, no tremor    Labs/Imaging    TSH normal in past, see HPI for testosterone labs    Assessment and Plan    Hypogonadism with low testosterone, unclear if primary or secondary - in setting of patient with coagulation disorder and CVA (dissection) in past, could be primary. Laboratory evaluation should come before pituitary imaging, in case the " latter is not needed.    Obesity, low calorie meal plan recommended along with continued exercise.    Labs: see orders for pituitary evaluation    Patient Instructions:    Please go to the lab in the early am (any [place])    1,700 calorie meal plan to lose lbs weekly without exercise (based on REE calc of  2,450)  Use meal replacements such as Kristan's meals, Lean Cuisines, Healthy Choice, Smart Ones, Weight Watchers Meals, and Slim Fast and Glucerna shakes and supplement with fresh fruits and vegetables  Please drink a lot of water daily. Most people typically need about 2 liters of water daily and more if they are exercising, have a large weight, or have a fever or illness. Add Crystal Light for flavoring if desired. But no pop with calories in it.  Please keep a food journal of what you eat, calories in what you eat  Consider using applications for smart phones such as Splash Technology, Lenovo, 1Mind, LoseIt, Tap&Track, and RelaxM.  Focus on wet volumetrics, meaning, eat more foods that are high in water and fiber such as fruits and vegetables in order to get that full feeling. These are also good for your overall health as well.  Check out Dr. Jadyn Spring from [facility] - she has cookbooks with low calorie volumetric recipes  You can try Let's Dish to help you prepare meals for you and your family. Often times, the caloric and nutrition data and serving sizes are available for this food. This can be a time saving maneuver. The website can give you more information http://www.Frest Marketing.Arcadia EcoEnergies/  Rolan's Delivers has Let's Dish & fresh low calorie salads  Check out Hello Fresh at https://www.WaveSyndicate/food-boxes/classic-box/  Try Cooking Light recipes for low calorie meal preparation and planning  Other food plan options you can search for on the internet and check out include: Halima DAVID, [facility]    Progressively increase physical activity to 60 minutes, including combination of cardio & resistance  training x 5 days weekly.    Please consider health psychologist to discuss how mood, depression and/or anxiety impact your eating.  Psychological Providers    Check with your insurance to see if the psychologist is covered, if not, ask your insurance company for a referral.    [facility]   Reyes Nicholas, PhD, LP   [phone]  Lisset Hurley, PhD, LP  [phone]  Betty Morton, PhD                 [phone]    Kumar Jimenes, Sarthaky,LP             [phone]    [place]  Misty Cortez, PhD, LP  [phone]      [facility] Service:           [phone]  We send a referral and patient is notified.    [place]  [facility]:  [phone]  Sarthak HughesyD, [facility]  Celsa Limon PSyD, LP         [phone]    [facility]   To schedule, please call member services on the back of your card.    JEN Mcgee PsyD,LP,ABPP [phone]    [place]  Jose Angel Bautista MA, [phone]    [place]  Karime Gambino PsyD, [phone]    [place]  Sarthak RecinosyD, [phone]  (fibromyalgia issues)    NORRIS Lindquist  [phone]    [place]  Luz Park PsyD,LP  [phone]  Jasvir Mustafa, PhD, LP  [phone]  Jasvir Bartlett, PhD, LP             [phone]    [place]  Mitch Murry , [facility], LMFT [phone]    [place]  Sarthak VanceyD, [phone]    NORRIS Fisher PsyD, LP   [phone]     [place]      Outreach [phone]   Jenise Singletary MA, LP  - ext. [phone]  Jasvir Méndez, PhD, LP - ext [phone]  Mike Mccurdy, Nisha, LP - ext [phone]    [place]  Rickey Reyes, PhD, [phone]  Guido Yee PsyD, [phone]          St. Isrrael Nguyen, PhD, [phone]    St. Mac Wilcox PsyD, LP  [phone]            Call updates to CAMI Salamanca    [phone]  Last updated:  2019      RTC: 4 months    Starla Zuleta MD, MPH  Attending Physician  Diabetes/Endocrinology/Metabolism    Time: 60 min spent on evaluation, management, counseling, education, & motivational interviewing with greater than 50% of the total time was spent on counseling and coordinating care    Addendum: labs  Called patient back to discuss labs & discuss plan.    SHB (11-80)  Total Testosterone:  259 (240-950)  Free Testosterone: 7.88 (4.7-24.4)    Results for ABILIO MAYES (MRN [id number]) as of 2019 13:56   Ref. Range 10/30/2019 07:40   Free Testosterone Calculated Latest Ref Range: 4.7 - 24.4 ng/dL 7.88       ENDO PITUITARY LABS-[facility] Latest Ref Rng & Units 10/30/2019   TSH 0.40 - 4.00 mU/L 4.44 (H)   PROLACTIN 2 - 18 ug/L 25 (H)   CORTISOL, SERUM 4 - 22 ug/dL 20.0   ADRENAL CORTICOTROPIN <47 pg/mL 48 (H)   FSH 0.7 - 10.8 IU/L 2.8   LUTROPIN 1.5 - 9.3 IU/L 2.7   TESTOSTERONE TOTAL 240 - 950 ng/dL 259   PSA 0 - 4 ug/L 0.59    - 144 mmol/L    POTASSIUM 3.4 - 5.3 mmol/L    HUMAN GROWTH HORMONE <1.3 ug/L <0.1   GLUCOSE 70 - 99 mg/dL      ENDO THYROID LABS-[facility] Latest Ref Rng & Units 10/30/2019   TSH 0.40 - 4.00 mU/L 4.44 (H)   T4 FREE 0.76 - 1.46 ng/dL 1.03     ENDO ADRENAL LABS Latest Ref Rng & Units 10/30/2019   CORTISOL, SERUM 4 - 22 ug/dL 20.0   ADRENAL CORTICOTROPIN <47 pg/mL 48 (H)   TESTOSTERONE TOTAL 240 - 950 ng/dL 259   POTASSIUM 3.4 - 5.3 mmol/L     - 144 mmol/L    CREATININE URINE mg/dL      Plan: repeat labs in about 1 month: IGF-1, HGH, LH, FSH, Free and Total Testosterone, Prolactin, macroprolactin.  If still abnormal, pituitary MRI followed by Testosterone replacement. I did explain to him that he appears to have Hypogonadotrophic Hypogonadism and that the slightly elevated prolactin is non-specific. His thyroid and adrenal axes are intact. With his vascular history (anti-phospholipid joel syndrome and CVAs), it is possible that the pituitary was infarcted and he  could have partial hypopituitarism.    Addendum:  Testosterone remains low and gonadotrophs are inappropriately normal/low. Pituitary MRI should be done if no contraindication.      Answers for HPI/ROS submitted by the patient on 10/15/2019   General Symptoms: No  Skin Symptoms: No  HENT Symptoms: No  EYE SYMPTOMS: No  HEART SYMPTOMS: No  LUNG SYMPTOMS: No  INTESTINAL SYMPTOMS: No  URINARY SYMPTOMS: No  REPRODUCTIVE SYMPTOMS: No  SKELETAL SYMPTOMS: No  BLOOD SYMPTOMS: No  NERVOUS SYSTEM SYMPTOMS: No  MENTAL HEALTH SYMPTOMS: No

## 2019-10-28 NOTE — LETTER
10/28/2019       RE: Tone Marley  [address] [place] [facility] [place] [address]     Dear Colleague,    Thank you for referring your patient, Tone Marley, to the [facility] at [facility]. Please see a copy of my visit note below.    Reason for visit/consult: Hypogonadism    Primary care provider: Yara Faust    HPI:  HPI: Tone Marley is a 29 y.o. 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 Faust for eval/mgmt of low testosterone.    Low Testosterone Total 200 (240-950) on 8/20/19 at 9:28am, 164 on 8/7/18 at 11:15 am, 124 on 3/8/18 at 7:47 am    Always had low libido (more recently within past 1-2 yrs), low energy, hardest time losing weight, puts in 2 hrs at gym 2-4 times weekly, using [employer] Pal trying to stay under 2000 calories daily, low CHO diet. No kids. Unknown fertility (uses protection) - procreation has not been attempted. Doesn't last as long as he used to (reduced erection time).     Switching from xarelto to coumadin now    Past Medical/Surgical History:  Past Medical History:   Diagnosis Date     ADHD (attention deficit hyperactivity disorder) 6/27/2012     Antiphospholipid antibody syndromes     at age 13     Antiphospholipid syndrome (H) 2/2/2012    On anticoagulation coumdin 10 mg once daily       History of pulmonary embolism 9/2/2013    Was diagnosed  Positive  "CT scan in emergency department   now Under care of [facility] Turcios at [facility],[id number] His nurse personal line [phone] Was on lovenox for 5 months  Now  For past one month on xareltlo 20 mg once daily and plaqunil twice daily       Hyperlipidemia LDL goal <130 6/11/2013     Hypertension goal BP (blood pressure) < 140/90 6/19/12     Hypertension, uncontrolled 6/27/2012     Prehypertension 2/2/2012     Pulmonary embolism (H)     at age 13     Past Surgical History:   Procedure Laterality Date     wisdom teeth extraction         Allergies:  Allergies   Allergen Reactions     Nkda [No Known Drug Allergies]      Current Medications   Current Outpatient Medications   Medication     ASPIRIN PO     atorvastatin (LIPITOR) 10 MG tablet     COMPRESSION STOCKINGS     hydrochlorothiazide (HYDRODIURIL) 25 MG tablet     losartan (COZAAR) 100 MG tablet     Omega-3 Fatty Acids (FISH OIL PO)     rivaroxaban ANTICOAGULANT (XARELTO ANTICOAGULANT) 20 MG TABS tablet     No current facility-administered medications for this visit.        Family History:  Family History   Problem Relation Age of Onset     Asthma Mother      Family History Negative Father      Heart Surgery Father      C.A.D. 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    Social History     Tobacco Use     Smoking status: Never Smoker     Smokeless tobacco: Former User     Types: Chew     Tobacco comment: Chew maybe once per week.   Substance Use Topics     Alcohol use: No     Alcohol/week: 0.0 standard drinks     Comment:  quit post stroke 7-2018     ROS:  negative except as mentioned in HPI and in list below    Exam  Blood pressure (!) 150/89, pulse 72, height 1.816 m (5' 11.5\"), weight 122.7 kg (270 lb 9.6 oz). Body mass index is 37.22 kg/m .  Gen: well appearing, nad, pleasant and conversant  HEENT: anicteric, EOMI, no proptosis or lid lag, no goiter  Neuro: A&Ox3, no tremor    Labs/Imaging    TSH normal in past, see " HPI for testosterone labs    Assessment and Plan    Hypogonadism with low testosterone, unclear if primary or secondary - in setting of patient with coagulation disorder and CVA (dissection) in past, could be primary. Laboratory evaluation should come before pituitary imaging, in case the latter is not needed.    Obesity, low calorie meal plan recommended along with continued exercise.    Labs: see orders for pituitary evaluation    Patient Instructions:    Please go to the lab in the early am (any [place])    1,700 calorie meal plan to lose lbs weekly without exercise (based on REE calc of  2,450)  Use meal replacements such as Kristan's meals, Lean Cuisines, Healthy Choice, Smart Ones, Weight Watchers Meals, and Slim Fast and Glucerna shakes and supplement with fresh fruits and vegetables  Please drink a lot of water daily. Most people typically need about 2 liters of water daily and more if they are exercising, have a large weight, or have a fever or illness. Add Crystal Light for flavoring if desired. But no pop with calories in it.  Please keep a food journal of what you eat, calories in what you eat  Consider using applications for smart phones such as Truly Accomplished, Kindred Biosciences, microDimensionsReciVenturepax, LoseIt, Tap&Track, and RelaxM.  Focus on wet volumetrics, meaning, eat more foods that are high in water and fiber such as fruits and vegetables in order to get that full feeling. These are also good for your overall health as well.  Check out Dr. Jadyn Spring from [facility] - she has cookbooks with low calorie volumetric recipes  You can try Let's Dish to help you prepare meals for you and your family. Often times, the caloric and nutrition data and serving sizes are available for this food. This can be a time saving maneuver. The website can give you more information http://www.Social Game Universe.Mobile Action/  Rolan's Delivers has Let's Dish & fresh low calorie salads  Check out Hello Fresh at  https://www.Vicarious/food-boxes/classic-box/  Try Cooking Light recipes for low calorie meal preparation and planning  Other food plan options you can search for on the internet and check out include: Halima DAVID, [facility]    Progressively increase physical activity to 60 minutes, including combination of cardio & resistance training x 5 days weekly.    Please consider health psychologist to discuss how mood, depression and/or anxiety impact your eating.  Psychological Providers    Check with your insurance to see if the psychologist is covered, if not, ask your insurance company for a referral.    [facility]   Reyes Nicholas, PhD, LP   [phone]  Lisset Hurley, PhD, LP  [phone]  Betty Morton, PhD                 [phone]    Kumar Jimenes, Sarthaky,LP             [phone]    [place]  Misty Cortez, PhD, LP  [phone]      [facility] Service:           [phone]  We send a referral and patient is notified.    [place]  [facility]:  [phone]  Sarthak HughesyD, [facility]  Celsa Limon, PSyD, LP         [phone]    [facility]   To schedule, please call member services on the back of your card.    JEN Mcgee, SarthakyD,LP,ABPP [phone]    [facility] Michele[facility], [phone]    [place]  Karime Gambino PsyD, [phone]    [place]  Joya Cueva PsyD, [phone]  (fibromyalgia issues)    NORRIS Lindquist  [phone]    [place]  Luz Park,PsyD,LP  [phone]  Jasvir Mustafa, PhD, LP  [phone]  Jasvir Bartlett, PhD, LP             [phone]    [place]  Mitch Murry , [facility], LMFT [phone]    [place]  Yuni Wilson, PsyD, [phone]    NORRIS Fisher, PsyD, LP   [phone]     [facility] [phone]   Jenise Singletary MA, LP  - ext. [phone]  Jasvir  Honey, PhD, LP - ext [phone]  Mike Mccurdy, Nisha, LP - ext [phone]    [place]  Rickey Reyes, PhD, [phone]  Guido Yee PsyD, [phone]          St. Isrrael Nguyen, PhD, [phone]    St. Mac Wilcox PsyD, LP  [phone]            Call updates to CAMI Salamanca    [phone]  Last updated: 08/16/2019      RTC: 4 months    Starla Zuleta MD, MPH  Attending Physician  Diabetes/Endocrinology/Metabolism    Time: 60 min spent on evaluation, management, counseling, education, & motivational interviewing with greater than 50% of the total time was spent on counseling and coordinating care

## 2019-10-28 NOTE — PATIENT INSTRUCTIONS
Please go to the lab in the early am (any [place])    1,700 calorie meal plan to lose lbs weekly without exercise (based on REE calc of  2,450)  Use meal replacements such as Kristan's meals, Lean Cuisines, Healthy Choice, Smart Ones, Weight Watchers Meals, and Slim Fast and Glucerna shakes and supplement with fresh fruits and vegetables  Please drink a lot of water daily. Most people typically need about 2 liters of water daily and more if they are exercising, have a large weight, or have a fever or illness. Add Crystal Light for flavoring if desired. But no pop with calories in it.  Please keep a food journal of what you eat, calories in what you eat  Consider using applications for smart phones such as Oncolytics Biotech, Sandglaz, Evident.io, LoseIt, Tap&Track, and RelaxM.  Focus on wet volumetrics, meaning, eat more foods that are high in water and fiber such as fruits and vegetables in order to get that full feeling. These are also good for your overall health as well.  Check out Dr. Jadyn Spring from [facility] - she has cookbooks with low calorie volumetric recipes  You can try Let's Dish to help you prepare meals for you and your family. Often times, the caloric and nutrition data and serving sizes are available for this food. This can be a time saving maneuver. The website can give you more information http://www.Baytex.Proteopure/  Coborn's Delivers has Let's Dish & fresh low calorie salads  Check out Hello Fresh at https://www.StartupMojo/food-boxes/classic-box/  Try Cooking Light recipes for low calorie meal preparation and planning  Other food plan options you can search for on the internet and check out include: Halima DAVID, [facility]    Progressively increase physical activity to 60 minutes, including combination of cardio & resistance training x 5 days weekly.    Please consider health psychologist to discuss how mood, depression and/or anxiety impact your eating.  Psychological Providers    Check with  your insurance to see if the psychologist is covered, if not, ask your insurance company for a referral.    [facility]   Reyes Nicholas, PhD, LP   [phone]  Lisset Hurley, PhD, LP  [phone]  Betty Morton, PhD                 [phone]    Kumar Jimenes, Psy,LP             [phone]    [place]  Misty Cortez, PhD, LP  [phone]      [facility] Service:           [phone]  We send a referral and patient is notified.    [place]  [facility]:  [phone]  Kristan Woods, PsyD, [facility]  Celsa Limon, PSyD, LP         [phone]    [facility]   To schedule, please call member services on the back of your card.    JEN Mcgee PsyD,LP,ABPP [phone]    [place]  Jose Angel Bautista MA, [phone]    [place]  Karime Gambino PsyD, [phone]    [place]  Joya Cueva, PsyD, [phone]  (fibromyalgia issues)    NORRIS Lindquist  [phone]    [place]  Luz Park,PsyD,LP  [phone]  Jasvir Mustafa, PhD, LP  [phone]  Jasvir Bartlett, PhD, LP             [phone]    [place]  Mitch Murry , [facility], [phone]    [place]  Yuni Wilson, PsyD, [phone]    [place]  Dona Torres, PsyD, LP   [phone]     [place]      Outreach [phone]   Jenise Singletary MA, LP  - ext. [phone]  Jasvir Méndez, PhD, LP - ext [phone]  Mike Mccurdy, SarthakyD, LP - ext [phone]    [place]  Rickey Reyes, PhD, [phone]  Guido Yee, PsyD, [phone]          [place] Nugyen, PhD, [phone]    St. Mac Wilcox PsyD, LP  [phone]            Call updates to CAMI Salamanca    [phone]  Last updated: 2019

## 2019-10-30 DIAGNOSIS — D68.61 ANTIPHOSPHOLIPID ANTIBODY SYNDROME (H): ICD-10-CM

## 2019-10-30 DIAGNOSIS — I10 BENIGN ESSENTIAL HYPERTENSION: ICD-10-CM

## 2019-10-30 DIAGNOSIS — R79.89 LOW SERUM TESTOSTERONE: ICD-10-CM

## 2019-10-30 DIAGNOSIS — Z86.73 HISTORY OF CVA (CEREBROVASCULAR ACCIDENT): ICD-10-CM

## 2019-10-30 DIAGNOSIS — E29.1 HYPOGONADISM MALE: ICD-10-CM

## 2019-10-30 DIAGNOSIS — Z79.01 CHRONIC ANTICOAGULATION: ICD-10-CM

## 2019-10-30 LAB
CORTIS SERPL-MCNC: 20 UG/DL (ref 4–22)
FSH SERPL-ACNC: 2.8 IU/L (ref 0.7–10.8)
HGB BLD-MCNC: 15.2 G/DL (ref 13.3–17.7)
LH SERPL-ACNC: 2.7 IU/L (ref 1.5–9.3)
PROLACTIN SERPL-MCNC: 25 UG/L (ref 2–18)
PSA SERPL-MCNC: 0.59 UG/L (ref 0–4)
T4 FREE SERPL-MCNC: 1.03 NG/DL (ref 0.76–1.46)
TSH SERPL DL<=0.005 MIU/L-ACNC: 4.44 MU/L (ref 0.4–4)

## 2019-10-30 PROCEDURE — 84153 ASSAY OF PSA TOTAL: CPT | Performed by: INTERNAL MEDICINE

## 2019-10-30 PROCEDURE — 84270 ASSAY OF SEX HORMONE GLOBUL: CPT | Performed by: INTERNAL MEDICINE

## 2019-10-30 PROCEDURE — 83002 ASSAY OF GONADOTROPIN (LH): CPT | Performed by: INTERNAL MEDICINE

## 2019-10-30 PROCEDURE — 36415 COLL VENOUS BLD VENIPUNCTURE: CPT | Performed by: INTERNAL MEDICINE

## 2019-10-30 PROCEDURE — 84439 ASSAY OF FREE THYROXINE: CPT | Performed by: INTERNAL MEDICINE

## 2019-10-30 PROCEDURE — 85018 HEMOGLOBIN: CPT | Performed by: INTERNAL MEDICINE

## 2019-10-30 PROCEDURE — 84146 ASSAY OF PROLACTIN: CPT | Performed by: INTERNAL MEDICINE

## 2019-10-30 PROCEDURE — 82533 TOTAL CORTISOL: CPT | Performed by: INTERNAL MEDICINE

## 2019-10-30 PROCEDURE — 83003 ASSAY GROWTH HORMONE (HGH): CPT | Performed by: INTERNAL MEDICINE

## 2019-10-30 PROCEDURE — 84443 ASSAY THYROID STIM HORMONE: CPT | Performed by: INTERNAL MEDICINE

## 2019-10-30 PROCEDURE — 84403 ASSAY OF TOTAL TESTOSTERONE: CPT | Performed by: INTERNAL MEDICINE

## 2019-10-30 PROCEDURE — 82024 ASSAY OF ACTH: CPT | Performed by: INTERNAL MEDICINE

## 2019-10-30 PROCEDURE — 83001 ASSAY OF GONADOTROPIN (FSH): CPT | Performed by: INTERNAL MEDICINE

## 2019-10-31 LAB — GH SERPL-MCNC: <0.1 UG/L

## 2019-11-01 LAB
SHBG SERPL-SCNC: 12 NMOL/L (ref 11–80)
TESTOST FREE SERPL-MCNC: 7.88 NG/DL (ref 4.7–24.4)
TESTOST SERPL-MCNC: 259 NG/DL (ref 240–950)

## 2019-11-04 LAB — ACTH PLAS-MCNC: 48 PG/ML

## 2019-11-06 ENCOUNTER — TELEPHONE (OUTPATIENT)
Dept: ENDOCRINOLOGY | Facility: CLINIC | Age: 29
End: 2019-11-06

## 2019-11-06 NOTE — TELEPHONE ENCOUNTER
Spoke w/ Pt: states need not urgent, Lab results WNL, asking for follow up from Dr Zuleta regarding lab results and Rx recommendations. Sent to provider for review upon return.   Aracely Bullard RN on 11/6/2019 at 4:14 PM

## 2019-11-06 NOTE — TELEPHONE ENCOUNTER
[facility] Call Center    Phone Message    May a detailed message be left on voicemail: yes    Reason for Call: Other: PT is following up on the status of a testosterone RX?  Please follow up with the PT.      Action Taken: Message routed to:  [facility] efren

## 2019-11-09 NOTE — TELEPHONE ENCOUNTER
[facility] Call Center    Phone Message    May a detailed message be left on voicemail: yes    Reason for Call: Other: Patient was calling again for Labs results and RX recommendations for testosterone.  Please follow up with patient.  Thank you!     Action Taken: Message routed to:  [facility].

## 2019-11-11 NOTE — TELEPHONE ENCOUNTER
Labs done 10/30/19 which we discussed with him but he wants Dr Zuleta's view on them. I  Don't have Dr Zuleta out on Tuesday 11/12/ so will route this to her again. Tone does not want another providers view . Please  put together a results  letter  For Tone and note any dose changes needed. Rachel Leach RN on 11/11/2019 at 4:35 PM

## 2019-11-12 ENCOUNTER — TELEPHONE (OUTPATIENT)
Dept: FAMILY MEDICINE | Facility: CLINIC | Age: 29
End: 2019-11-12

## 2019-11-12 ENCOUNTER — TELEPHONE (OUTPATIENT)
Dept: HEMATOLOGY | Facility: CLINIC | Age: 29
End: 2019-11-12

## 2019-11-12 DIAGNOSIS — D68.61 ANTIPHOSPHOLIPID ANTIBODY SYNDROME (H): ICD-10-CM

## 2019-11-12 DIAGNOSIS — Z86.73 HISTORY OF CVA (CEREBROVASCULAR ACCIDENT): ICD-10-CM

## 2019-11-12 DIAGNOSIS — Z86.711 HISTORY OF PULMONARY EMBOLISM: ICD-10-CM

## 2019-11-12 DIAGNOSIS — Z79.01 CHRONIC ANTICOAGULATION: ICD-10-CM

## 2019-11-12 DIAGNOSIS — D68.61 ANTIPHOSPHOLIPID ANTIBODY SYNDROME (H): Primary | ICD-10-CM

## 2019-11-12 LAB — INR PPP: 1.22 (ref 0.86–1.14)

## 2019-11-12 PROCEDURE — 85610 PROTHROMBIN TIME: CPT | Performed by: FAMILY MEDICINE

## 2019-11-12 PROCEDURE — 36415 COLL VENOUS BLD VENIPUNCTURE: CPT | Performed by: FAMILY MEDICINE

## 2019-11-12 PROCEDURE — 85260 CLOT FACTOR X STUART-POWER: CPT | Performed by: FAMILY MEDICINE

## 2019-11-12 RX ORDER — WARFARIN SODIUM 5 MG/1
5 TABLET ORAL DAILY
Qty: 30 TABLET | Refills: 3 | Status: SHIPPED | OUTPATIENT
Start: 2019-11-12 | End: 2019-11-25

## 2019-11-12 NOTE — TELEPHONE ENCOUNTER
ACC RN received call from Roque Kidd PA-C, he is transitioning patient from Xarelto back to warfarin due to recent study of APS patients showing higher incident of arterial thrombosis. 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 Roque started him on warfarin 5mg daily with an enoxaparin bridge of 1mg/kg every 12hours starting today. He wants him monitored with Chromogenic Factor X and INR for 2-3 months, but managed by the Chromogenic factor X result. During his time he will be followed by the [facility] then if he is able to continue with INR monitoring he will be seen face to face in [place] if that is his preference.  Left message for patient to return call to discuss and schedule lab appts.  Keyanna Montanez RN  Anticoagulation Clinic

## 2019-11-12 NOTE — TELEPHONE ENCOUNTER
[facility]  [facility]  [address] [place] [address] [address], [place] [address]  Main: [phone], Fax: [phone]    Telephone Note:    Patient: Tone Marley  MRN: [id number]  : 1990  Date of this note written: 2019    Brief History:  29 year old with APS and history of recurrent DVT and PE who also has a history of CVA currently on Xarelto and would like to transition back to Warfarin after recent study showing that patients with triple positive APS has an increase risk of arterial thrombosis while using new oral anticoagulant agents (NOACs) as their mainstay of anticoagulation therapy.     This writer saw Tone back in 10/11/2019, please refer to this writer's note back on 10/11/2019 for details.     Received a refill request from [facility] in regard to Xarelto. This writer called the patient on 2019 at around 13:30 to discuss about transitioning back to Warfarin. 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     Anticoagulation transition plan from Xarelto to Warfarin is as follow:  1. Stop Xarelto as of today (patient usually takes Xarelto in the evening.)  2. Start Enoxaparin at 1 mg/kg SubQ Q 12 hours (120 mg) tonight along with Warfarin at 5 mg PO Qday dosing.   3. Obtain both Chromogenic factor X level and INR for 2-3 months for comparison study as the patient has APS.  4. Use chromogenic factor X at this time during the comparison study period to adjust his Warfarin dosing. Chromogenic factor X therapeutic level should be at 20-40%.   5. Return to clinic to see me in 3 months to discuss comparison study result.     Rx of Enoxaparin and Warfarin [place] [facility] on [address] [place].   INR clinic referral is made in Epic.  This writer also spoke with [facility] monitoring clinic staff in regard to the above plan. They will  reach out to the patient in regard to timing and logistics about lab draws.       Roque Kidd PA-C, MPAS  Physician Assistant  [facility].

## 2019-11-12 NOTE — TELEPHONE ENCOUNTER
ANTICOAGULATION MANAGEMENT     Patient Name:  Tone Marley  Date:  11/12/2019    ASSESSMENT /SUBJECTIVE      Additional findings: Patient starting warfarin today, transition from Xarelto. Per Roque Kidd PA-C. He will begin at 5mg daily with Enoxaparin 120mg ecery 12 hours. He will be checking INR and Chromogenic Factor X but will be dosed based off his Factor X results per Roque. Roque wants this comparison to continue for 3 months per order.      PLAN:    Spoke with Tone regarding the plan.     Warfarin Dosing Instructions: 5mg Tues and Wednesday, Enoxaparin 120mg every 12 hours    Instructed patient to follow up no later than: 11/14/19    Education provided: Yes: Discuss the plan for anticoagulation management via phone through the [facility] and also the requirements for a Home Monitor in the future. 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    Instructed to call the Anticoagulation Clinic for any changes, questions or concerns. (#[phone])        OBJECTIVE:  INR   Date Value Ref Range Status   01/11/2015 1.08 0.86 - 1.14 Final     Factor 2 Assay   Date Value Ref Range Status   02/13/2012 36 (L) 60 - 140 % Final             Anticoagulation Summary  As of 11/12/2019    INR goal:   2.0-3.0   TTR:   --   INR used for dosing:   No new INR was available at the time of this encounter.   Warfarin maintenance plan:   5 mg (5 mg x 1) every day   Full warfarin instructions:   11/12: 5 mg; 11/13: 5 mg; Otherwise 5 mg every day   Weekly warfarin total:   35 mg   Plan last modified:   Keyanna Méndez RN (11/12/2019)   Next INR check:   11/14/2019   Priority:   C Factor 10   Target end date:   Indefinite    Indications    Antiphospholipid antibody syndrome (H) [D68.61]  Chronic anticoagulation [Z79.01]             Anticoagulation Episode Summary     INR check location:       Preferred lab:        Send INR reminders to:   LOUIS RIZZO    Comments:   Chromogenic Factor X and INR monitoring for 3 months per Roque Kidd PA-C * transition from Xarelto bridging with enoxaparin 1mg/kg every 12 hours started 11/12/19

## 2019-11-13 LAB — FACT X ACT/NOR PPP CHRO: 105 % (ref 70–130)

## 2019-11-14 ENCOUNTER — TELEPHONE (OUTPATIENT)
Dept: FAMILY MEDICINE | Facility: CLINIC | Age: 29
End: 2019-11-14

## 2019-11-14 DIAGNOSIS — Z79.01 CHRONIC ANTICOAGULATION: ICD-10-CM

## 2019-11-14 DIAGNOSIS — D68.61 ANTIPHOSPHOLIPID ANTIBODY SYNDROME (H): ICD-10-CM

## 2019-11-14 DIAGNOSIS — Z86.711 HISTORY OF PULMONARY EMBOLISM: Primary | ICD-10-CM

## 2019-11-14 DIAGNOSIS — I26.99 PULMONARY EMBOLISM (H): ICD-10-CM

## 2019-11-14 LAB — INR PPP: 1.13 (ref 0.86–1.14)

## 2019-11-14 PROCEDURE — 85610 PROTHROMBIN TIME: CPT | Performed by: FAMILY MEDICINE

## 2019-11-14 PROCEDURE — 36415 COLL VENOUS BLD VENIPUNCTURE: CPT | Performed by: FAMILY MEDICINE

## 2019-11-14 PROCEDURE — 85260 CLOT FACTOR X STUART-POWER: CPT | Performed by: FAMILY MEDICINE

## 2019-11-15 LAB — FACT X ACT/NOR PPP CHRO: 117 % (ref 70–130)

## 2019-11-15 NOTE — TELEPHONE ENCOUNTER
Waiting for factor 10 result.  Advise patient take 7.5 mg tonight and we will call with further dosing tomorrow when factor 10 result comes in.  Fiordaliza Hatch RN  Anticoagulation Nurse - [facility] [place]

## 2019-11-15 NOTE — TELEPHONE ENCOUNTER
ANTICOAGULATION MANAGEMENT     Patient Name:  Tone Marley  Date:  11/15/2019    ASSESSMENT /SUBJECTIVE:      Today's INR result of 1.13; factor 10 = 117% and is subtherapeutic. Goal INR of 2.0-3.0; 20-40% factor 10      Warfarin dose taken: Warfarin taken as previously instructed    Diet: No new diet changes affecting INR    Medication changes/ interactions: No new medications/supplements affecting INR    Previous INR: Subtherapeutic     S/S of bleeding or thromboembolism: No    New injury or illness:  No    Upcoming surgery, procedure or cardioversion:  No    Additional findings: Changed from Xarelto to Warfarin. Bridging with Lovenox.      PLAN:    Left detailed message for Tone regarding INR result and instructed:     Warfarin Dosing Instructions: Change your warfarin dose to 7.5 mg daily    Instructed patient to follow up no later than: 4 days    Education provided: Yes; Warfarin risks; Factor 10 relation to Coumadin (Patient pharmacist by [place]).    Instructed to call the Anticoagulation Clinic for any changes, questions or concerns. (#[phone])        OBJECTIVE:  INR   Date Value Ref Range Status   2019 1.13 0.86 - 1.14 Final     Chromogenic Factor 10   Date Value Ref Range Status   2019 117 70 - 130 % Final     Comment:     Therapeutic Range:  A Chromogenic Factor 10 level of approximately 20-40%   inversely correlates with an INR of 2-3 for patients receiving Warfarin.   Chromogenic Factor 10 levels below 20% indicate an INR greater than 3 and   levels above 40% indicate an INR less than 2.       Factor 2 Assay   Date Value Ref Range Status   2012 36 (L) 60 - 140 % Final             Anticoagulation Summary  As of 2019    INR goal:   2.0-3.0   TTR:   --   INR used for dosin.13! (2019)   Warfarin maintenance plan:   7.5 mg (5 mg x 1.5) every day   Full warfarin instructions:   7.5 mg every day   Weekly warfarin total:   52.5 mg   Plan last modified:   Holden  Fiordaliza MCDONALD RN (11/15/2019)   Next INR check:   11/18/2019   Priority:   Critical   Target end date:   Indefinite    Indications    Antiphospholipid antibody syndrome (H) [D68.61]  Chronic anticoagulation [Z79.01]             Anticoagulation Episode Summary     INR check location:       Preferred lab:       Send INR reminders to:   LOUIS RIZZO    Comments:   Chromogenic Factor X and INR monitoring for 3 months per Roque Kidd PA-C * transition from Xarelto bridging with enoxaparin 11/12/19; Patient gives permission to leave a detailed message.

## 2019-11-18 ENCOUNTER — TELEPHONE (OUTPATIENT)
Dept: FAMILY MEDICINE | Facility: CLINIC | Age: 29
End: 2019-11-18

## 2019-11-18 DIAGNOSIS — D68.61 ANTIPHOSPHOLIPID ANTIBODY SYNDROME (H): ICD-10-CM

## 2019-11-18 DIAGNOSIS — Z86.711 HISTORY OF PULMONARY EMBOLISM: ICD-10-CM

## 2019-11-18 DIAGNOSIS — Z79.01 CHRONIC ANTICOAGULATION: ICD-10-CM

## 2019-11-18 LAB
FACT X ACT/NOR PPP CHRO: 89 % (ref 70–130)
INR PPP: 1.23 (ref 0.86–1.14)

## 2019-11-18 PROCEDURE — 85610 PROTHROMBIN TIME: CPT | Performed by: FAMILY MEDICINE

## 2019-11-18 PROCEDURE — 85260 CLOT FACTOR X STUART-POWER: CPT | Performed by: FAMILY MEDICINE

## 2019-11-18 PROCEDURE — 36415 COLL VENOUS BLD VENIPUNCTURE: CPT | Performed by: FAMILY MEDICINE

## 2019-11-18 NOTE — TELEPHONE ENCOUNTER
ANTICOAGULATION MANAGEMENT     Patient Name:  Tone Marley  Date:  11/18/2019    ASSESSMENT /SUBJECTIVE:      Today's INR result of 1.23 is subtherapeutic. Goal INR of 2.0-3.0  Chromogenic Factor X 89% goal 20%-40%    Warfarin dose taken: Warfarin taken as previously instructed    Diet: No new diet changes affecting INR    Medication changes/ interactions: No new medications/supplements affecting INR    Previous INR: Subtherapeutic     S/S of bleeding or thromboembolism: No    New injury or illness:  No    Upcoming surgery, procedure or cardioversion:  No    Additional findings: Patient was on a 70mg weekly warfarin dose previously when he was on warfarin he stayed therapeutic on this dose. Per protocol he can [id number]% increase this time, his previous dose would put him at a 33.3% increase.      PLAN:    Spoke with Tone regarding INR result and instructed:     Warfarin Dosing Instructions: Take 12.5mg today and tomorrow and change your warfarin dose to 10mg daily continue the enoxaparin injections every 12 hours as instructed    Instructed patient to follow up no later than: 11/21/19    Education provided: Yes: Discussed remaining consistent on his diet plan. Pt is on a weight loss journey and regularly eats Vit K foods consistently as well as takes a Multi Vitamin. Advised him to remain consistent with this and for any changes make certain he reports it to the ACC RN.      Tone verbalizes understanding and agrees to warfarin dosing plan.    Instructed to call the Anticoagulation Clinic for any changes, questions or concerns. (#[phone])        OBJECTIVE:  INR   Date Value Ref Range Status   11/18/2019 1.23 (H) 0.86 - 1.14 Final     Chromogenic Factor 10   Date Value Ref Range Status   11/18/2019 89 70 - 130 % Final     Comment:     Therapeutic Range:  A Chromogenic Factor 10 level of approximately 20-40%   inversely correlates with an INR of 2-3 for patients receiving Warfarin.   Chromogenic Factor  10 levels below 20% indicate an INR greater than 3 and   levels above 40% indicate an INR less than 2.       Factor 2 Assay   Date Value Ref Range Status   2012 36 (L) 60 - 140 % Final             Anticoagulation Summary  As of 2019    INR goal:   2.0-3.0   TTR:   --   INR used for dosin.23! (2019)   Warfarin maintenance plan:   10 mg (5 mg x 2) every day   Full warfarin instructions:   10 mg every day   Weekly warfarin total:   70 mg   Plan last modified:   Keyanna Méndez RN (2019)   Next INR check:      Priority:   Critical   Target end date:   Indefinite    Indications    Antiphospholipid antibody syndrome (H) [D68.61]  Chronic anticoagulation [Z79.01]             Anticoagulation Episode Summary     INR check location:       Preferred lab:       Send INR reminders to:   LOUIS RIZZO    Comments:   Chromogenic Factor X and INR monitoring for 3 months per Roque Kidd PA-C * transition from Xarelto bridging with enoxaparin 19; Patient gives permission to leave a detailed message.

## 2019-11-21 ENCOUNTER — TELEPHONE (OUTPATIENT)
Dept: FAMILY MEDICINE | Facility: CLINIC | Age: 29
End: 2019-11-21
Payer: COMMERCIAL

## 2019-11-21 ENCOUNTER — TELEPHONE (OUTPATIENT)
Dept: FAMILY MEDICINE | Facility: CLINIC | Age: 29
End: 2019-11-21

## 2019-11-21 DIAGNOSIS — D68.61 ANTIPHOSPHOLIPID ANTIBODY SYNDROME (H): ICD-10-CM

## 2019-11-21 DIAGNOSIS — Z79.01 CHRONIC ANTICOAGULATION: ICD-10-CM

## 2019-11-21 DIAGNOSIS — Z86.711 HISTORY OF PULMONARY EMBOLISM: ICD-10-CM

## 2019-11-21 DIAGNOSIS — Z53.9 ERRONEOUS ENCOUNTER--DISREGARD: Primary | ICD-10-CM

## 2019-11-21 LAB
FACT X ACT/NOR PPP CHRO: 64 % (ref 70–130)
INR PPP: 1.46 (ref 0.86–1.14)

## 2019-11-21 PROCEDURE — 85260 CLOT FACTOR X STUART-POWER: CPT | Performed by: FAMILY MEDICINE

## 2019-11-21 PROCEDURE — 85610 PROTHROMBIN TIME: CPT | Performed by: FAMILY MEDICINE

## 2019-11-21 PROCEDURE — 36415 COLL VENOUS BLD VENIPUNCTURE: CPT | Performed by: FAMILY MEDICINE

## 2019-11-21 NOTE — TELEPHONE ENCOUNTER
Anticoagulation Monitoring INR Value Factor X   Latest Ref Rng & Units 0.86 - 1.14 70 - 130 %   11/21/2019 1.46 (H) 64 (L)   11/21/2019 11/18/2019 1.23 (H) 89   11/18/2019 11/14/2019 1.13 117   11/14/2019 11/12/2019 1.22 (H) 105   11/12/2019       ANTICOAGULATION MANAGEMENT     Patient Name:  Tone Marley  Date:  11/21/2019    ASSESSMENT /SUBJECTIVE:      Today's INR result of 1.46 and Factor 10 = 64% is subtherapeutic. Goal INR 2.0-3.0 and factor 10 20-40%      Warfarin dose taken: Warfarin taken as previously instructed    Diet: No new diet changes affecting INR    Medication changes/ interactions: No new medications/supplements affecting INR    Previous INR: Therapeutic     S/S of bleeding or thromboembolism: No    New injury or illness:  No    Upcoming surgery, procedure or cardioversion:  No    Additional findings: Initiating      PLAN:    Spoke with Tone regarding INR result and instructed:     Warfarin Dosing Instructions: Change your warfarin dose to 12.5 mg x 2 days; 10 mg all other days    Instructed patient to follow up no later than: 4 days    Education provided: [place]      Tone,  verbalizes understanding and agrees to warfarin dosing plan.    Instructed to call the Anticoagulation Clinic for any changes, questions or concerns. (#[phone])        OBJECTIVE:  INR   Date Value Ref Range Status   11/21/2019 1.46 (H) 0.86 - 1.14 Final     Chromogenic Factor 10   Date Value Ref Range Status   11/21/2019 64 (L) 70 - 130 % Final     Comment:     Therapeutic Range:  A Chromogenic Factor 10 level of approximately 20-40%   inversely correlates with an INR of 2-3 for patients receiving Warfarin.   Chromogenic Factor 10 levels below 20% indicate an INR greater than 3 and   levels above 40% indicate an INR less than 2.       Factor 2 Assay   Date Value Ref Range Status   02/13/2012 36 (L) 60 - 140 % Final             Anticoagulation Summary  As of 11/21/2019    INR goal:   2.0-3.0   TTR:   --    INR used for dosing:   No new INR was available at the time of this encounter.   Warfarin maintenance plan:   12.5 mg (5 mg x 2.5) every Thu, Sat; 10 mg (5 mg x 2) all other days   Full warfarin instructions:   11/22: 12.5 mg; 11/23: 10 mg; Otherwise 12.5 mg every Thu, Sat; 10 mg all other days   Weekly warfarin total:   75 mg   Plan last modified:   Fiordaliza Hatch RN (11/21/2019)   Next INR check:   11/25/2019   Priority:   Critical   Target end date:   Indefinite    Indications    Antiphospholipid antibody syndrome (H) [D68.61]  Chronic anticoagulation [Z79.01]             Anticoagulation Episode Summary     INR check location:       Preferred lab:       Send INR reminders to:   LOUIS RIZZO    Comments:   Chromogenic Factor X and INR monitoring for 3 months per Roque Kidd PA-C * transition from Xarelto bridging with enoxaparin 11/12/19; Patient gives permission to leave a detailed message.

## 2019-11-25 ENCOUNTER — TELEPHONE (OUTPATIENT)
Dept: FAMILY MEDICINE | Facility: CLINIC | Age: 29
End: 2019-11-25

## 2019-11-25 ENCOUNTER — DOCUMENTATION ONLY (OUTPATIENT)
Dept: HEMATOLOGY | Facility: CLINIC | Age: 29
End: 2019-11-25

## 2019-11-25 DIAGNOSIS — Z79.01 CHRONIC ANTICOAGULATION: ICD-10-CM

## 2019-11-25 DIAGNOSIS — D68.61 ANTIPHOSPHOLIPID ANTIBODY SYNDROME (H): ICD-10-CM

## 2019-11-25 DIAGNOSIS — Z86.711 HISTORY OF PULMONARY EMBOLISM: ICD-10-CM

## 2019-11-25 DIAGNOSIS — Z86.73 HISTORY OF CVA (CEREBROVASCULAR ACCIDENT): ICD-10-CM

## 2019-11-25 LAB
FACT X ACT/NOR PPP CHRO: 32 % (ref 70–130)
INR PPP: 1.64 (ref 0.86–1.14)

## 2019-11-25 PROCEDURE — 85610 PROTHROMBIN TIME: CPT | Performed by: FAMILY MEDICINE

## 2019-11-25 PROCEDURE — 36415 COLL VENOUS BLD VENIPUNCTURE: CPT | Performed by: FAMILY MEDICINE

## 2019-11-25 PROCEDURE — 85260 CLOT FACTOR X STUART-POWER: CPT | Performed by: FAMILY MEDICINE

## 2019-11-25 RX ORDER — WARFARIN SODIUM 5 MG/1
TABLET ORAL
Qty: 180 TABLET | Refills: 1 | Status: SHIPPED | OUTPATIENT
Start: 2019-11-25 | End: 2019-11-25

## 2019-11-25 RX ORDER — WARFARIN SODIUM 10 MG/1
TABLET ORAL
Qty: 90 TABLET | Refills: 1 | Status: SHIPPED | OUTPATIENT
Start: 2019-11-25 | End: 2020-05-22

## 2019-11-25 NOTE — TELEPHONE ENCOUNTER
ANTICOAGULATION  MANAGEMENT-Patient Home Monitoring Result    Assessment     Therapeutic Factor 10 result of 32% . Goal range 20-40%. Received result via Clinic Lab.        Previous factor 10 was sub therapeutic.     Tone was contacted by phone today with dosing instructions.    Plan     Continue current coumadin dose of 10 mg daily along with Lovenox. Recheck INR/Factor 10 on Wednesday       OBJECTIVE    INR   Date Value Ref Range Status   11/25/2019 1.64 (H) 0.86 - 1.14 Final     Chromogenic Factor 10   Date Value Ref Range Status   11/25/2019 32 (L) 70 - 130 % Final     Comment:     Therapeutic Range:  A Chromogenic Factor 10 level of approximately 20-40%   inversely correlates with an INR of 2-3 for patients receiving Warfarin.   Chromogenic Factor 10 levels below 20% indicate an INR greater than 3 and   levels above 40% indicate an INR less than 2.       Factor 2 Assay   Date Value Ref Range Status   02/13/2012 36 (L) 60 - 140 % Final       ASSESSMENT / PLAN      Anticoagulation Summary  As of 11/25/2019    INR goal:   2.0-3.0   TTR:   0.0 % (3 d)   INR used for dosing:   No new INR was available at the time of this encounter.   Warfarin maintenance plan:   12.5 mg (5 mg x 2.5) every Thu, Sat; 10 mg (5 mg x 2) all other days   Full warfarin instructions:   12.5 mg every Thu, Sat; 10 mg all other days   Weekly warfarin total:   75 mg   No change documented:   Fiordaliza Hatch, RN   Plan last modified:   Fiordaliza Hatch RN (11/21/2019)   Next INR check:   11/27/2019   Priority:   Critical   Target end date:   Indefinite    Indications    Antiphospholipid antibody syndrome (H) [D68.61]  Chronic anticoagulation [Z79.01]             Anticoagulation Episode Summary     INR check location:       Preferred lab:       Send INR reminders to:   LOUIS RIZZO    Comments:   Chromogenic Factor X and INR monitoring for 3 months per Roque Kidd PA-C * transition from Xarelto bridging with enoxaparin 11/12/19; Patient  gives permission to leave a detailed message.

## 2019-11-25 NOTE — PROGRESS NOTES
RN received phone call from anticoagulation monitoring team regarding patient transition from Xarelto to warfarin (using chromogenic X to monitor due to antiphospholipid antibody syndrome). Team is wondering what the protocol for when to stop lovenox.     Per [facility] policy patient is to continue lovenox until chromogenic X is within therapeutic range x2. Call back number was given for any further questions comments or concerns.     Viviane Gamble RN - Nurse Clinician - [facility] - [phone]

## 2019-11-25 NOTE — TELEPHONE ENCOUNTER
Anticoagulation Management    Unable to reach Tone today.    Today's Factor 10 result of 32% and is therapeutic (goal Range of 20-40%).  Result received from: Clinic Lab    Follow up required to discuss dosing instructions and confirm understanding of instructions.  Patient is also on Lovenox and will need to continue until 2 in-range factor 10 seen.      Left message for patient to return a call directly to [facility] [place] line: [phone].         Anticoagulation clinic to follow up    Fiordaliza Hatch RN

## 2019-11-25 NOTE — TELEPHONE ENCOUNTER
"Anticoagulation Monitoring INR Value Factor X   Latest Ref Rng & Units 0.86 - 1.14 70 - 130 %   11/25/2019 1.64 (H) 32 (L)   11/21/2019 1.46 (H) 64 (L)   11/21/2019 11/21/2019 11/18/2019 1.23 (H) 89   11/18/2019 11/14/2019 1.13 117   11/14/2019 11/12/2019 1.22 (H) 105   11/12/2019         \"...Per Roque Kidd PA-C. He will begin at 5mg daily with Enoxaparin 120mg ecery 12 hours. He will be checking INR and Chromogenic Factor X but will be dosed based off his Factor X results per Roque. Roque wants this comparison to continue for 3 months per order.    Anticoagulation transition plan from Xarelto to Warfarin is as follow:  1. Stop Xarelto as of today (patient usually takes Xarelto in the evening.)  2. Start Enoxaparin at 1 mg/kg SubQ Q 12 hours (120 mg) tonight along with Warfarin at 5 mg PO Qday dosing.   3. Obtain both Chromogenic factor X level and INR for 2-3 months for comparison study as the patient has APS.  4. Use chromogenic factor X at this time during the comparison study period to adjust his Warfarin dosing. Chromogenic factor X therapeutic level should be at 20-40%.   5.   Return to clinic to see me in 3 months to discuss comparison study        result. ...\"     Called to confirm parameters for discontinuing Lovenox.    \"...Per [facility] policy patient is to continue lovenox until chromogenic X is within therapeutic range x2. Call back number was given for any further questions comments or concerns. ...\"    Fiordaliza Hatch, RN  Anticoagulation Nurse - [facility], [place]    "

## 2019-11-26 NOTE — TELEPHONE ENCOUNTER
Prescription approved per [facility] Refill Protocol.  Fiordaliza Hatch, RN  Anticoagulation Nurse - [facility]

## 2019-11-27 ENCOUNTER — TELEPHONE (OUTPATIENT)
Dept: FAMILY MEDICINE | Facility: CLINIC | Age: 29
End: 2019-11-27

## 2019-11-27 DIAGNOSIS — Z79.01 CHRONIC ANTICOAGULATION: ICD-10-CM

## 2019-11-27 DIAGNOSIS — D68.61 ANTIPHOSPHOLIPID ANTIBODY SYNDROME (H): ICD-10-CM

## 2019-11-27 DIAGNOSIS — Z86.711 HISTORY OF PULMONARY EMBOLISM: ICD-10-CM

## 2019-11-27 LAB — INR PPP: 1.59 (ref 0.86–1.14)

## 2019-11-27 PROCEDURE — 85260 CLOT FACTOR X STUART-POWER: CPT | Performed by: FAMILY MEDICINE

## 2019-11-27 PROCEDURE — 85610 PROTHROMBIN TIME: CPT | Performed by: FAMILY MEDICINE

## 2019-11-27 PROCEDURE — 36415 COLL VENOUS BLD VENIPUNCTURE: CPT | Performed by: FAMILY MEDICINE

## 2019-11-27 NOTE — TELEPHONE ENCOUNTER
SN  Just wanted to send to you since you are on call tomorrow and give you a heads up.    Thank you,  CAMI Whiteside Stephanie, RN Shamsi, Yara Leal MD; [place] Triage; [facility] 24 minutes ago (5:01 PM)      Dr. Faust or on call provider please review CF X result tomorrow if between 20-40% please reach out to the patient and have him discontinue Lovenox and continue with warfarin dose plan.   Thank you.    Routing comment

## 2019-11-27 NOTE — TELEPHONE ENCOUNTER
ANTICOAGULATION MANAGEMENT     Patient Name:  Tone Marley  Date:  11/27/2019    ASSESSMENT /SUBJECTIVE:    Chromogenic factor X result is was not resulted today before the tech's shift ended.  Today's INR result of 1.59 is subtherapeutic. Goal INR of 2.0-3.0      Warfarin dose taken: Warfarin taken as previously instructed    Diet: No new diet changes affecting INR    Medication changes/ interactions: No new medications/supplements affecting INR    Previous INR: Therapeutic CF X    S/S of bleeding or thromboembolism: No    New injury or illness:  No    Upcoming surgery, procedure or cardioversion:  No    Additional findings: Continue LOVENOX injections until CF X is known      PLAN:    Spoke with Tone regarding INR result and instructed:     Warfarin Dosing Instructions: 12.5mg today and 10mg Thurs, Continue Lovenox injections    Instructed patient to follow up no later than: 11/29/19    Education provided: Yes: Discussed with Tone that he would most likely proceed to management of CF X only. Advised him his chart would be sent to on-call for review tomorrow, If CF X is between 20-40% he can stop Lovenox and continue warfarin as dosed.      Tone verbalizes understanding and agrees to warfarin dosing plan.    Instructed to call the Anticoagulation Clinic for any changes, questions or concerns. (#[phone])        OBJECTIVE:  INR   Date Value Ref Range Status   11/27/2019 1.59 (H) 0.86 - 1.14 Final     Chromogenic Factor 10   Date Value Ref Range Status   11/25/2019 32 (L) 70 - 130 % Final     Comment:     Therapeutic Range:  A Chromogenic Factor 10 level of approximately 20-40%   inversely correlates with an INR of 2-3 for patients receiving Warfarin.   Chromogenic Factor 10 levels below 20% indicate an INR greater than 3 and   levels above 40% indicate an INR less than 2.       Factor 2 Assay   Date Value Ref Range Status   02/13/2012 36 (L) 60 - 140 % Final

## 2019-11-28 LAB — FACT X ACT/NOR PPP CHRO: 53 % (ref 70–130)

## 2019-11-29 ENCOUNTER — TELEPHONE (OUTPATIENT)
Dept: FAMILY MEDICINE | Facility: CLINIC | Age: 29
End: 2019-11-29

## 2019-11-29 DIAGNOSIS — D68.61 ANTIPHOSPHOLIPID ANTIBODY SYNDROME (H): ICD-10-CM

## 2019-11-29 DIAGNOSIS — Z86.711 HISTORY OF PULMONARY EMBOLISM: ICD-10-CM

## 2019-11-29 DIAGNOSIS — Z79.01 CHRONIC ANTICOAGULATION: ICD-10-CM

## 2019-11-29 LAB
FACT X ACT/NOR PPP CHRO: 41 % (ref 70–130)
INR PPP: 1.72 (ref 0.86–1.14)

## 2019-11-29 PROCEDURE — 85610 PROTHROMBIN TIME: CPT | Performed by: FAMILY MEDICINE

## 2019-11-29 PROCEDURE — 36415 COLL VENOUS BLD VENIPUNCTURE: CPT | Performed by: FAMILY MEDICINE

## 2019-11-29 PROCEDURE — 85260 CLOT FACTOR X STUART-POWER: CPT | Performed by: FAMILY MEDICINE

## 2019-11-29 NOTE — TELEPHONE ENCOUNTER
ANTICOAGULATION MANAGEMENT     Patient Name:  Tone Marley  Date:  2019    ASSESSMENT /SUBJECTIVE:    Today's INR result of 1.72 is subtherapeutic. Goal INR of 2.0-3.0    Today's Chromogenic Factor 10 result of 41% is subtherapeutic. Goal CF 10 of 40%-20%.      Warfarin dose taken: Warfarin taken as previously instructed    Diet: No new diet changes affecting INR    Medication changes/ interactions: No new medications/supplements affecting INR    Previous INR: Subtherapeutic     S/S of bleeding or thromboembolism: No    New injury or illness:  No    Upcoming surgery, procedure or cardioversion:  No    Additional findings: None    PLAN:    Spoke with Tone regarding INR/CF10 result and instructed:     Warfarin Dosing Instructions: 12.5 mg Fr/Sa/Ulrich (with future plan of 10mg Mo/; 12.5mg all other days). Instructed to continue Lovenox until two therapeuticCF 10 results in a row per Heme. Has 10 Lovenox syringes left.    Instructed patient to follow up no later than: 19    Education provided: Yes: INR and CF 10 goal ranges    Tone verbalizes understanding and agrees to warfarin dosing plan.    Instructed to call the Anticoagulation Clinic for any changes, questions or concerns. (#[phone])        OBJECTIVE:  INR   Date Value Ref Range Status   2019 1.72 (H) 0.86 - 1.14 Final     Chromogenic Factor 10   Date Value Ref Range Status   2019 41 (L) 70 - 130 % Final     Comment:     Therapeutic Range:  A Chromogenic Factor 10 level of approximately 20-40%   inversely correlates with an INR of 2-3 for patients receiving Warfarin.   Chromogenic Factor 10 levels below 20% indicate an INR greater than 3 and   levels above 40% indicate an INR less than 2.       Factor 2 Assay   Date Value Ref Range Status   2012 36 (L) 60 - 140 % Final             Anticoagulation Summary  As of 2019    INR goal:   2.0-3.0   TTR:   0.0 % (1 wk)   INR used for dosin.72! (2019)   Warfarin  maintenance plan:   10 mg (5 mg x 2) every Mon, Thu; 12.5 mg (5 mg x 2.5) all other days   Full warfarin instructions:   10 mg every Mon, Thu; 12.5 mg all other days   Weekly warfarin total:   82.5 mg   Plan last modified:   Priscila Plasencia [facility] (11/29/2019)   Next INR check:   12/2/2019   Priority:   Critical   Target end date:   Indefinite    Indications    Antiphospholipid antibody syndrome (H) [D68.61]  Chronic anticoagulation [Z79.01]             Anticoagulation Episode Summary     INR check location:       Preferred lab:       Send INR reminders to:   LOUIS RIZZO    Comments:   Chromogenic Factor X and INR monitoring for 3 months per Roque Kidd PA-C * transition from Xarelto bridging with enoxaparin 11/12/19; Patient gives permission to leave a detailed message.

## 2019-11-29 NOTE — TELEPHONE ENCOUNTER
CF 10 from 11/27/19 = 53%.  See 11/29/19 encounter; instructed pt based on newer lab results.  Paulette Medina RN on 11/29/2019 at 4:08 PM

## 2019-12-02 ENCOUNTER — TELEPHONE (OUTPATIENT)
Dept: FAMILY MEDICINE | Facility: CLINIC | Age: 29
End: 2019-12-02

## 2019-12-02 DIAGNOSIS — Z86.711 HISTORY OF PULMONARY EMBOLISM: ICD-10-CM

## 2019-12-02 DIAGNOSIS — D68.61 ANTIPHOSPHOLIPID ANTIBODY SYNDROME (H): ICD-10-CM

## 2019-12-02 DIAGNOSIS — Z79.01 CHRONIC ANTICOAGULATION: ICD-10-CM

## 2019-12-02 LAB
FACT X ACT/NOR PPP CHRO: 34 % (ref 70–130)
INR PPP: 2 (ref 0.86–1.14)

## 2019-12-02 PROCEDURE — 85610 PROTHROMBIN TIME: CPT | Performed by: FAMILY MEDICINE

## 2019-12-02 PROCEDURE — 36415 COLL VENOUS BLD VENIPUNCTURE: CPT | Performed by: FAMILY MEDICINE

## 2019-12-02 PROCEDURE — 85260 CLOT FACTOR X STUART-POWER: CPT | Performed by: FAMILY MEDICINE

## 2019-12-02 NOTE — TELEPHONE ENCOUNTER
Order   Factor 10 chromogenic [[id number]] (Order [id number])   Order Requisition     Factor 10 chromogenic (Order #[id number]) on 12/2/19   Exam Information     Exam Date Exam Time Accession # Results    12/2/19  8:46 AM [id number]    Component Value Flag Ref Range Units Status Collected Lab   Chromogenic Factor 10 34  Low   70 - 130 %            ANTICOAGULATION MANAGEMENT     Patient Name:  Tone Marley  Date:  12/2/2019    ASSESSMENT /SUBJECTIVE:    Chromogenic Factor X 34% Goal 20%-40%  Today's INR result of 2.00 is therapeutic. Goal INR of 2.0-3.0      Warfarin dose taken: Warfarin taken as previously instructed    Diet: No new diet changes affecting INR    Medication changes/ interactions: No new medications/supplements affecting INR    Previous INR: Subtherapeutic     S/S of bleeding or thromboembolism: No    New injury or illness:  No    Upcoming surgery, procedure or cardioversion:  No    Additional findings: None      PLAN:    Spoke with Tone regarding INR result and instructed:     Warfarin Dosing Instructions: Continue 10mg Mon/Thur and 12.5mg the rest of the days of the week. Continue enoxaparin injections    Instructed patient to follow up no later than: 12/4/19    Education provided: No       verbalizes understanding and agrees to warfarin dosing plan.    Instructed to call the Anticoagulation Clinic for any changes, questions or concerns. (#[phone])        OBJECTIVE:  INR   Date Value Ref Range Status   12/02/2019 2.00 (H) 0.86 - 1.14 Final     Chromogenic Factor 10   Date Value Ref Range Status   12/02/2019 34 (L) 70 - 130 % Final     Comment:     Therapeutic Range:  A Chromogenic Factor 10 level of approximately 20-40%   inversely correlates with an INR of 2-3 for patients receiving Warfarin.   Chromogenic Factor 10 levels below 20% indicate an INR greater than 3 and   levels above 40% indicate an INR less than 2.       Factor 2 Assay   Date Value Ref Range Status   02/13/2012 36 (L)  60 - 140 % Final

## 2019-12-03 ENCOUNTER — TELEPHONE (OUTPATIENT)
Dept: FAMILY MEDICINE | Facility: CLINIC | Age: 29
End: 2019-12-03

## 2019-12-03 DIAGNOSIS — Z86.711 HISTORY OF PULMONARY EMBOLISM: ICD-10-CM

## 2019-12-03 DIAGNOSIS — D68.61 ANTIPHOSPHOLIPID ANTIBODY SYNDROME (H): ICD-10-CM

## 2019-12-03 DIAGNOSIS — Z79.01 CHRONIC ANTICOAGULATION: ICD-10-CM

## 2019-12-03 LAB
FACT X ACT/NOR PPP CHRO: 32 % (ref 70–130)
INR PPP: 2.01 (ref 0.86–1.14)

## 2019-12-03 PROCEDURE — 85260 CLOT FACTOR X STUART-POWER: CPT | Performed by: FAMILY MEDICINE

## 2019-12-03 PROCEDURE — 36415 COLL VENOUS BLD VENIPUNCTURE: CPT | Performed by: FAMILY MEDICINE

## 2019-12-03 PROCEDURE — 85610 PROTHROMBIN TIME: CPT | Performed by: FAMILY MEDICINE

## 2019-12-03 NOTE — TELEPHONE ENCOUNTER
Anticoagulation Management    Unable to reach jaky today.    Today's Factor 10 result of 32% and is therapeutic    Left detailed message to Stop Lovenox, continue current coumadin dose of 10 mg Mon, Thu, and 12.5 mg all other days and recheck INR in one week 12/9.      Anticoagulation clinic to follow up    Fiordaliza Hatch RN

## 2019-12-03 NOTE — TELEPHONE ENCOUNTER
Anticoagulation Monitoring INR Value INR Goal Range Factor X   Latest Ref Rng & Units 0.86 - 1.14  70 - 130 %   12/3/2019 2.01 (H)  32 (L)   2019 2.00 (H)  34 (L)   2019  2.0-3.0    2019 1.72 (H)  41 (L)   2019  2.0-3.0    2019 1.59 (H)  53 (L)   2019  2.0-3.0    2019 1.64 (H)  32 (L)   2019  2.0-3.0    2019 1.46 (H)  64 (L)       ANTICOAGULATION  MANAGEMENT    Assessment     Therapeutic Factor 10 result of 32% . Goal range 20-40%. Received Result via clinic lab.         Previous Factor 10 was therapeutic    Plan     Stop Lovenox; continue current coumadin dose of 10 mg Mon, Thu, and 12.5 mg all other days; recheck INR in 1 week on .?       OBJECTIVE    INR   Date Value Ref Range Status   2019 2.01 (H) 0.86 - 1.14 Final     Chromogenic Factor 10   Date Value Ref Range Status   2019 32 (L) 70 - 130 % Final     Comment:     Therapeutic Range:  A Chromogenic Factor 10 level of approximately 20-40%   inversely correlates with an INR of 2-3 for patients receiving Warfarin.   Chromogenic Factor 10 levels below 20% indicate an INR greater than 3 and   levels above 40% indicate an INR less than 2.       Factor 2 Assay   Date Value Ref Range Status   2012 36 (L) 60 - 140 % Final       ASSESSMENT / PLAN      Anticoagulation Summary  As of 12/3/2019    INR goal:   2.0-3.0   TTR:   8.7 % (1.6 wk)   INR used for dosin.01 (12/3/2019)   Warfarin maintenance plan:   10 mg (5 mg x 2) every Mon, Thu; 12.5 mg (5 mg x 2.5) all other days   Full warfarin instructions:   10 mg every Mon, Thu; 12.5 mg all other days   Weekly warfarin total:   82.5 mg   No change documented:   Fiordaliza Hatch, RN   Plan last modified:   Priscila Plasencia, [facility] (2019)   Next INR check:   2019   Priority:   Critical   Target end date:   Indefinite    Indications    Antiphospholipid antibody syndrome (H) [D68.61]  Chronic anticoagulation [Z79.01]             Anticoagulation  Episode Summary     INR check location:       Preferred lab:       Send INR reminders to:   LOUIS RIZZO    Comments:   Chromogenic Factor X and INR monitoring for 3 months per Roque Kidd PA-C * transition from Xarelto bridging with enoxaparin 11/12/19; Patient gives permission to leave a detailed message.

## 2019-12-03 NOTE — TELEPHONE ENCOUNTER
Pt returned call.  Repeated message that was left per previous note to stop Lovenox, continue current warfarin dose of 10mg Mon/Thurs and 12.5mg all other days of the week. Lab draw scheduled for pt for 1 week from today for follow up.    Paulette Medina RN on 12/3/2019 at 5:22 PM

## 2019-12-10 ENCOUNTER — TELEPHONE (OUTPATIENT)
Dept: FAMILY MEDICINE | Facility: CLINIC | Age: 29
End: 2019-12-10

## 2019-12-10 DIAGNOSIS — Z86.711 HISTORY OF PULMONARY EMBOLISM: ICD-10-CM

## 2019-12-10 DIAGNOSIS — D68.61 ANTIPHOSPHOLIPID ANTIBODY SYNDROME (H): ICD-10-CM

## 2019-12-10 DIAGNOSIS — Z79.01 CHRONIC ANTICOAGULATION: ICD-10-CM

## 2019-12-10 LAB
FACT X ACT/NOR PPP CHRO: 31 % (ref 70–130)
INR PPP: 1.87 (ref 0.86–1.14)

## 2019-12-10 PROCEDURE — 36415 COLL VENOUS BLD VENIPUNCTURE: CPT | Performed by: FAMILY MEDICINE

## 2019-12-10 PROCEDURE — 85260 CLOT FACTOR X STUART-POWER: CPT | Performed by: FAMILY MEDICINE

## 2019-12-10 PROCEDURE — 85610 PROTHROMBIN TIME: CPT | Performed by: FAMILY MEDICINE

## 2019-12-10 NOTE — TELEPHONE ENCOUNTER
ANTICOAGULATION MANAGEMENT     Patient Name:  Tone Marley  Date:  12/10/2019    ASSESSMENT /SUBJECTIVE:  Anticoagulation Monitoring INR Value INR Goal Range Factor X   Latest Ref Rng & Units 0.86 - 1.14  70 - 130 %   12/10/2019 1.87 (H)  31 (L)     Anticoagulation Monitoring Last Week Total Next Week Total Return Date   Latest Ref Rng & Units      12/10/2019        Anticoagulation Monitoring Instructions   Latest Ref Rng & Units    12/10/2019 10 mg every Mon, Thu; 12.5 mg all other days       CF X therapeutic . Goal INR of 20%-40%    Previous INR: Therapeutic       PLAN:    Left detailed message for Tone regarding INR result and instructed:     Warfarin Dosing Instructions: Continue your current warfarin dose    Instructed patient to follow up no later than: 1 week    Education provided: No      Instructed to call the Anticoagulation Clinic for any changes, questions or concerns. (#[phone])        OBJECTIVE:  INR   Date Value Ref Range Status   12/10/2019 1.87 (H) 0.86 - 1.14 Final     Chromogenic Factor 10   Date Value Ref Range Status   12/10/2019 31 (L) 70 - 130 % Final     Comment:     Therapeutic Range:  A Chromogenic Factor 10 level of approximately 20-40%   inversely correlates with an INR of 2-3 for patients receiving Warfarin.   Chromogenic Factor 10 levels below 20% indicate an INR greater than 3 and   levels above 40% indicate an INR less than 2.       Factor 2 Assay   Date Value Ref Range Status   02/13/2012 36 (L) 60 - 140 % Final

## 2019-12-17 ENCOUNTER — TELEPHONE (OUTPATIENT)
Dept: FAMILY MEDICINE | Facility: CLINIC | Age: 29
End: 2019-12-17

## 2019-12-17 DIAGNOSIS — D68.61 ANTIPHOSPHOLIPID ANTIBODY SYNDROME (H): ICD-10-CM

## 2019-12-17 DIAGNOSIS — Z79.01 CHRONIC ANTICOAGULATION: ICD-10-CM

## 2019-12-17 DIAGNOSIS — Z86.711 HISTORY OF PULMONARY EMBOLISM: ICD-10-CM

## 2019-12-17 LAB
FACT X ACT/NOR PPP CHRO: 23 % (ref 70–130)
INR PPP: 2.42 (ref 0.86–1.14)

## 2019-12-17 PROCEDURE — 85610 PROTHROMBIN TIME: CPT | Performed by: FAMILY MEDICINE

## 2019-12-17 PROCEDURE — 36415 COLL VENOUS BLD VENIPUNCTURE: CPT | Performed by: FAMILY MEDICINE

## 2019-12-17 PROCEDURE — 85260 CLOT FACTOR X STUART-POWER: CPT | Performed by: FAMILY MEDICINE

## 2019-12-17 NOTE — TELEPHONE ENCOUNTER
Tone returning call.  Assessed for S/S bleeding, clotting, medication, diet, health, activity and alcohol changes.  Next appointment scheduled for 12/23/2019 due to holiday.    Michelle Arvizu, PharmD BCACP  Anticoagulation Clinical Pharmacist

## 2019-12-17 NOTE — TELEPHONE ENCOUNTER
Anticoagulation Monitoring INR Value Factor X   Latest Ref Rng & Units 0.86 - 1.14 70 - 130 %   2019 2.42 (H) 23 (L)   12/10/2019 1.87 (H) 31 (L)   12/10/2019     12/3/2019 2.01 (H) 32 (L)   12/3/2019     2019 2.00 (H) 34 (L)   2019 1.72 (H) 41 (L)   2019 1.59 (H) 53 (L)   2019 1.64 (H) 32 (L)   2019 1.46 (H) 64 (L)       ANTICOAGULATION  MANAGEMENT    Assessment     Therapeutic Factor 10 of 23%. Goal range 20-40%.. Received result via Clinic Lab.     Previous Factor 10 is Therapeutic    Detailed message left for Tone with dosing information as well as [employer] message.     Plan     Continue current coumadin dosing of    10 mg every Mon, Thu; 12.5 mg all other days     And recheck INR/Factor X in 1 week.?       OBJECTIVE    INR   Date Value Ref Range Status   2019 2.42 (H) 0.86 - 1.14 Final     Chromogenic Factor 10   Date Value Ref Range Status   2019 23 (L) 70 - 130 % Final     Comment:     Therapeutic Range:  A Chromogenic Factor 10 level of approximately 20-40%   inversely correlates with an INR of 2-3 for patients receiving Warfarin.   Chromogenic Factor 10 levels below 20% indicate an INR greater than 3 and   levels above 40% indicate an INR less than 2.       Factor 2 Assay   Date Value Ref Range Status   2012 36 (L) 60 - 140 % Final       ASSESSMENT / PLAN      Anticoagulation Summary  As of 2019    INR goal:   2.0-3.0   TTR:   26.9 % (3.6 wk)   INR used for dosin.42 (2019)   Warfarin maintenance plan:   10 mg (5 mg x 2) every Mon, Thu; 12.5 mg (5 mg x 2.5) all other days   Full warfarin instructions:   10 mg every Mon, Thu; 12.5 mg all other days   Weekly warfarin total:   82.5 mg   Plan last modified:   Priscila Plasencia, [facility] (2019)   Next INR check:   2019   Priority:   Critical   Target end date:   Indefinite    Indications    Antiphospholipid antibody syndrome (H)  [D68.61]  Chronic anticoagulation [Z79.01]             Anticoagulation Episode Summary     INR check location:       Preferred lab:       Send INR reminders to:   LOUIS RIZZO    Comments:   Chromogenic Factor X (20-40%) and INR monitoring for 3 months per Roque Kidd PA-C * transition from Xarelto;  Patient gives permission to leave a detailed message.

## 2019-12-23 ENCOUNTER — TELEPHONE (OUTPATIENT)
Dept: FAMILY MEDICINE | Facility: CLINIC | Age: 29
End: 2019-12-23

## 2019-12-23 DIAGNOSIS — Z79.01 CHRONIC ANTICOAGULATION: ICD-10-CM

## 2019-12-23 DIAGNOSIS — Z86.711 HISTORY OF PULMONARY EMBOLISM: ICD-10-CM

## 2019-12-23 DIAGNOSIS — D68.61 ANTIPHOSPHOLIPID ANTIBODY SYNDROME (H): ICD-10-CM

## 2019-12-23 LAB — FACT X ACT/NOR PPP CHRO: 22 % (ref 70–130)

## 2019-12-23 PROCEDURE — 85260 CLOT FACTOR X STUART-POWER: CPT | Performed by: FAMILY MEDICINE

## 2019-12-23 PROCEDURE — 36415 COLL VENOUS BLD VENIPUNCTURE: CPT | Performed by: FAMILY MEDICINE

## 2019-12-23 NOTE — TELEPHONE ENCOUNTER
Anticoagulation Monitoring INR Value Factor X   Latest Ref Rng & Units 0.86 - 1.14 70 - 130 %   12/23/2019  22 (L)   12/17/2019 2.42 (H) 23 (L)   12/17/2019     12/10/2019 1.87 (H) 31 (L)   12/10/2019     12/3/2019 2.01 (H) 32 (L)   12/3/2019     12/2/2019 2.00 (H) 34 (L)   12/2/2019 11/29/2019 1.72 (H) 41 (L)   11/29/2019 11/27/2019 1.59 (H) 53 (L)         ANTICOAGULATION  MANAGEMENT    Assessment     Therapeutic Factor 10 = 22%. Goal range 20-40%. Received lab result via Clinic Lab.         Previous Factor 10 was therapeutic    Tone was contacted by phone: Today. Detailed message left for patient with dosing information as noted below.    Plan     Continue current coumadin dosing of 10 mg Mon, Thu, and 12.5 mg all other days of the week. Recheck Factor 10 and INR in 2 weeks.    OBJECTIVE    INR   Date Value Ref Range Status   12/17/2019 2.42 (H) 0.86 - 1.14 Final     Chromogenic Factor 10   Date Value Ref Range Status   12/23/2019 22 (L) 70 - 130 % Final     Comment:     Therapeutic Range:  A Chromogenic Factor 10 level of approximately 20-40%   inversely correlates with an INR of 2-3 for patients receiving Warfarin.   Chromogenic Factor 10 levels below 20% indicate an INR greater than 3 and   levels above 40% indicate an INR less than 2.       Factor 2 Assay   Date Value Ref Range Status   02/13/2012 36 (L) 60 - 140 % Final       ASSESSMENT / PLAN      Anticoagulation Summary  As of 12/23/2019    INR goal:   2.0-3.0   TTR:   26.9 % (3.6 wk)   INR used for dosing:   No new INR was available at the time of this encounter.   Warfarin maintenance plan:   10 mg (5 mg x 2) every Mon, Thu; 12.5 mg (5 mg x 2.5) all other days   Full warfarin instructions:   10 mg every Mon, Thu; 12.5 mg all other days   Weekly warfarin total:   82.5 mg   Plan last modified:   Priscila Plasencia [facility] (11/29/2019)   Next INR check:   1/6/2020   Priority:   Critical   Target end date:   Indefinite    Indications    Antiphospholipid  antibody syndrome (H) [D68.61]  Chronic anticoagulation [Z79.01]             Anticoagulation Episode Summary     INR check location:       Preferred lab:       Send INR reminders to:   LOUIS RIZZO    Comments:   Chromogenic Factor X (20-40%) and INR monitoring for 3 months per Roque Kidd PA-C * transition from Xarelto;  Patient gives permission to leave a detailed message.

## 2020-01-06 ENCOUNTER — TELEPHONE (OUTPATIENT)
Dept: FAMILY MEDICINE | Facility: CLINIC | Age: 30
End: 2020-01-06

## 2020-01-06 ENCOUNTER — DOCUMENTATION ONLY (OUTPATIENT)
Dept: LAB | Facility: CLINIC | Age: 30
End: 2020-01-06

## 2020-01-06 DIAGNOSIS — Z86.711 HISTORY OF PULMONARY EMBOLISM: ICD-10-CM

## 2020-01-06 DIAGNOSIS — R82.998 DARK URINE: Primary | ICD-10-CM

## 2020-01-06 DIAGNOSIS — Z79.01 CHRONIC ANTICOAGULATION: ICD-10-CM

## 2020-01-06 DIAGNOSIS — E29.1 HYPOGONADISM MALE: ICD-10-CM

## 2020-01-06 DIAGNOSIS — Z53.9 ERRONEOUS ENCOUNTER--DISREGARD: Primary | ICD-10-CM

## 2020-01-06 DIAGNOSIS — D68.61 ANTIPHOSPHOLIPID ANTIBODY SYNDROME (H): ICD-10-CM

## 2020-01-06 DIAGNOSIS — D68.61 ANTIPHOSPHOLIPID ANTIBODY SYNDROME (H): Primary | ICD-10-CM

## 2020-01-06 DIAGNOSIS — I10 BENIGN ESSENTIAL HYPERTENSION: ICD-10-CM

## 2020-01-06 DIAGNOSIS — Z86.73 HISTORY OF CVA (CEREBROVASCULAR ACCIDENT): ICD-10-CM

## 2020-01-06 DIAGNOSIS — R79.89 LOW SERUM TESTOSTERONE: ICD-10-CM

## 2020-01-06 LAB
FACT X ACT/NOR PPP CHRO: 20 % (ref 70–130)
FSH SERPL-ACNC: 2.8 IU/L (ref 0.7–10.8)
INR PPP: 3.74 (ref 0.86–1.14)
LH SERPL-ACNC: 3.8 IU/L (ref 1.5–9.3)
MISCELLANEOUS TEST: NORMAL
PROLACTIN SERPL-MCNC: 25 UG/L (ref 2–18)

## 2020-01-06 PROCEDURE — 84270 ASSAY OF SEX HORMONE GLOBUL: CPT | Performed by: INTERNAL MEDICINE

## 2020-01-06 PROCEDURE — 83003 ASSAY GROWTH HORMONE (HGH): CPT | Performed by: INTERNAL MEDICINE

## 2020-01-06 PROCEDURE — 99000 SPECIMEN HANDLING OFFICE-LAB: CPT | Performed by: INTERNAL MEDICINE

## 2020-01-06 PROCEDURE — 83001 ASSAY OF GONADOTROPIN (FSH): CPT | Performed by: INTERNAL MEDICINE

## 2020-01-06 PROCEDURE — 83002 ASSAY OF GONADOTROPIN (LH): CPT | Performed by: INTERNAL MEDICINE

## 2020-01-06 PROCEDURE — 84403 ASSAY OF TOTAL TESTOSTERONE: CPT | Performed by: INTERNAL MEDICINE

## 2020-01-06 PROCEDURE — 36415 COLL VENOUS BLD VENIPUNCTURE: CPT | Performed by: INTERNAL MEDICINE

## 2020-01-06 PROCEDURE — 85610 PROTHROMBIN TIME: CPT | Performed by: INTERNAL MEDICINE

## 2020-01-06 PROCEDURE — 84146 ASSAY OF PROLACTIN: CPT | Performed by: INTERNAL MEDICINE

## 2020-01-06 PROCEDURE — 84305 ASSAY OF SOMATOMEDIN: CPT | Performed by: INTERNAL MEDICINE

## 2020-01-06 PROCEDURE — 85260 CLOT FACTOR X STUART-POWER: CPT | Performed by: INTERNAL MEDICINE

## 2020-01-06 NOTE — PROGRESS NOTES
Patient was seen in Lab for blood draw and drawn for what was ordered.   Patient requested for a CBC.  Lab jeni 'just in case tube to add-n on if needed.  Please place orders, if needed.  Thank you Lab,
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
## 2020-01-06 NOTE — TELEPHONE ENCOUNTER
Patient would like to have UA done.  He states he noted blood in his urine x 3 days.  Light colored more brown than red.  He would like to come in late today or early tomorrow AM for UA.  Please call patient and advise.  Received a VM at [facility].  Thank you.  Fiordaliza Hatch, RN  Anticoagulation Nurse - [facility] [place]

## 2020-01-06 NOTE — TELEPHONE ENCOUNTER
Please make TE appointment and inform patient  Lab only appointment to get urine done prior to TE.  Thanks

## 2020-01-07 LAB
GH SERPL-MCNC: <0.1 UG/L
IGF-I BLD-MCNC: 167 NG/ML (ref 82–244)

## 2020-01-07 NOTE — TELEPHONE ENCOUNTER
LD -     Schedule a lab appointment for a UA and CBC in AM then schedule a phone visit with Govind in the afternoon to discuss results.     Recommended to check with insurance to see if phone visits are covered. Mentioned 10 min is $30 but usually doesn't exceed this timeframe.     Can call back to discuss with triage or just call back to FD to schedule.    Angela Cheng RN

## 2020-01-08 LAB
MACROPROLACTIN SERPL-MCNC: 6.9 NG/ML (ref 2.1–13.3)
MACROPROLACTIN/PROLACTIN MFR SERPL: 82.1 %
PROLACTIN SERPL IA-MCNC: 8.4 NG/ML (ref 2.1–17.7)
SHBG SERPL-SCNC: 11 NMOL/L (ref 11–80)
TESTOST FREE SERPL-MCNC: 6.25 NG/DL (ref 4.7–24.4)
TESTOST SERPL-MCNC: 203 NG/DL (ref 240–950)

## 2020-01-09 ENCOUNTER — HOSPITAL ENCOUNTER (OUTPATIENT)
Dept: MRI IMAGING | Facility: CLINIC | Age: 30
Discharge: HOME | End: 2020-01-09
Attending: INTERNAL MEDICINE | Admitting: INTERNAL MEDICINE
Payer: COMMERCIAL

## 2020-01-09 DIAGNOSIS — I10 BENIGN ESSENTIAL HYPERTENSION: ICD-10-CM

## 2020-01-09 DIAGNOSIS — R79.89 LOW SERUM TESTOSTERONE: ICD-10-CM

## 2020-01-09 DIAGNOSIS — E29.1 HYPOGONADISM MALE: ICD-10-CM

## 2020-01-09 DIAGNOSIS — Z86.73 HISTORY OF CVA (CEREBROVASCULAR ACCIDENT): ICD-10-CM

## 2020-01-09 DIAGNOSIS — D68.61 ANTIPHOSPHOLIPID ANTIBODY SYNDROME (H): ICD-10-CM

## 2020-01-09 DIAGNOSIS — Z79.01 CHRONIC ANTICOAGULATION: ICD-10-CM

## 2020-01-09 PROCEDURE — 25500064 ZZH RX 255 OP 636: Performed by: INTERNAL MEDICINE

## 2020-01-09 PROCEDURE — 70543 MRI ORBT/FAC/NCK W/O &W/DYE: CPT

## 2020-01-09 PROCEDURE — A9585 GADOBUTROL INJECTION: HCPCS | Performed by: INTERNAL MEDICINE

## 2020-01-09 RX ORDER — GADOBUTROL 604.72 MG/ML
11 INJECTION INTRAVENOUS ONCE
Status: COMPLETED | OUTPATIENT
Start: 2020-01-09 | End: 2020-01-09

## 2020-01-09 RX ADMIN — GADOBUTROL 11 ML: 604.72 INJECTION INTRAVENOUS at 20:27

## 2020-01-13 ENCOUNTER — TELEPHONE (OUTPATIENT)
Dept: FAMILY MEDICINE | Facility: CLINIC | Age: 30
End: 2020-01-13

## 2020-01-13 DIAGNOSIS — D68.61 ANTIPHOSPHOLIPID ANTIBODY SYNDROME (H): ICD-10-CM

## 2020-01-13 DIAGNOSIS — Z79.01 CHRONIC ANTICOAGULATION: ICD-10-CM

## 2020-01-13 DIAGNOSIS — Z86.711 HISTORY OF PULMONARY EMBOLISM: ICD-10-CM

## 2020-01-13 LAB
FACT X ACT/NOR PPP CHRO: 28 % (ref 70–130)
INR PPP: 2.45 (ref 0.86–1.14)

## 2020-01-13 PROCEDURE — 85610 PROTHROMBIN TIME: CPT | Performed by: FAMILY MEDICINE

## 2020-01-13 PROCEDURE — 85260 CLOT FACTOR X STUART-POWER: CPT | Performed by: FAMILY MEDICINE

## 2020-01-13 PROCEDURE — 36415 COLL VENOUS BLD VENIPUNCTURE: CPT | Performed by: FAMILY MEDICINE

## 2020-01-13 NOTE — TELEPHONE ENCOUNTER
Anticoagulation Monitoring INR Value INR Goal Range Factor X   Latest Ref Rng & Units 0.86 - 1.14  70 - 130 %   1/13/2020 2.45 (H)  28 (L)   1/6/2020 3.74 (H)  20 (L)   1/6/2020  2.0-3.0    12/23/2019   22 (L)   12/23/2019  2.0-3.0    12/17/2019 2.42 (H)  23 (L)   12/17/2019  2.0-3.0    12/10/2019 1.87 (H)  31 (L)   12/10/2019  2.0-3.0    12/3/2019 2.01 (H)  32 (L)   12/3/2019  2.0-3.0    12/2/2019 2.00 (H)  34 (L)       ANTICOAGULATION MANAGEMENT     Patient Name:  Tone Marley  Date:  1/13/2020    ASSESSMENT /SUBJECTIVE:        Patient Name:  Tone Marley  Date:  1/13/2020      Today's Factor X result of 28% is therapeutic. Goal INR of 20-40%       Warfarin dose taken: Warfarin taken as previously instructed    Diet: No new diet changes affecting INR    Medication changes/ interactions: No new medications/supplements affecting INR    Previous INR: Therapeutic     S/S of bleeding or thromboembolism: No    New injury or illness:  No    Upcoming surgery, procedure or cardioversion:  No    Additional findings: None      PLAN:    Spoke with Tone regarding INR result and instructed:     Warfarin Dosing Instructions: Continue your current warfarin dose    Instructed patient to follow up no later than: 1 week    Education provided: Saumya starkey verbalizes understanding and agrees to warfarin dosing plan.    Instructed to call the Anticoagulation Clinic for any changes, questions or concerns. (#[phone])        OBJECTIVE:  INR   Date Value Ref Range Status   01/13/2020 2.45 (H) 0.86 - 1.14 Final     Chromogenic Factor 10   Date Value Ref Range Status   01/13/2020 28 (L) 70 - 130 % Final     Comment:     Therapeutic Range:  A Chromogenic Factor 10 level of approximately 20-40%   inversely correlates with an INR of 2-3 for patients receiving Warfarin.   Chromogenic Factor 10 levels below 20% indicate an INR greater than 3 and   levels above 40% indicate an INR less than 2.       Factor 2 Assay   Date  Value Ref Range Status   2012 36 (L) 60 - 140 % Final             Anticoagulation Summary  As of 2020    INR goal:   2.0-3.0   TTR:   35.5 % (1.7 mo)   INR used for dosin.45 (2020)   Warfarin maintenance plan:   10 mg (5 mg x 2) every Mon, Thu; 12.5 mg (5 mg x 2.5) all other days   Full warfarin instructions:   10 mg every Mon, Thu; 12.5 mg all other days   Weekly warfarin total:   82.5 mg   Plan last modified:   Priscila Plasencia, [facility] (2019)   Next INR check:      Priority:   Critical   Target end date:   Indefinite    Indications    Antiphospholipid antibody syndrome (H) [D68.61]  Chronic anticoagulation [Z79.01]             Anticoagulation Episode Summary     INR check location:       Preferred lab:       Send INR reminders to:   LOUIS RIZZO    Comments:   Chromogenic Factor X (20-40%) and INR monitoring for 3 months per Roque Kidd PA-C * transition from Xarelto;  Patient gives permission to leave a detailed message.

## 2020-01-17 DIAGNOSIS — Z86.73 HISTORY OF CVA (CEREBROVASCULAR ACCIDENT): ICD-10-CM

## 2020-01-17 DIAGNOSIS — R79.89 LOW SERUM TESTOSTERONE: Primary | ICD-10-CM

## 2020-01-17 DIAGNOSIS — E29.1 HYPOGONADISM MALE: ICD-10-CM

## 2020-01-17 DIAGNOSIS — D68.61 ANTIPHOSPHOLIPID ANTIBODY SYNDROME (H): ICD-10-CM

## 2020-01-17 DIAGNOSIS — Z79.01 CHRONIC ANTICOAGULATION: ICD-10-CM

## 2020-01-17 RX ORDER — TESTOSTERONE CYPIONATE 200 MG/ML
100 INJECTION, SOLUTION INTRAMUSCULAR
Qty: 3 ML | Refills: 3 | Status: SHIPPED | OUTPATIENT
Start: 2020-01-17 | End: 2020-02-24

## 2020-01-20 ENCOUNTER — TELEPHONE (OUTPATIENT)
Dept: FAMILY MEDICINE | Facility: CLINIC | Age: 30
End: 2020-01-20

## 2020-01-20 DIAGNOSIS — D68.61 ANTIPHOSPHOLIPID ANTIBODY SYNDROME (H): ICD-10-CM

## 2020-01-20 DIAGNOSIS — Z79.01 CHRONIC ANTICOAGULATION: ICD-10-CM

## 2020-01-20 DIAGNOSIS — Z86.711 HISTORY OF PULMONARY EMBOLISM: ICD-10-CM

## 2020-01-20 LAB
FACT X ACT/NOR PPP CHRO: 31 % (ref 70–130)
INR PPP: 1.92 (ref 0.86–1.14)

## 2020-01-20 PROCEDURE — 85610 PROTHROMBIN TIME: CPT | Performed by: FAMILY MEDICINE

## 2020-01-20 PROCEDURE — 85260 CLOT FACTOR X STUART-POWER: CPT | Performed by: FAMILY MEDICINE

## 2020-01-20 PROCEDURE — 36415 COLL VENOUS BLD VENIPUNCTURE: CPT | Performed by: FAMILY MEDICINE

## 2020-01-20 NOTE — TELEPHONE ENCOUNTER
Anticoagulation Monitoring INR Value INR Goal Range Factor X   Latest Ref Rng & Units 0.86 - 1.14  70 - 130 %   1/20/2020 1.92 (H)  31 (L)   1/13/2020 2.45 (H)  28 (L)   1/13/2020  2.0-3.0    1/6/2020 3.74 (H)  20 (L)   1/6/2020  2.0-3.0    12/23/2019   22 (L)   12/23/2019  2.0-3.0    12/17/2019 2.42 (H)  23 (L)   12/17/2019  2.0-3.0    12/10/2019 1.87 (H)  31 (L)   12/10/2019  2.0-3.0    12/3/2019 2.01 (H)  32 (L)   12/3/2019  2.0-3.0    12/2/2019 2.00 (H)  34 (L)   12/2/2019  2.0-3.0    11/29/2019 1.72 (H)  41 (L)     ANTICOAGULATION MANAGEMENT     Patient Name:  Tone Marley  Date:  1/20/2020    ASSESSMENT /SUBJECTIVE:      Today's Factor 10 is 31% and is therapeutic. Goal range is 20-40%      Warfarin dose taken: Warfarin taken as previously instructed    Diet: No new diet changes affecting INR    Medication changes/ interactions: Will be starting 100 mg of Testosterone Bi-weekly 1/21    Previous Factor 10:  Therapeutic     S/S of bleeding or thromboembolism: No    New injury or illness:  No    Upcoming surgery, procedure or cardioversion:  No  Additional findings: Testosterone Injections (100 mg) starting tomorrow (1/21/20).  Every 2 weeks injections per Dr. Zuleta.    PLAN:    Spoke with Tone regarding INR result and instructed:     Warfarin Dosing Instructions: Continue your current warfarin dose    Instructed patient to follow up no later than: 1 week  Lab visit scheduled    Education provided: Yes: Interactions with INR/Wafarin and signs and symptoms to watch for.    Tone,  verbalizes understanding and agrees to warfarin dosing plan.    Instructed to call the Anticoagulation Clinic for any changes, questions or concerns. (#[phone])        OBJECTIVE:  INR   Date Value Ref Range Status   01/20/2020 1.92 (H) 0.86 - 1.14 Final     Chromogenic Factor 10   Date Value Ref Range Status   01/20/2020 31 (L) 70 - 130 % Final     Comment:     Therapeutic Range:  A Chromogenic Factor 10 level of  approximately 20-40%   inversely correlates with an INR of 2-3 for patients receiving Warfarin.   Chromogenic Factor 10 levels below 20% indicate an INR greater than 3 and   levels above 40% indicate an INR less than 2.       Factor 2 Assay   Date Value Ref Range Status   2012 36 (L) 60 - 140 % Final             Anticoagulation Summary  As of 2020    INR goal:   2.0-3.0   TTR:   41.3 % (2 mo)   INR used for dosin.92! (2020)   Warfarin maintenance plan:   10 mg (5 mg x 2) every Mon, Thu; 12.5 mg (5 mg x 2.5) all other days   Full warfarin instructions:   10 mg every Mon, Thu; 12.5 mg all other days   Weekly warfarin total:   82.5 mg   No change documented:   Fiordaliza Hatch RN   Plan last modified:   Priscila Plasencia, [facility] (2019)   Next INR check:      Priority:   Critical   Target end date:   Indefinite    Indications    Antiphospholipid antibody syndrome (H) [D68.61]  Chronic anticoagulation [Z79.01]             Anticoagulation Episode Summary     INR check location:       Preferred lab:       Send INR reminders to:   LOUIS RIZZO    Comments:   Chromogenic Factor X (20-40%) and INR monitoring for 3 months per Roque Kidd PA-C * transition from Xarelto;  Patient gives permission to leave a detailed message.

## 2020-01-27 ENCOUNTER — TELEPHONE (OUTPATIENT)
Dept: FAMILY MEDICINE | Facility: CLINIC | Age: 30
End: 2020-01-27

## 2020-01-27 DIAGNOSIS — Z86.711 HISTORY OF PULMONARY EMBOLISM: ICD-10-CM

## 2020-01-27 DIAGNOSIS — D68.61 ANTIPHOSPHOLIPID ANTIBODY SYNDROME (H): ICD-10-CM

## 2020-01-27 DIAGNOSIS — Z79.01 CHRONIC ANTICOAGULATION: ICD-10-CM

## 2020-01-27 LAB — INR PPP: 1.8 (ref 0.86–1.14)

## 2020-01-27 PROCEDURE — 36415 COLL VENOUS BLD VENIPUNCTURE: CPT | Performed by: FAMILY MEDICINE

## 2020-01-27 PROCEDURE — 85610 PROTHROMBIN TIME: CPT | Performed by: FAMILY MEDICINE

## 2020-01-27 PROCEDURE — 85260 CLOT FACTOR X STUART-POWER: CPT | Performed by: FAMILY MEDICINE

## 2020-01-27 NOTE — TELEPHONE ENCOUNTER
INR result 1.80 today. Awaiting Factor 10 results for dosing.    Paulette Medina RN on 1/27/2020 at 1:48 PM

## 2020-01-27 NOTE — TELEPHONE ENCOUNTER
Still awaiting CF10 result. Contacted pt and advised him to take his usual 10mg dose of warfarin tonight. ACC to follow up tomorrow.    Paulette Medina RN on 1/27/2020 at 5:31 PM

## 2020-01-28 LAB — FACT X ACT/NOR PPP CHRO: 37 % (ref 70–130)

## 2020-01-28 NOTE — TELEPHONE ENCOUNTER
Anticoagulation Monitoring Factor X   Latest Ref Rng & Units 70 - 130 %   1/27/2020 37 (L)   1/27/2020 1/20/2020 31 (L)   1/20/2020 1/13/2020 28 (L)   1/13/2020 1/6/2020 20 (L)   1/6/2020 12/23/2019 22 (L)   12/23/2019 12/17/2019 23 (L)     Anticoagulation Management    Unable to reach Tone today.    Today's CF10 result of 37% is therapeutic (goal INR of 40%-20%).  Result received from: Clinic Lab    Follow up required to confirm warfarin dose taken and assess for changes    Left message to continue current dosing of 10mg Mon/Thurs; 12.5mg all other days. Requested pt contact [facility] INR clinic to schedule lab appt for next CF10 check in one week (pt likes to test weekly).    Anticoagulation clinic to follow up    Paulette Medina RN

## 2020-01-28 NOTE — TELEPHONE ENCOUNTER
Pt returned call and lab was scheduled for INR/CF10.    Paulette Medina RN on 1/28/2020 at 4:20 PM

## 2020-02-03 ENCOUNTER — TELEPHONE (OUTPATIENT)
Dept: FAMILY MEDICINE | Facility: CLINIC | Age: 30
End: 2020-02-03

## 2020-02-03 DIAGNOSIS — D68.61 ANTIPHOSPHOLIPID ANTIBODY SYNDROME (H): ICD-10-CM

## 2020-02-03 DIAGNOSIS — Z79.01 CHRONIC ANTICOAGULATION: ICD-10-CM

## 2020-02-03 DIAGNOSIS — Z86.711 HISTORY OF PULMONARY EMBOLISM: ICD-10-CM

## 2020-02-03 LAB
FACT X ACT/NOR PPP CHRO: 30 % (ref 70–130)
INR PPP: 2.47 (ref 0.86–1.14)

## 2020-02-03 PROCEDURE — 85610 PROTHROMBIN TIME: CPT | Performed by: FAMILY MEDICINE

## 2020-02-03 PROCEDURE — 85260 CLOT FACTOR X STUART-POWER: CPT | Performed by: FAMILY MEDICINE

## 2020-02-03 PROCEDURE — 36415 COLL VENOUS BLD VENIPUNCTURE: CPT | Performed by: FAMILY MEDICINE

## 2020-02-03 NOTE — TELEPHONE ENCOUNTER
Anticoagulation Monitoring INR Value INR Goal Range Factor X   Latest Ref Rng & Units 0.86 - 1.14  70 - 130 %   2/3/2020 2.47 (H)  30 (L)   1/27/2020 1.80 (H)  37 (L)   1/27/2020  2.0-3.0    1/20/2020 1.92 (H)  31 (L)   1/20/2020  2.0-3.0    1/13/2020 2.45 (H)  28 (L)   1/13/2020  2.0-3.0    1/6/2020 3.74 (H)  20 (L)   1/6/2020  2.0-3.0    12/23/2019   22 (L)   12/23/2019  2.0-3.0    12/17/2019 2.42 (H)  23 (L)   12/17/2019  2.0-3.0    12/10/2019 1.87 (H)  31 (L)   12/10/2019  2.0-3.0    12/3/2019 2.01 (H)  32 (L)   12/3/2019  2.0-3.0    12/2/2019 2.00 (H)  34 (L)   12/2/2019  2.0-3.0    11/29/2019 1.72 (H)  41 (L)       ANTICOAGULATION  MANAGEMENT    Assessment     Therapeutic factor 10 of 30% . Goal range 20-40%. Received result via clinic lab.       Plan     Continue current coumadin dose of 10 mg Mon, Thu, 12.5 mg all other days and recheck factor 10 in 1 week.    ?       OBJECTIVE    INR   Date Value Ref Range Status   02/03/2020 2.47 (H) 0.86 - 1.14 Final     Chromogenic Factor 10   Date Value Ref Range Status   02/03/2020 30 (L) 70 - 130 % Final     Comment:     Therapeutic Range:  A Chromogenic Factor 10 level of approximately 20-40%   inversely correlates with an INR of 2-3 for patients receiving Warfarin.   Chromogenic Factor 10 levels below 20% indicate an INR greater than 3 and   levels above 40% indicate an INR less than 2.       Factor 2 Assay   Date Value Ref Range Status   02/13/2012 36 (L) 60 - 140 % Final       ASSESSMENT / PLAN      Anticoagulation Summary  As of 2/3/2020    INR goal:   2.0-3.0   TTR:   40.2 % (2.4 mo)   INR used for dosing:   No new INR was available at the time of this encounter.   Warfarin maintenance plan:   10 mg (5 mg x 2) every Mon, Thu; 12.5 mg (5 mg x 2.5) all other days   Full warfarin instructions:   10 mg every Mon, Thu; 12.5 mg all other days   Weekly warfarin total:   82.5 mg   No change documented:   Fiordaliza Hatch, RN   Plan last modified:   Priscila Plasencia, [facility]  (11/29/2019)   Next INR check:   2/10/2020   Priority:   Maintenance   Target end date:   Indefinite    Indications    Antiphospholipid antibody syndrome (H) [D68.61]  Chronic anticoagulation [Z79.01]             Anticoagulation Episode Summary     INR check location:       Preferred lab:       Send INR reminders to:   LOUIS RIZZO    Comments:   Chromogenic Factor X (20-40%) and INR monitoring for 3 months per Roque Kidd PA-C * transition from Xarelto;  Patient gives permission to leave a detailed message.

## 2020-02-10 ENCOUNTER — TELEPHONE (OUTPATIENT)
Dept: FAMILY MEDICINE | Facility: CLINIC | Age: 30
End: 2020-02-10

## 2020-02-10 DIAGNOSIS — E29.1 HYPOGONADISM MALE: ICD-10-CM

## 2020-02-10 DIAGNOSIS — R79.89 LOW SERUM TESTOSTERONE: ICD-10-CM

## 2020-02-10 DIAGNOSIS — Z79.01 CHRONIC ANTICOAGULATION: ICD-10-CM

## 2020-02-10 DIAGNOSIS — Z86.711 HISTORY OF PULMONARY EMBOLISM: ICD-10-CM

## 2020-02-10 DIAGNOSIS — Z86.73 HISTORY OF CVA (CEREBROVASCULAR ACCIDENT): ICD-10-CM

## 2020-02-10 DIAGNOSIS — R82.998 DARK URINE: ICD-10-CM

## 2020-02-10 DIAGNOSIS — D68.61 ANTIPHOSPHOLIPID ANTIBODY SYNDROME (H): ICD-10-CM

## 2020-02-10 LAB
ERYTHROCYTE [DISTWIDTH] IN BLOOD BY AUTOMATED COUNT: 13.7 % (ref 10–15)
HCT VFR BLD AUTO: 42.3 % (ref 40–53)
HGB BLD-MCNC: 14.3 G/DL (ref 13.3–17.7)
INR PPP: 2.23 (ref 0.86–1.14)
MCH RBC QN AUTO: 29.4 PG (ref 26.5–33)
MCHC RBC AUTO-ENTMCNC: 33.8 G/DL (ref 31.5–36.5)
MCV RBC AUTO: 87 FL (ref 78–100)
PLATELET # BLD AUTO: 189 10E9/L (ref 150–450)
RBC # BLD AUTO: 4.86 10E12/L (ref 4.4–5.9)
WBC # BLD AUTO: 3.5 10E9/L (ref 4–11)

## 2020-02-10 PROCEDURE — 85260 CLOT FACTOR X STUART-POWER: CPT | Performed by: INTERNAL MEDICINE

## 2020-02-10 PROCEDURE — 36415 COLL VENOUS BLD VENIPUNCTURE: CPT | Performed by: INTERNAL MEDICINE

## 2020-02-10 PROCEDURE — 85610 PROTHROMBIN TIME: CPT | Performed by: INTERNAL MEDICINE

## 2020-02-10 PROCEDURE — 85027 COMPLETE CBC AUTOMATED: CPT | Performed by: INTERNAL MEDICINE

## 2020-02-10 PROCEDURE — 84270 ASSAY OF SEX HORMONE GLOBUL: CPT | Performed by: INTERNAL MEDICINE

## 2020-02-10 PROCEDURE — 84403 ASSAY OF TOTAL TESTOSTERONE: CPT | Performed by: INTERNAL MEDICINE

## 2020-02-10 NOTE — TELEPHONE ENCOUNTER
Patient INR today 2.47, awaiting Factor 10 result, still in process.  Will address when this is received.    Tana Iqbal, [facility]  Anticoagulation Clinic

## 2020-02-11 LAB — FACT X ACT/NOR PPP CHRO: 28 % (ref 70–130)

## 2020-02-11 NOTE — TELEPHONE ENCOUNTER
Anticoagulation Monitoring Factor X   Latest Ref Rng & Units 70 - 130 %   2/10/2020 28 (L)   2/3/2020 30 (L)   2/3/2020    2020 37 (L)   2020 31 (L)   2020 28 (L)       ANTICOAGULATION  MANAGEMENT- factor 10    Assessment     Therapeutic factor 10 = 28% . Goal range is 20-40%. Received via clinic lab.         Previous INR was therapeutic    Tone was contacted today with results.     Plan     Patient will continue current coumadin dose of 10 mg Mon, Thu, and 12.5 mg all other days and recheck in 1 week.    OBJECTIVE    INR   Date Value Ref Range Status   02/10/2020 2.23 (H) 0.86 - 1.14 Final     Chromogenic Factor 10   Date Value Ref Range Status   02/10/2020 28 (L) 70 - 130 % Final     Comment:     Therapeutic Range:  A Chromogenic Factor 10 level of approximately 20-40%   inversely correlates with an INR of 2-3 for patients receiving Warfarin.   Chromogenic Factor 10 levels below 20% indicate an INR greater than 3 and   levels above 40% indicate an INR less than 2.       Factor 2 Assay   Date Value Ref Range Status   2012 36 (L) 60 - 140 % Final       ASSESSMENT / PLAN      Anticoagulation Summary  As of 2/10/2020    INR goal:   2.0-3.0   TTR:   45.4 % (2.7 mo)   INR used for dosin.23 (2/10/2020)   Warfarin maintenance plan:   10 mg (5 mg x 2) every Mon, Thu; 12.5 mg (5 mg x 2.5) all other days   Full warfarin instructions:   10 mg every Mon, Thu; 12.5 mg all other days   Weekly warfarin total:   82.5 mg   No change documented:   Fiordaliza Hatch, RN   Plan last modified:   Priscila Plasencia, [facility] (2019)   Next INR check:   2020   Priority:   Maintenance   Target end date:   Indefinite    Indications    Antiphospholipid antibody syndrome (H) [D68.61]  Chronic anticoagulation [Z79.01]             Anticoagulation Episode Summary     INR check location:       Preferred lab:       Send INR reminders to:   LOUIS RIZZO    Comments:   Chromogenic Factor X  (20-40%) and INR monitoring for 3 months per Roque Kidd PA-C * transition from Xarelto;  Patient gives permission to leave a detailed message.

## 2020-02-12 LAB
SHBG SERPL-SCNC: 13 NMOL/L (ref 11–80)
TESTOST FREE SERPL-MCNC: 10.06 NG/DL (ref 4.7–24.4)
TESTOST SERPL-MCNC: 333 NG/DL (ref 240–950)

## 2020-02-24 ENCOUNTER — OFFICE VISIT (OUTPATIENT)
Dept: ENDOCRINOLOGY | Facility: CLINIC | Age: 30
End: 2020-02-24
Payer: COMMERCIAL

## 2020-02-24 ENCOUNTER — HEALTH MAINTENANCE LETTER (OUTPATIENT)
Age: 30
End: 2020-02-24

## 2020-02-24 ENCOUNTER — TELEPHONE (OUTPATIENT)
Dept: FAMILY MEDICINE | Facility: CLINIC | Age: 30
End: 2020-02-24

## 2020-02-24 VITALS
SYSTOLIC BLOOD PRESSURE: 147 MMHG | WEIGHT: 260 LBS | HEART RATE: 66 BPM | HEIGHT: 71 IN | DIASTOLIC BLOOD PRESSURE: 90 MMHG | BODY MASS INDEX: 36.4 KG/M2

## 2020-02-24 DIAGNOSIS — E29.1 HYPOGONADISM MALE: ICD-10-CM

## 2020-02-24 DIAGNOSIS — D68.61 ANTIPHOSPHOLIPID ANTIBODY SYNDROME (H): ICD-10-CM

## 2020-02-24 DIAGNOSIS — Z79.01 CHRONIC ANTICOAGULATION: ICD-10-CM

## 2020-02-24 DIAGNOSIS — Z86.711 HISTORY OF PULMONARY EMBOLISM: ICD-10-CM

## 2020-02-24 DIAGNOSIS — R79.89 LOW SERUM TESTOSTERONE: ICD-10-CM

## 2020-02-24 LAB — INR PPP: 3.09 (ref 0.86–1.14)

## 2020-02-24 PROCEDURE — 85260 CLOT FACTOR X STUART-POWER: CPT | Performed by: FAMILY MEDICINE

## 2020-02-24 PROCEDURE — 36415 COLL VENOUS BLD VENIPUNCTURE: CPT | Performed by: FAMILY MEDICINE

## 2020-02-24 PROCEDURE — 85610 PROTHROMBIN TIME: CPT | Performed by: FAMILY MEDICINE

## 2020-02-24 RX ORDER — TESTOSTERONE CYPIONATE 200 MG/ML
200 INJECTION, SOLUTION INTRAMUSCULAR
Qty: 2 ML | Refills: 5 | Status: SHIPPED | OUTPATIENT
Start: 2020-02-24 | End: 2020-03-25

## 2020-02-24 ASSESSMENT — MIFFLIN-ST. JEOR: SCORE: 2156.48

## 2020-02-24 ASSESSMENT — PAIN SCALES - GENERAL: PAINLEVEL: NO PAIN (0)

## 2020-02-24 NOTE — LETTER
2/24/2020       RE: Tone Marley  [address] [place] [facility] [address]     Dear Colleague,    Thank you for referring your patient, Tone Marley, to the [facility] ENDOCRINOLOGY at [facility]. Please see a copy of my visit note below.    Reason for visit/consult:  Secondary Hypogonadism     Primary care provider: Yara Faust     HPI:  HPI: Tone Marley is a 29 y.o. male seen for f/u on secondary hypogonadism. I last saw him on 10/28/19 and labs were drawn and brain imaging and he was started on testosterone injections. He is taking 100 mg q 14 days. He does not have any complaints today. Had higher energy levels with testosterone replacement.  Always had low libido (more recently within past 1-2 yrs), had low energy, hardest time losing weight, puts in 2 hrs at gym 2-4 times weekly, using [employer] trying to stay under 2000 calories daily, low CHO diet. No kids. Unknown fertility (uses protection) - procreation has not been attempted. Doesn't last as long as he used to (reduced erection time).   He also takes coumadin. 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    Past Medical/Surgical History:  Past Medical History:   Diagnosis Date     ADHD (attention deficit hyperactivity disorder) 6/27/2012     Antiphospholipid antibody syndromes     at age 13     Antiphospholipid syndrome (H) 2/2/2012    On anticoagulation coumdin 10 mg once daily       History of pulmonary  "embolism 9/2/2013    Was diagnosed  Positive CT scan in emergency department   now Under care of Dr Turcios at  at [place], [facility],[id number] His nurse personal line [phone] Was on lovenox for 5 months  Now  For past one month on xareltlo 20 mg once daily and plaqunil twice daily       Hyperlipidemia LDL goal <130 6/11/2013     Hypertension goal BP (blood pressure) < 140/90 6/19/12     Hypertension, uncontrolled 6/27/2012     Prehypertension 2/2/2012     Pulmonary embolism (H)     at age 13     Past Surgical History:   Procedure Laterality Date     wisdom teeth extraction         Allergies:  Allergies   Allergen Reactions     Nkda [No Known Drug Allergies]        Current Medications   Current Outpatient Medications   Medication     ASPIRIN PO     atorvastatin (LIPITOR) 10 MG tablet     COMPRESSION STOCKINGS     hydrochlorothiazide (HYDRODIURIL) 25 MG tablet     losartan (COZAAR) 100 MG tablet     Omega-3 Fatty Acids (FISH OIL PO)     testosterone cypionate (DEPOTESTOSTERONE) 200 MG/ML injection     warfarin ANTICOAGULANT (COUMADIN) 10 MG tablet     No current facility-administered medications for this visit.        Family History:  Family History   Problem Relation Age of Onset     Asthma Mother      Family History Negative Father      Heart Surgery Father      C.A.D. Paternal Uncle 42       Social History:  Social History     Tobacco Use     Smoking status: Never Smoker     Smokeless tobacco: Former User     Types: Chew     Tobacco comment: Chew maybe once per week.   Substance Use Topics     Alcohol use: No     Alcohol/week: 0.0 standard drinks     Comment:  quit post stroke 7-2018     ROS:  negative except as mentioned in HPI    Exam  Blood pressure (!) 147/90, pulse 66, height 1.803 m (5' 11\"), weight 117.9 kg (260 lb).  Gen: well appearing, nad, pleasant and conversant  HEENT: anicteric, EOMI, no proptosis or lid lag, no goiter  Neuro: A&Ox3    Labs/Imaging    ENDO PITUITARY LABS-[facility] Latest Ref Rng & Units " 2/10/2020 1/6/2020   TSH 0.40 - 4.00 mU/L     PROLACTIN 2 - 18 ug/L  25 (H)   PROLACTIN 2.1 - 17.7 ng/mL  8.4   CORTISOL, SERUM 4 - 22 ug/dL     ADRENAL CORTICOTROPIN <47 pg/mL     FSH 0.7 - 10.8 IU/L  2.8   LUTROPIN 1.5 - 9.3 IU/L  3.8   TESTOSTERONE TOTAL 240 - 950 ng/dL 333 203 (L)   PSA 0 - 4 ug/L      - 144 mmol/L     POTASSIUM 3.4 - 5.3 mmol/L     HUMAN GROWTH HORMONE <1.3 ug/L  <0.1   INS GROWTH FACTOR 1 82 - 244 ng/ml  167   GLUCOSE 70 - 99 mg/dL         ENDO RESULTS Latest Ref Rng & Units 10/30/2019   TSH 0.40 - 4.00 mU/L 4.44 (H)   FSH 0.7 - 10.8 IU/L 2.8   PROLACTIN 2 - 18 ug/L 25 (H)   ADRENAL CORTICOTROPIN <47 pg/mL 48 (H)   T4 FREE 0.76 - 1.46 ng/dL 1.03   CALCIUM 8.5 - 10.1 mg/dL      Low Testosterone Total 200 (240-950) on 8/20/19 at 9:28am, 164 on 8/7/18 at 11:15 am, 124 on 3/8/18 at 7:47 am    Assessment and Plan    Secondary Hypogonadism from 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 Increase from 100 to 200 mg IM q 2 weeks. Recheck levels after dose increase.    RTC: annually, correspond via [employer]    Starla Zuleta MD, MPH  Attending Physician  Diabetes/Endocrinology/Metabolism    Time: 25 min spent on evaluation, management, counseling, & education with greater than 50% of the total time was spent on counseling and coordinating care      Answers for HPI/ROS submitted by the patient on 2/18/2020   General Symptoms: No  Skin Symptoms: No  HENT Symptoms: No  EYE SYMPTOMS: No  HEART SYMPTOMS: No  LUNG SYMPTOMS: No  INTESTINAL SYMPTOMS: No  URINARY SYMPTOMS: No  REPRODUCTIVE SYMPTOMS: No  SKELETAL SYMPTOMS: No  BLOOD SYMPTOMS: No  NERVOUS SYSTEM SYMPTOMS: No  MENTAL HEALTH SYMPTOMS: No

## 2020-02-24 NOTE — TELEPHONE ENCOUNTER
Anticoagulation Management    Unable to reach Tone today.    Awaiting CF10 result for dosing.    Left message for pt to take his usual dose of 10mg of warfarin tonight.    Left message to continue current dose of warfarin 10 mg tonight.    Anticoagulation clinic to follow up    Paulette Medina RN

## 2020-02-24 NOTE — TELEPHONE ENCOUNTER
Patient is dosed off of factor 10, awaiting on results.    Tana Iqbal RN  Anticoagulation Clinic

## 2020-02-25 ENCOUNTER — TELEPHONE (OUTPATIENT)
Dept: FAMILY MEDICINE | Facility: CLINIC | Age: 30
End: 2020-02-25

## 2020-02-25 DIAGNOSIS — D68.61 ANTIPHOSPHOLIPID ANTIBODY SYNDROME (H): ICD-10-CM

## 2020-02-25 DIAGNOSIS — Z79.01 CHRONIC ANTICOAGULATION: ICD-10-CM

## 2020-02-25 LAB — FACT X ACT/NOR PPP CHRO: 28 % (ref 70–130)

## 2020-02-25 NOTE — PROGRESS NOTES
Reason for visit/consult: Secondary Hypogonadism     Primary care provider: Yara Faust     HPI:  HPI: Tone Marley is a 29 y.o. male seen for f/u on secondary hypogonadism. I last saw him on 10/28/19 and labs were drawn and brain imaging and he was started on testosterone injections. He is taking 100 mg q 14 days. He does not have any complaints today. Had higher energy levels with testosterone replacement.  Always had low libido (more recently within past 1-2 yrs), had low energy, hardest time losing weight, puts in 2 hrs at gym 2-4 times weekly, using [employer] trying to stay under 2000 calories daily, low CHO diet. No kids. Unknown fertility (uses protection) - procreation has not been attempted. Doesn't last as long as he used to (reduced erection time).   He also takes coumadin. 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    Past Medical/Surgical History:  Past Medical History:   Diagnosis Date     ADHD (attention deficit hyperactivity disorder) 6/27/2012     Antiphospholipid antibody syndromes     at age 13     Antiphospholipid syndrome (H) 2/2/2012    On anticoagulation coumdin 10 mg once daily       History of pulmonary embolism 9/2/2013    Was diagnosed  Positive CT scan in emergency department   now Under care of Dr Turcios at [facility] at [facility],[id number] His nurse personal line [phone] Was on lovenox for 5 months  Now  For past one month on xareltlo 20 mg once daily and plaqunil twice daily        "Hyperlipidemia LDL goal <130 6/11/2013     Hypertension goal BP (blood pressure) < 140/90 6/19/12     Hypertension, uncontrolled 6/27/2012     Prehypertension 2/2/2012     Pulmonary embolism (H)     at age 13     Past Surgical History:   Procedure Laterality Date     wisdom teeth extraction         Allergies:  Allergies   Allergen Reactions     Nkda [No Known Drug Allergies]        Current Medications   Current Outpatient Medications   Medication     ASPIRIN PO     atorvastatin (LIPITOR) 10 MG tablet     COMPRESSION STOCKINGS     hydrochlorothiazide (HYDRODIURIL) 25 MG tablet     losartan (COZAAR) 100 MG tablet     Omega-3 Fatty Acids (FISH OIL PO)     testosterone cypionate (DEPOTESTOSTERONE) 200 MG/ML injection     warfarin ANTICOAGULANT (COUMADIN) 10 MG tablet     No current facility-administered medications for this visit.        Family History:  Family History   Problem Relation Age of Onset     Asthma Mother      Family History Negative Father      Heart Surgery Father      C.A.D. Paternal Uncle 42       Social History:  Social History     Tobacco Use     Smoking status: Never Smoker     Smokeless tobacco: Former User     Types: Chew     Tobacco comment: Chew maybe once per week.   Substance Use Topics     Alcohol use: No     Alcohol/week: 0.0 standard drinks     Comment:  quit post stroke 7-2018     ROS:  negative except as mentioned in HPI    Exam  Blood pressure (!) 147/90, pulse 66, height 1.803 m (5' 11\"), weight 117.9 kg (260 lb).  Gen: well appearing, nad, pleasant and conversant  HEENT: anicteric, EOMI, no proptosis or lid lag, no goiter  Neuro: A&Ox3    Labs/Imaging    ENDO PITUITARY LABS-UMP Latest Ref Rng & Units 2/10/2020 1/6/2020   TSH 0.40 - 4.00 mU/L     PROLACTIN 2 - 18 ug/L  25 (H)   PROLACTIN 2.1 - 17.7 ng/mL  8.4   CORTISOL, SERUM 4 - 22 ug/dL     ADRENAL CORTICOTROPIN <47 pg/mL     FSH 0.7 - 10.8 IU/L  2.8   LUTROPIN 1.5 - 9.3 IU/L  3.8   TESTOSTERONE TOTAL 240 - 950 ng/dL 333 203 (L)   PSA " 0 - 4 ug/L      - 144 mmol/L     POTASSIUM 3.4 - 5.3 mmol/L     HUMAN GROWTH HORMONE <1.3 ug/L  <0.1   INS GROWTH FACTOR 1 82 - 244 ng/ml  167   GLUCOSE 70 - 99 mg/dL         ENDO RESULTS Latest Ref Rng & Units 10/30/2019   TSH 0.40 - 4.00 mU/L 4.44 (H)   FSH 0.7 - 10.8 IU/L 2.8   PROLACTIN 2 - 18 ug/L 25 (H)   ADRENAL CORTICOTROPIN <47 pg/mL 48 (H)   T4 FREE 0.76 - 1.46 ng/dL 1.03   CALCIUM 8.5 - 10.1 mg/dL      Low Testosterone Total 200 (240-950) on 8/20/19 at 9:28am, 164 on 8/7/18 at 11:15 am, 124 on 3/8/18 at 7:47 am    Assessment and Plan    Secondary Hypogonadism from 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 Increase from 100 to 200 mg IM q 2 weeks. Recheck levels after dose increase.    RTC: annually, correspond via [employer]    Starla Zuleta MD, MPH  Attending Physician  Diabetes/Endocrinology/Metabolism    Time: 25 min spent on evaluation, management, counseling, & education with greater than 50% of the total time was spent on counseling and coordinating care    Dr. Starla Zuleta MD, MPH  Endocrinologist      Answers for HPI/ROS submitted by the patient on 2/18/2020   General Symptoms: No  Skin Symptoms: No  HENT Symptoms: No  EYE SYMPTOMS: No  HEART SYMPTOMS: No  LUNG SYMPTOMS: No  INTESTINAL SYMPTOMS: No  URINARY SYMPTOMS: No  REPRODUCTIVE SYMPTOMS: No  SKELETAL SYMPTOMS: No  BLOOD SYMPTOMS: No  NERVOUS SYSTEM SYMPTOMS: No  MENTAL HEALTH SYMPTOMS: No

## 2020-02-25 NOTE — TELEPHONE ENCOUNTER
Patient left a message that he did receive message of result not yet back. He will continue his current dose until hears other wise. He also wants to make next appt for next Monday at around 0830 [facility]. He will await call with results.

## 2020-02-25 NOTE — TELEPHONE ENCOUNTER
Component      Latest Ref Rng & Units 1/13/2020 1/20/2020 1/27/2020 2/3/2020   INR      0.86 - 1.14 2.45 (H) 1.92 (H) 1.80 (H) 2.47 (H)   Chromogenic Factor 10      70 - 130 % 28 (L) 31 (L) 37 (L) 30 (L)     Component      Latest Ref Rng & Units 2/10/2020 2/24/2020   INR      0.86 - 1.14 2.23 (H) 3.09 (H)   Chromogenic Factor 10      70 - 130 % 28 (L) 28 (L)     ANTICOAGULATION MANAGEMENT     Patient Name:  Tone Marley  Date:  2/25/2020    ASSESSMENT /SUBJECTIVE:      Today's Factor X  result of 28 is therapeutic. Goal INR of 20-40%    PLAN:    Left detailed message for Tone regarding INR result and instructed:     Warfarin Dosing Instructions: Continue your current warfarin dose    Instructed patient to follow up no later than: 2 weeks  Left detailed message with recommended recheck date    Education provided: No        Instructed to call the Anticoagulation Clinic for any changes, questions or concerns. (#[phone])        OBJECTIVE:  INR   Date Value Ref Range Status   02/24/2020 3.09 (H) 0.86 - 1.14 Final     Chromogenic Factor 10   Date Value Ref Range Status   02/24/2020 28 (L) 70 - 130 % Final     Comment:     Therapeutic Range:  A Chromogenic Factor 10 level of approximately 20-40%   inversely correlates with an INR of 2-3 for patients receiving Warfarin.   Chromogenic Factor 10 levels below 20% indicate an INR greater than 3 and   levels above 40% indicate an INR less than 2.       Factor 2 Assay   Date Value Ref Range Status   02/13/2012 36 (L) 60 - 140 % Final             Anticoagulation Summary  As of 2/25/2020    INR goal:   2.0-3.0   TTR:   51.9 % (3.1 mo)   INR used for dosing:   No new INR was available at the time of this encounter.   Warfarin maintenance plan:   10 mg (5 mg x 2) every Mon, Thu; 12.5 mg (5 mg x 2.5) all other days   Full warfarin instructions:   10 mg every Mon, Thu; 12.5 mg all other days   Weekly warfarin total:   82.5 mg   Plan last modified:   Priscila Plasencia, [facility]  (11/29/2019)   Next INR check:   3/9/2020   Priority:   Maintenance   Target end date:   Indefinite    Indications    Antiphospholipid antibody syndrome (H) [D68.61]  Chronic anticoagulation [Z79.01]             Anticoagulation Episode Summary     INR check location:       Preferred lab:       Send INR reminders to:   LOUIS RIZZO    Comments:   Chromogenic Factor X (20-40%) and INR monitoring for 3 months per Roque Kidd PA-C * transition from Xarelto;  Patient gives permission to leave a detailed message.

## 2020-03-02 ENCOUNTER — TELEPHONE (OUTPATIENT)
Dept: FAMILY MEDICINE | Facility: CLINIC | Age: 30
End: 2020-03-02

## 2020-03-02 DIAGNOSIS — D68.61 ANTIPHOSPHOLIPID ANTIBODY SYNDROME (H): ICD-10-CM

## 2020-03-02 DIAGNOSIS — Z86.711 HISTORY OF PULMONARY EMBOLISM: ICD-10-CM

## 2020-03-02 DIAGNOSIS — Z79.01 CHRONIC ANTICOAGULATION: ICD-10-CM

## 2020-03-02 LAB
FACT X ACT/NOR PPP CHRO: 28 % (ref 70–130)
INR PPP: 2.3 (ref 0.86–1.14)

## 2020-03-02 PROCEDURE — 36415 COLL VENOUS BLD VENIPUNCTURE: CPT | Performed by: FAMILY MEDICINE

## 2020-03-02 PROCEDURE — 85260 CLOT FACTOR X STUART-POWER: CPT | Performed by: FAMILY MEDICINE

## 2020-03-02 PROCEDURE — 85610 PROTHROMBIN TIME: CPT | Performed by: FAMILY MEDICINE

## 2020-03-02 NOTE — TELEPHONE ENCOUNTER
Anticoagulation Monitoring INR Value INR Goal Range Factor X   Latest Ref Rng & Units 0.86 - 1.14  70 - 130 %   3/2/2020 2.30 (H)  28 (L)   2/25/2020  2.0-3.0    2/24/2020 3.09 (H)  28 (L)   2/24/2020  2.0-3.0    2/10/2020 2.23 (H)  28 (L)   2/10/2020  2.0-3.0    2/3/2020 2.47 (H)  30 (L)   2/3/2020  2.0-3.0    1/27/2020 1.80 (H)  37 (L)   1/27/2020  2.0-3.0    1/20/2020 1.92 (H)  31 (L)     ANTICOAGULATION MANAGEMENT     Patient Name:  Tone Marley  Date:  3/2/2020    ASSESSMENT /SUBJECTIVE:    Today's CF10 result of 28% is therapeutic. Goal CF10 of 20%-40%      Warfarin dose taken: Warfarin taken as previously instructed    Diet: No new diet changes affecting CF10    Medication changes/ interactions: Potential interaction between increase in testosterone dose and warfarin which may affect subsequent [id number] of testosterone increasing to 200mg every 2 weeks beginning 3/3/20    Previous CF10: Therapeutic     S/S of bleeding or thromboembolism: No    New injury or illness:  No    Upcoming surgery, procedure or cardioversion:  No    Additional findings: None    PLAN:    Spoke with Tone regarding INR result and instructed:     Warfarin Dosing Instructions: Continue your current warfarin dose of 10mg Mon/Thurs; 12.5mg all other days    Instructed patient to follow up no later than: 1 week  Lab visit scheduled    Education provided: Yes: Potential interaction between testosterone dosage increase and warfarin; may increase bleeding risk and affect CF10 result    Tone verbalizes understanding and agrees to warfarin dosing plan.    Instructed to call the Anticoagulation Clinic for any changes, questions or concerns. (#[phone])        OBJECTIVE:  INR   Date Value Ref Range Status   03/02/2020 2.30 (H) 0.86 - 1.14 Final     Chromogenic Factor 10   Date Value Ref Range Status   03/02/2020 28 (L) 70 - 130 % Final     Comment:     Therapeutic Range:  A Chromogenic Factor 10 level of approximately 20-40%    inversely correlates with an INR of 2-3 for patients receiving Warfarin.   Chromogenic Factor 10 levels below 20% indicate an INR greater than 3 and   levels above 40% indicate an INR less than 2.       Factor 2 Assay   Date Value Ref Range Status   02/13/2012 36 (L) 60 - 140 % Final             Anticoagulation Summary  As of 3/2/2020    INR goal:   2.0-3.0   TTR:   54.5 % (3.4 mo)   INR used for dosing:   No new INR was available at the time of this encounter.   Warfarin maintenance plan:   10 mg (5 mg x 2) every Mon, Thu; 12.5 mg (5 mg x 2.5) all other days   Full warfarin instructions:   10 mg every Mon, Thu; 12.5 mg all other days   Weekly warfarin total:   82.5 mg   Plan last modified:   Priscila Plasencia, [facility] (11/29/2019)   Next INR check:   3/9/2020   Priority:   High   Target end date:   Indefinite    Indications    Antiphospholipid antibody syndrome (H) [D68.61]  Chronic anticoagulation [Z79.01]             Anticoagulation Episode Summary     INR check location:       Preferred lab:       Send INR reminders to:   LOUIS RIZZO    Comments:   Chromogenic Factor X (20-40%) and INR monitoring for 3 months per Roque Kidd PA-C * transition from Xarelto;  Patient gives permission to leave a detailed message.

## 2020-03-09 ENCOUNTER — TELEPHONE (OUTPATIENT)
Dept: FAMILY MEDICINE | Facility: CLINIC | Age: 30
End: 2020-03-09

## 2020-03-09 DIAGNOSIS — D68.61 ANTIPHOSPHOLIPID ANTIBODY SYNDROME (H): ICD-10-CM

## 2020-03-09 DIAGNOSIS — Z79.01 CHRONIC ANTICOAGULATION: ICD-10-CM

## 2020-03-09 DIAGNOSIS — Z86.711 HISTORY OF PULMONARY EMBOLISM: ICD-10-CM

## 2020-03-09 LAB
FACT X ACT/NOR PPP CHRO: 31 % (ref 70–130)
INR PPP: 1.92 (ref 0.86–1.14)

## 2020-03-09 PROCEDURE — 85610 PROTHROMBIN TIME: CPT | Performed by: FAMILY MEDICINE

## 2020-03-09 PROCEDURE — 85260 CLOT FACTOR X STUART-POWER: CPT | Performed by: FAMILY MEDICINE

## 2020-03-09 PROCEDURE — 36415 COLL VENOUS BLD VENIPUNCTURE: CPT | Performed by: FAMILY MEDICINE

## 2020-03-09 NOTE — TELEPHONE ENCOUNTER
Anticoagulation Management    Unable to reach Tone today.    Today's Factor 10 result of 32% is therapeutic (goal INR of 20-40%).  Result received from: Clinic Lab    Follow up required to confirm warfarin dose taken and assess for changes    Left message to continue current dose of warfarin 10 mg mg tonight. and call back to discuss results.      Anticoagulation clinic to follow up    Susie Gallegos RN

## 2020-03-10 NOTE — TELEPHONE ENCOUNTER
Left message for patient to return call.    Olga Lidia Blank RN  [facility]

## 2020-03-10 NOTE — TELEPHONE ENCOUNTER
ANTICOAGULATION MANAGEMENT     Patient Name:  Tone Marley  Date:  3/10/2020    ASSESSMENT /SUBJECTIVE:      Today's Factor X  result of 31% is therapeutic. Goal INR of 20-40%      Warfarin dose taken: Warfarin taken as previously instructed    Diet: No new diet changes affecting INR    Medication changes/ interactions: No new medications/supplements affecting INR    Previous INR: Therapeutic     S/S of bleeding or thromboembolism: No    New injury or illness:  No    Upcoming surgery, procedure or cardioversion:  No    Additional findings:none      PLAN:    Spoke with Tone regarding INR result and instructed:     Warfarin Dosing Instructions: Continue your current warfarin dose    Instructed patient to follow up no later than: 1 week  Lab visit scheduled    Education provided: Saumya Wayne verbalizes understanding and agrees to warfarin dosing plan.    Instructed to call the Anticoagulation Clinic for any changes, questions or concerns. (#[phone])        OBJECTIVE:  INR   Date Value Ref Range Status   03/09/2020 1.92 (H) 0.86 - 1.14 Final     Chromogenic Factor 10   Date Value Ref Range Status   03/09/2020 31 (L) 70 - 130 % Final     Comment:     Therapeutic Range:  A Chromogenic Factor 10 level of approximately 20-40%   inversely correlates with an INR of 2-3 for patients receiving Warfarin.   Chromogenic Factor 10 levels below 20% indicate an INR greater than 3 and   levels above 40% indicate an INR less than 2.       Factor 2 Assay   Date Value Ref Range Status   02/13/2012 36 (L) 60 - 140 % Final             Anticoagulation Summary  As of 3/9/2020    INR goal:   2.0-3.0   TTR:   56.0 % (3.6 mo)   INR used for dosing:      Warfarin maintenance plan:   10 mg (5 mg x 2) every Mon, Thu; 12.5 mg (5 mg x 2.5) all other days   Full warfarin instructions:   10 mg every Mon, Thu; 12.5 mg all other days   Weekly warfarin total:   82.5 mg   Plan last modified:   Priscila Plasencia, [facility] (11/29/2019)   Next INR  check:      Priority:   High   Target end date:   Indefinite    Indications    Antiphospholipid antibody syndrome (H) [D68.61]  Chronic anticoagulation [Z79.01]             Anticoagulation Episode Summary     INR check location:       Preferred lab:       Send INR reminders to:   LOUIS RIZZO    Comments:   Chromogenic Factor X (20-40%) and INR monitoring for 3 months per Roque Kidd PA-C * transition from Xarelto;  Patient gives permission to leave a detailed message.

## 2020-03-23 DIAGNOSIS — Z86.711 HISTORY OF PULMONARY EMBOLISM: ICD-10-CM

## 2020-03-23 DIAGNOSIS — E29.1 HYPOGONADISM MALE: ICD-10-CM

## 2020-03-23 DIAGNOSIS — Z79.01 CHRONIC ANTICOAGULATION: ICD-10-CM

## 2020-03-23 DIAGNOSIS — R79.89 LOW SERUM TESTOSTERONE: ICD-10-CM

## 2020-03-23 DIAGNOSIS — D68.61 ANTIPHOSPHOLIPID ANTIBODY SYNDROME (H): ICD-10-CM

## 2020-03-23 LAB — INR PPP: 1.72 (ref 0.86–1.14)

## 2020-03-23 PROCEDURE — 85260 CLOT FACTOR X STUART-POWER: CPT | Performed by: INTERNAL MEDICINE

## 2020-03-23 PROCEDURE — 85610 PROTHROMBIN TIME: CPT | Performed by: INTERNAL MEDICINE

## 2020-03-23 PROCEDURE — 84270 ASSAY OF SEX HORMONE GLOBUL: CPT | Performed by: INTERNAL MEDICINE

## 2020-03-23 PROCEDURE — 36415 COLL VENOUS BLD VENIPUNCTURE: CPT | Performed by: INTERNAL MEDICINE

## 2020-03-23 PROCEDURE — 84403 ASSAY OF TOTAL TESTOSTERONE: CPT | Performed by: INTERNAL MEDICINE

## 2020-03-24 ENCOUNTER — TELEPHONE (OUTPATIENT)
Dept: FAMILY MEDICINE | Facility: CLINIC | Age: 30
End: 2020-03-24

## 2020-03-24 DIAGNOSIS — Z79.01 CHRONIC ANTICOAGULATION: ICD-10-CM

## 2020-03-24 DIAGNOSIS — D68.61 ANTIPHOSPHOLIPID ANTIBODY SYNDROME (H): ICD-10-CM

## 2020-03-24 LAB — FACT X ACT/NOR PPP CHRO: 38 % (ref 70–130)

## 2020-03-24 NOTE — TELEPHONE ENCOUNTER
ANTICOAGULATION MANAGEMENT     Patient Name:  Tone Marley  Date:  3/24/2020    ASSESSMENT /SUBJECTIVE:      Today's Factor X result of 38 is therapeutic. Goal INR of 20-40%      Warfarin dose taken: Warfarin taken as previously instructed    Diet: No new diet changes affecting INR    Medication changes/ interactions: No new medications/supplements affecting INR    Previous INR: Therapeutic     S/S of bleeding or thromboembolism: No    New injury or illness:  No    Upcoming surgery, procedure or cardioversion:  No    Additional findings: none      PLAN:    Spoke with Tone regarding INR result and instructed:     Warfarin Dosing Instructions: Continue your current warfarin dose    Instructed patient to follow up no later than: 2 weeks  Lab visit scheduled    Education provided: Saumya Wayne verbalizes understanding and agrees to warfarin dosing plan.    Instructed to call the Anticoagulation Clinic for any changes, questions or concerns. (#[phone])        OBJECTIVE:  INR   Date Value Ref Range Status   03/23/2020 1.72 (H) 0.86 - 1.14 Final     Chromogenic Factor 10   Date Value Ref Range Status   03/23/2020 38 (L) 70 - 130 % Final     Comment:     Therapeutic Range:  A Chromogenic Factor 10 level of approximately 20-40%   inversely correlates with an INR of 2-3 for patients receiving Warfarin.   Chromogenic Factor 10 levels below 20% indicate an INR greater than 3 and   levels above 40% indicate an INR less than 2.       Factor 2 Assay   Date Value Ref Range Status   02/13/2012 36 (L) 60 - 140 % Final             Anticoagulation Summary  As of 3/24/2020    INR goal:   2.0-3.0   TTR:   49.6 % (4.1 mo)   INR used for dosing:   No new INR was available at the time of this encounter.   Warfarin maintenance plan:   10 mg (5 mg x 2) every Mon, Thu; 12.5 mg (5 mg x 2.5) all other days   Full warfarin instructions:   10 mg every Mon, Thu; 12.5 mg all other days   Weekly warfarin total:   82.5 mg   Plan last  modified:   Priscila Plasencia, [facility] (11/29/2019)   Next INR check:   4/6/2020   Priority:   Maintenance   Target end date:   Indefinite    Indications    Antiphospholipid antibody syndrome (H) [D68.61]  Chronic anticoagulation [Z79.01]             Anticoagulation Episode Summary     INR check location:       Preferred lab:       Send INR reminders to:   LOUIS RIZZO    Comments:   Chromogenic Factor X (20-40%) and INR monitoring for 3 months per Roque Kidd PA-C * transition from Xarelto;  Patient gives permission to leave a detailed message.

## 2020-03-25 LAB
SHBG SERPL-SCNC: 13 NMOL/L (ref 11–80)
TESTOST FREE SERPL-MCNC: 28.7 NG/DL (ref 4.7–24.4)
TESTOST SERPL-MCNC: 860 NG/DL (ref 240–950)

## 2020-03-25 RX ORDER — TESTOSTERONE CYPIONATE 200 MG/ML
100 INJECTION, SOLUTION INTRAMUSCULAR
Qty: 1 ML | Refills: 5 | Status: SHIPPED | OUTPATIENT
Start: 2020-03-25 | End: 2020-06-05 | Stop reason: DRUGHIGH

## 2020-04-06 DIAGNOSIS — D68.61 ANTIPHOSPHOLIPID ANTIBODY SYNDROME (H): ICD-10-CM

## 2020-04-06 DIAGNOSIS — Z86.711 HISTORY OF PULMONARY EMBOLISM: ICD-10-CM

## 2020-04-06 LAB — INR PPP: 2.34 (ref 0.86–1.14)

## 2020-04-06 PROCEDURE — 36415 COLL VENOUS BLD VENIPUNCTURE: CPT | Performed by: FAMILY MEDICINE

## 2020-04-06 PROCEDURE — 85610 PROTHROMBIN TIME: CPT | Performed by: FAMILY MEDICINE

## 2020-04-06 PROCEDURE — 85260 CLOT FACTOR X STUART-POWER: CPT | Performed by: FAMILY MEDICINE

## 2020-04-07 ENCOUNTER — ANTICOAGULATION THERAPY VISIT (OUTPATIENT)
Dept: NURSING | Facility: CLINIC | Age: 30
End: 2020-04-07

## 2020-04-07 DIAGNOSIS — Z79.01 CHRONIC ANTICOAGULATION: ICD-10-CM

## 2020-04-07 DIAGNOSIS — D68.61 ANTIPHOSPHOLIPID ANTIBODY SYNDROME (H): ICD-10-CM

## 2020-04-07 LAB — FACT X ACT/NOR PPP CHRO: 31 % (ref 70–130)

## 2020-04-07 NOTE — PROGRESS NOTES
Pt denies any changes in diet,health or activity. Tone is aware if signs of clotting (pain, tenderness, swelling, color change in leg or arm, SOB) and bleeding occur (blood in stool, urine, large bruising, bleeding gums, nosebleeds) to have INR check sooner. If sx severe report to ER or concerned for stroke call 911. If general questions or concerns arise, call clinic.    Olga Lidia Blank RN  [facility]

## 2020-04-11 DIAGNOSIS — E78.2 MIXED HYPERLIPIDEMIA: ICD-10-CM

## 2020-04-11 DIAGNOSIS — I10 BENIGN ESSENTIAL HYPERTENSION: ICD-10-CM

## 2020-04-13 RX ORDER — LOSARTAN POTASSIUM 100 MG/1
TABLET ORAL
Qty: 90 TABLET | Refills: 0 | Status: SHIPPED | OUTPATIENT
Start: 2020-04-13 | End: 2020-07-16

## 2020-04-13 RX ORDER — HYDROCHLOROTHIAZIDE 25 MG/1
TABLET ORAL
Qty: 90 TABLET | Refills: 0 | Status: SHIPPED | OUTPATIENT
Start: 2020-04-13 | End: 2020-07-16

## 2020-04-13 RX ORDER — ATORVASTATIN CALCIUM 10 MG/1
TABLET, FILM COATED ORAL
Qty: 90 TABLET | Refills: 0 | Status: SHIPPED | OUTPATIENT
Start: 2020-04-13 | End: 2020-07-16

## 2020-04-13 NOTE — TELEPHONE ENCOUNTER
"Prescription approved per [facility] Refill Protocol.  Lala GOMEZ RN    Last Written Prescription Date:  4/1/2019  Last Fill Quantity: 90,  # refills: 3   Last office visit: 8/20/2019 with prescribing provider:     Future Office Visit:    Requested Prescriptions   Pending Prescriptions Disp Refills     hydrochlorothiazide (HYDRODIURIL) 25 MG tablet [Pharmacy Med Name: HYDROCHLOROTHIAZIDE 25MG TABLETS] 90 tablet 3     Sig: TAKE ONE TABLET BY MOUTH EVERY DAY       Diuretics (Including Combos) Protocol Failed - 4/11/2020  5:44 AM        Failed - Blood pressure under 140/90 in past 12 months     BP Readings from Last 3 Encounters:   02/24/20 (!) 147/90   10/28/19 (!) 150/89   10/11/19 (!) 151/97                 Passed - Recent (12 mo) or future (30 days) visit within the authorizing provider's specialty     Patient has had an office visit with the authorizing provider or a provider within the authorizing providers department within the previous 12 mos or has a future within next 30 days. See \"Patient Info\" tab in inbasket, or \"Choose Columns\" in Meds & Orders section of the refill encounter.              Passed - Medication is active on med list        Passed - Patient is age 18 or older        Passed - Normal serum creatinine on file in past 12 months     Recent Labs   Lab Test 08/20/19  0928   CR 0.85              Passed - Normal serum potassium on file in past 12 months     Recent Labs   Lab Test 08/20/19  0928   POTASSIUM 4.1                    Passed - Normal serum sodium on file in past 12 months     Recent Labs   Lab Test 08/20/19  0928                    losartan (COZAAR) 100 MG tablet [Pharmacy Med Name: LOSARTAN 100MG TABLETS] 90 tablet 3     Sig: TAKE 1 TABLET BY MOUTH EVERY DAY       Angiotensin-II Receptors Failed - 4/11/2020  5:44 AM        Failed - Last blood pressure under 140/90 in past 12 months     BP Readings from Last 3 Encounters:   02/24/20 (!) 147/90   10/28/19 (!) 150/89   10/11/19 (!) 151/97 " "                Passed - Recent (12 mo) or future (30 days) visit within the authorizing provider's specialty     Patient has had an office visit with the authorizing provider or a provider within the authorizing providers department within the previous 12 mos or has a future within next 30 days. See \"Patient Info\" tab in inbasket, or \"Choose Columns\" in Meds & Orders section of the refill encounter.              Passed - Medication is active on med list        Passed - Patient is age 18 or older        Passed - Normal serum creatinine on file in past 12 months     Recent Labs   Lab Test 08/20/19  0928   CR 0.85       Ok to refill medication if creatinine is low          Passed - Normal serum potassium on file in past 12 months     Recent Labs   Lab Test 08/20/19  0928   POTASSIUM 4.1                       atorvastatin (LIPITOR) 10 MG tablet [Pharmacy Med Name: ATORVASTATIN 10MG TABLETS] 90 tablet 3     Sig: TAKE 1 TABLET BY MOUTH EVERY DAY       Statins Protocol Passed - 4/11/2020  5:44 AM        Passed - LDL on file in past 12 months     Recent Labs   Lab Test 08/20/19  0928   LDL 71             Passed - No abnormal creatine kinase in past 12 months     No lab results found.             Passed - Recent (12 mo) or future (30 days) visit within the authorizing provider's specialty     Patient has had an office visit with the authorizing provider or a provider within the authorizing providers department within the previous 12 mos or has a future within next 30 days. See \"Patient Info\" tab in inbasket, or \"Choose Columns\" in Meds & Orders section of the refill encounter.              Passed - Medication is active on med list        Passed - Patient is age 18 or older           "

## 2020-04-20 ENCOUNTER — NURSE TRIAGE (OUTPATIENT)
Dept: NURSING | Facility: CLINIC | Age: 30
End: 2020-04-20

## 2020-04-20 ENCOUNTER — RESULT FOLLOW UP (OUTPATIENT)
Dept: FAMILY MEDICINE | Facility: CLINIC | Age: 30
End: 2020-04-20

## 2020-04-20 DIAGNOSIS — D68.61 ANTIPHOSPHOLIPID ANTIBODY SYNDROME (H): ICD-10-CM

## 2020-04-20 DIAGNOSIS — Z86.711 HISTORY OF PULMONARY EMBOLISM: ICD-10-CM

## 2020-04-20 DIAGNOSIS — D68.61 ANTIPHOSPHOLIPID ANTIBODY SYNDROME (H): Primary | ICD-10-CM

## 2020-04-20 LAB
APTT PPP: 75 SEC (ref 22–37)
INR PPP: 4.97 (ref 0.86–1.14)

## 2020-04-20 PROCEDURE — 85610 PROTHROMBIN TIME: CPT | Performed by: FAMILY MEDICINE

## 2020-04-20 PROCEDURE — 36415 COLL VENOUS BLD VENIPUNCTURE: CPT | Performed by: FAMILY MEDICINE

## 2020-04-20 PROCEDURE — 85260 CLOT FACTOR X STUART-POWER: CPT | Performed by: FAMILY MEDICINE

## 2020-04-21 ENCOUNTER — TELEPHONE (OUTPATIENT)
Dept: FAMILY MEDICINE | Facility: CLINIC | Age: 30
End: 2020-04-21

## 2020-04-21 DIAGNOSIS — Z79.01 CHRONIC ANTICOAGULATION: ICD-10-CM

## 2020-04-21 DIAGNOSIS — D68.61 ANTIPHOSPHOLIPID ANTIBODY SYNDROME (H): ICD-10-CM

## 2020-04-21 LAB — FACT X ACT/NOR PPP CHRO: 23 % (ref 70–130)

## 2020-04-21 NOTE — TELEPHONE ENCOUNTER
Anticoagulation Management    Unable to reach Tone today.    Today's CF10 result of 23% is therapeutic (goal CF10 of 20%-40%).  Result received from: Clinic Lab    Follow up required to confirm warfarin dose taken and assess for changes    Left message to continue usual dose of warfarin: 10mg Mon/Thurs; 12.5mg all other days. Also left message to call [facility] [phone] to schedule next lab appt    Anticoagulation clinic to follow up    Paulette Medina RN

## 2020-04-21 NOTE — TELEPHONE ENCOUNTER
Pt states he received a call from a doctor regarding his INR level of 8.09 and was told the lab was going to rerun the test.  Pt states he is available now to speak with and would like to connect with the provider.     9:38PM:  Smart Web used to page on-call MD Nelly Álvarez to call writer at [phone].   9:40PM:  Dr. Álvarez called, states she will contact patient.      Anabella Tavares RN/NGOC    Reason for Disposition    [1] Follow-up call from patient regarding patient's clinical status AND [2] information urgent    Additional Information    Negative: Lab calling with strep throat test results and triager can call in prescription    Negative: Lab calling with urinalysis test results and triager can call in prescription    Negative: Medication questions    Negative: ED call to PCP    Negative: Physician call to PCP    Negative: Call about patient who is currently hospitalized    Negative: Lab or radiology calling with CRITICAL test results    Negative: [1] Prescription not at pharmacy AND [2] was prescribed today by PCP    Protocols used: [facility]

## 2020-04-21 NOTE — PROGRESS NOTES
Paged by the lab with a critical INR of 8.09.  Pt takes warfarin 10 and 12.5 mg alternated for APS.  I asked the lab to re-run the results.  Twice the INR returned lower at 4.9.  PTT was also run and returned at 75.    Spoke to the pt who reported that his INR is notoriously unreliable and his dosing is based off of the factor 10 levels.  These are still pending.  He denies any changes to his meds and no new meds or dietary changes made.  He is a pharmacist and aware of drug interactions. He denies any bruising or bleeding.    Advised to hold tonight's dose and await factor 10 results and contact his AC clinic in the morning    Nelly Álvarez MD

## 2020-04-21 NOTE — TELEPHONE ENCOUNTER
Roque Kidd, Fiordaliza Galarza RN    Caller: Unspecified (Today, 12:44 PM)               Fiordaliza,     I agree. We do not have to continue to check INR's now. It seems to me that monitoring his chromogenic factor X levels is the way to go for future coumadin monitoring.     Thank you.     Roque         Patient informed and chart updated.  Fiordaliza Hatch, RN  [facility]

## 2020-04-21 NOTE — TELEPHONE ENCOUNTER
Roque,  Are you okay if we drop INR Lab testing for this patient?  Original recommendation was to draw Chromogenic factor 10 AND INR for 3 months for Correlation.  We dose patient's Coumadin based on Factor 10 result.     Please advise.    Thank you.  Fiordaliza Hatch, RN  Anticoagulation Nurse - [facility], [place]    Anticoagulation Monitoring INR Value Factor X   Latest Ref Rng & Units 0.86 - 1.14 70 - 130 %   4/20/2020 4.97 (H) 23 (L)   4/6/2020 2.34 (H) 31 (L)   3/23/2020 1.72 (H) 38 (L)   3/9/2020 1.92 (H) 31 (L)   3/2/2020 2.30 (H) 28 (L)   2/24/2020 3.09 (H) 28 (L)   2/10/2020 2.23 (H) 28 (L)   2/3/2020 2.47 (H) 30 (L)   1/27/2020 1.80 (H) 37 (L)   1/20/2020 1.92 (H) 31 (L)   1/13/2020 2.45 (H) 28 (L)   1/6/2020 3.74 (H) 20 (L)   12/23/2019  22 (L)   12/17/2019 2.42 (H) 23 (L)   12/10/2019 1.87 (H) 31 (L)   12/3/2019 2.01 (H) 32 (L)   12/2/2019 2.00 (H) 34 (L)   11/29/2019 1.72 (H) 41 (L)   11/27/2019 1.59 (H) 53 (L)   11/25/2019 1.64 (H) 32 (L)   11/21/2019 1.46 (H) 64 (L)   11/18/2019 1.23 (H) 89   11/14/2019 1.13 117   11/12/2019 1.22 (H) 105

## 2020-04-21 NOTE — TELEPHONE ENCOUNTER
ANTICOAGULATION  MANAGEMENT-Factor 10    Assessment     Therapeutic Factor 10 is 23% . Goal range 20-40%. Received via Clinic Lab.      Tone was last contacted by phone: Today    Plan     Patient held his coumadin dose last evening as INR came back SupraTherapeutic. However pateint's Coumadin is dosed from Factor 10 result.  He will continue current coumadin dose of 10 mg Mon, Thu, and 12.5 mg all other days.  Writer will send a note to PCP, Roque Kidd PA-C to see if he is approves dropping INR testing since patient is dosed from Chromogenic factor 10.  Patient agrees with plan.  ?       OBJECTIVE    INR   Date Value Ref Range Status   04/20/2020 4.97 (H) 0.86 - 1.14 Corrected     Comment:     NOTIFIED DR ALANA SHARPE @2145 04.20.20 [facility]. BY EK  CORRECTED ON 04/20 AT 2146: PREVIOUSLY REPORTED AS 8.09 Critical Value called   to and read back by DR LEISA ESTRELLA @2125 04.20.20 [facility]. BY EK       Chromogenic Factor 10   Date Value Ref Range Status   04/20/2020 23 (L) 70 - 130 % Final     Comment:     Therapeutic Range:  A Chromogenic Factor 10 level of approximately 20-40%   inversely correlates with an INR of 2-3 for patients receiving Warfarin.   Chromogenic Factor 10 levels below 20% indicate an INR greater than 3 and   levels above 40% indicate an INR less than 2.       Factor 2 Assay   Date Value Ref Range Status   02/13/2012 36 (L) 60 - 140 % Final       ASSESSMENT / PLAN      Anticoagulation Summary  As of 4/21/2020    INR goal:   2.0-3.0   TTR:   47.9 % (5 mo)   INR used for dosing:   No new INR was available at the time of this encounter.   Warfarin maintenance plan:   10 mg (5 mg x 2) every Mon, Thu; 12.5 mg (5 mg x 2.5) all other days   Full warfarin instructions:   10 mg every Mon, Thu; 12.5 mg all other days   Weekly warfarin total:   82.5 mg   No change documented:   Fiordaliza Hatch, RN   Plan last modified:   Priscila Plasencia, [facility] (11/29/2019)   Next INR check:   5/4/2020   Priority:   Maintenance    Target end date:   Indefinite    Indications    Antiphospholipid antibody syndrome (H) [D68.61]  Chronic anticoagulation [Z79.01]             Anticoagulation Episode Summary     INR check location:       Preferred lab:       Send INR reminders to:   LOUIS RIZZO    Comments:   Chromogenic Factor X (20-40%) and INR monitoring for 3 months per Roque Kidd PA-C * transition from Xarelto;  Patient gives permission to leave a detailed message.

## 2020-05-04 DIAGNOSIS — D68.61 ANTIPHOSPHOLIPID ANTIBODY SYNDROME (H): ICD-10-CM

## 2020-05-04 DIAGNOSIS — Z86.711 HISTORY OF PULMONARY EMBOLISM: ICD-10-CM

## 2020-05-04 PROCEDURE — 85260 CLOT FACTOR X STUART-POWER: CPT | Performed by: FAMILY MEDICINE

## 2020-05-04 PROCEDURE — 36415 COLL VENOUS BLD VENIPUNCTURE: CPT | Performed by: FAMILY MEDICINE

## 2020-05-05 ENCOUNTER — TELEPHONE (OUTPATIENT)
Dept: FAMILY MEDICINE | Facility: CLINIC | Age: 30
End: 2020-05-05

## 2020-05-05 DIAGNOSIS — D68.61 ANTIPHOSPHOLIPID ANTIBODY SYNDROME (H): ICD-10-CM

## 2020-05-05 DIAGNOSIS — Z79.01 CHRONIC ANTICOAGULATION: ICD-10-CM

## 2020-05-05 LAB — FACT X ACT/NOR PPP CHRO: 23 % (ref 70–130)

## 2020-05-05 NOTE — TELEPHONE ENCOUNTER
ANTICOAGULATION MANAGEMENT     Patient Name:  Tone Marley  Date:  5/5/2020    ASSESSMENT /SUBJECTIVE:    Today's Factor 10 result of 23% is therapeutic. Goal of 20-40%      Warfarin dose taken: Warfarin taken as previously instructed    Diet: No new diet changes affecting INR    Medication changes/ interactions: No new medications/supplements affecting INR    Previous Factor: Therapeutic     S/S of bleeding or thromboembolism: No    New injury or illness: No    Upcoming surgery, procedure or cardioversion: No    Additional findings: None      PLAN:    Spoke with Tone regarding INR result and instructed:     Warfarin Dosing Instructions: Continue your current warfarin dose 10 mg Mon, Thu; 12.5 mg all other days    Instructed patient to follow up no later than: 2 weeks  Lab visit scheduled    Education provided: None required      Tone,  verbalizes understanding and agrees to warfarin dosing plan.    Instructed to call the Anticoagulation Clinic for any changes, questions or concerns. (#[phone])        OBJECTIVE:  INR   Date Value Ref Range Status   04/20/2020 4.97 (H) 0.86 - 1.14 Corrected     Comment:     NOTIFIED DR ALANA SHARPE @2145 04.20.20 [facility]. BY EK  CORRECTED ON 04/20 AT 2146: PREVIOUSLY REPORTED AS 8.09 Critical Value called   to and read back by DR LEISA ESTRELLA @2125 04.20.20 [facility]. BY EK       Chromogenic Factor 10   Date Value Ref Range Status   05/04/2020 23 (L) 70 - 130 % Final     Comment:     Therapeutic Range:  A Chromogenic Factor 10 level of approximately 20-40%   inversely correlates with an INR of 2-3 for patients receiving Warfarin.   Chromogenic Factor 10 levels below 20% indicate an INR greater than 3 and   levels above 40% indicate an INR less than 2.       Factor 2 Assay   Date Value Ref Range Status   02/13/2012 36 (L) 60 - 140 % Final             Anticoagulation Summary  As of 5/5/2020    INR goal:   2.0-3.0   TTR:   47.9 % (5 mo)   INR used for dosing:   No new INR  was available at the time of this encounter.   Warfarin maintenance plan:   10 mg (5 mg x 2) every Mon, Thu; 12.5 mg (5 mg x 2.5) all other days   Full warfarin instructions:   10 mg every Mon, Thu; 12.5 mg all other days   Weekly warfarin total:   82.5 mg   No change documented:   Fiordaliza Hatch RN   Plan last modified:   Priscila Plasencia [facility] (11/29/2019)   Next INR check:   5/19/2020   Priority:   Maintenance   Target end date:   Indefinite    Indications    Antiphospholipid antibody syndrome (H) [D68.61]  Chronic anticoagulation [Z79.01]             Anticoagulation Episode Summary     INR check location:       Preferred lab:       Send INR reminders to:   LOUIS RIZZO    Comments:   Chromogenic Factor X (20-40%) per Roque Kidd PA-C * transition from Xarelto;  Patient gives permission to leave a detailed message.

## 2020-05-19 ENCOUNTER — TELEPHONE (OUTPATIENT)
Dept: FAMILY MEDICINE | Facility: CLINIC | Age: 30
End: 2020-05-19

## 2020-05-19 DIAGNOSIS — Z86.711 HISTORY OF PULMONARY EMBOLISM: ICD-10-CM

## 2020-05-19 DIAGNOSIS — D68.61 ANTIPHOSPHOLIPID ANTIBODY SYNDROME (H): ICD-10-CM

## 2020-05-19 DIAGNOSIS — Z79.01 CHRONIC ANTICOAGULATION: ICD-10-CM

## 2020-05-19 LAB — FACT X ACT/NOR PPP CHRO: 23 % (ref 70–130)

## 2020-05-19 PROCEDURE — 36415 COLL VENOUS BLD VENIPUNCTURE: CPT | Performed by: FAMILY MEDICINE

## 2020-05-19 PROCEDURE — 85260 CLOT FACTOR X STUART-POWER: CPT | Performed by: FAMILY MEDICINE

## 2020-05-20 ENCOUNTER — TELEPHONE (OUTPATIENT)
Dept: ENDOCRINOLOGY | Facility: CLINIC | Age: 30
End: 2020-05-20

## 2020-05-20 DIAGNOSIS — Z86.73 HISTORY OF CVA (CEREBROVASCULAR ACCIDENT): ICD-10-CM

## 2020-05-20 DIAGNOSIS — R79.89 LOW SERUM TESTOSTERONE: ICD-10-CM

## 2020-05-20 DIAGNOSIS — D68.61 ANTIPHOSPHOLIPID ANTIBODY SYNDROME (H): ICD-10-CM

## 2020-05-20 DIAGNOSIS — E29.1 HYPOGONADISM MALE: Primary | ICD-10-CM

## 2020-05-20 NOTE — TELEPHONE ENCOUNTER
[facility] Call Center    Phone Message    May a detailed message be left on voicemail: yes     Reason for Call: Order(s): Other:   Reason for requested: Requesting orders for testosterone check. Pt states he will be injecting next week, and has labs scheduled for 6/2 in [place]. Please place orders when available, thank you.  Date needed: 6/2/20  Provider name: Dr. Zuleta    Action Taken: Message routed to:  [facility] Endocrinology    Travel Screening: Not Applicable

## 2020-05-21 DIAGNOSIS — Z86.711 HISTORY OF PULMONARY EMBOLISM: ICD-10-CM

## 2020-05-21 DIAGNOSIS — D68.61 ANTIPHOSPHOLIPID ANTIBODY SYNDROME (H): ICD-10-CM

## 2020-05-21 DIAGNOSIS — Z79.01 CHRONIC ANTICOAGULATION: ICD-10-CM

## 2020-05-21 DIAGNOSIS — Z86.73 HISTORY OF CVA (CEREBROVASCULAR ACCIDENT): ICD-10-CM

## 2020-05-22 RX ORDER — WARFARIN SODIUM 10 MG/1
TABLET ORAL
Qty: 90 TABLET | Refills: 0 | Status: SHIPPED | OUTPATIENT
Start: 2020-05-22 | End: 2020-08-17

## 2020-05-22 NOTE — TELEPHONE ENCOUNTER
Prescription approved per [facility] Refill Protocol.  Lala GOMEZ RN

## 2020-06-02 ENCOUNTER — ANTICOAGULATION THERAPY VISIT (OUTPATIENT)
Dept: FAMILY MEDICINE | Facility: CLINIC | Age: 30
End: 2020-06-02

## 2020-06-02 DIAGNOSIS — D68.61 ANTIPHOSPHOLIPID ANTIBODY SYNDROME (H): ICD-10-CM

## 2020-06-02 DIAGNOSIS — Z79.01 CHRONIC ANTICOAGULATION: ICD-10-CM

## 2020-06-02 DIAGNOSIS — R79.89 LOW SERUM TESTOSTERONE: ICD-10-CM

## 2020-06-02 DIAGNOSIS — Z86.711 HISTORY OF PULMONARY EMBOLISM: ICD-10-CM

## 2020-06-02 DIAGNOSIS — Z86.73 HISTORY OF CVA (CEREBROVASCULAR ACCIDENT): ICD-10-CM

## 2020-06-02 DIAGNOSIS — E29.1 HYPOGONADISM MALE: ICD-10-CM

## 2020-06-02 LAB — FACT X ACT/NOR PPP CHRO: 18 % (ref 70–130)

## 2020-06-02 PROCEDURE — 84270 ASSAY OF SEX HORMONE GLOBUL: CPT | Performed by: INTERNAL MEDICINE

## 2020-06-02 PROCEDURE — 85260 CLOT FACTOR X STUART-POWER: CPT | Performed by: INTERNAL MEDICINE

## 2020-06-02 PROCEDURE — 36415 COLL VENOUS BLD VENIPUNCTURE: CPT | Performed by: INTERNAL MEDICINE

## 2020-06-02 PROCEDURE — 84403 ASSAY OF TOTAL TESTOSTERONE: CPT | Performed by: INTERNAL MEDICINE

## 2020-06-02 NOTE — PROGRESS NOTES
Anticoagulation Management    Unable to reach Tone today.    Today's Factor 10 result is 18% and is supratherapeutic (goal INR of 20-40%).  Result received from: Clinic Lab    Follow up required to confirm warfarin dose taken and assess for changes  Any changes about 7-10 days ago around Memorial weekend?  How about Testosterone dose change?    Left message to hold warfarin tonight.      Anticoagulation clinic to follow up    Fiordaliza Hatch RN

## 2020-06-02 NOTE — PROGRESS NOTES
"ANTICOAGULATION MANAGEMENT     Patient Name:  Tone Marley  Date:  6/2/2020    ASSESSMENT /SUBJECTIVE:    Today's Factor 10 result of 18% is subtherapeutic. Goal of CF 10: 20-40%      Warfarin dose taken: Warfarin taken as previously instructed    Diet: No new diet changes affecting INR    Medication changes/ interactions: No new medications/supplements affecting INR    Previous INR: Therapeutic     S/S of bleeding or thromboembolism: No    New injury or illness: No    Upcoming surgery, procedure or cardioversion: No    Additional findings: The weekend of [place] (8[place]10 [place] Bilateral LL that were exposed \"the blood vessels all broke\".  Resolving.  No other s/s of bleeding      PLAN:    Spoke with Tone regarding INR result and instructed:     Warfarin Dosing Instructions: Hold tonight then continue your current warfarin dose of 10 mg M/TH; 12.5 mg ROW    Instructed patient to follow up no later than: 2 weeks  Lab visit scheduled    Education provided: None required      Tone verbalizes understanding and agrees to warfarin dosing plan.    Instructed to call the Anticoagulation Clinic for any changes, questions or concerns. (#[phone])        OBJECTIVE:  INR   Date Value Ref Range Status   04/20/2020 4.97 (H) 0.86 - 1.14 Corrected     Comment:     NOTIFIED DR ALANA SHARPE @2145 04.20.20 LAB. BY EK  CORRECTED ON 04/20 AT 2146: PREVIOUSLY REPORTED AS 8.09 Critical Value called   to and read back by DR LEISA ESTRELLA @2125 04.20.20 LAB. BY EK       Chromogenic Factor 10   Date Value Ref Range Status   06/02/2020 18 (L) 70 - 130 % Final     Comment:     Therapeutic Range:  A Chromogenic Factor 10 level of approximately 20-40%   inversely correlates with an INR of 2-3 for patients receiving Warfarin.   Chromogenic Factor 10 levels below 20% indicate an INR greater than 3 and   levels above 40% indicate an INR less than 2.       Factor 2 Assay   Date Value Ref Range " Status   02/13/2012 36 (L) 60 - 140 % Final         No question data found.  Anticoagulation Summary  As of 6/2/2020    INR goal:   2.0-3.0   TTR:   47.9 % (5 mo)   INR used for dosing:   No new INR was available at the time of this encounter.   Warfarin maintenance plan:   10 mg (5 mg x 2) every Mon, Thu; 12.5 mg (5 mg x 2.5) all other days   Full warfarin instructions:   6/2: Hold; Otherwise 10 mg every Mon, Thu; 12.5 mg all other days   Weekly warfarin total:   82.5 mg   Plan last modified:   Priscila Plasencia, [facility] (11/29/2019)   Next INR check:   6/16/2020   Priority:   Maintenance   Target end date:   Indefinite    Indications    Antiphospholipid antibody syndrome (H) [D68.61]  Chronic anticoagulation [Z79.01]             Anticoagulation Episode Summary     INR check location:       Preferred lab:       Send INR reminders to:   LOUIS RIZZO    Comments:   Chromogenic Factor X (20-40%) per Roque Kidd PA-C * transition from Xarelto;  Patient gives permission to leave a detailed message.

## 2020-06-04 LAB
SHBG SERPL-SCNC: 11 NMOL/L (ref 11–80)
TESTOST FREE SERPL-MCNC: 10.57 NG/DL (ref 4.7–24.4)
TESTOST SERPL-MCNC: 333 NG/DL (ref 240–950)

## 2020-06-05 RX ORDER — TESTOSTERONE CYPIONATE 1000 MG/10ML
150 INJECTION, SOLUTION INTRAMUSCULAR
Qty: 9 ML | Refills: 1 | Status: SHIPPED | OUTPATIENT
Start: 2020-06-05 | End: 2020-12-11

## 2020-06-05 NOTE — PROGRESS NOTES
Dose adjustment for testosterone upwards from 100 mg q 2 wks to 150 mg q 2 wks needed based on labs:    Results for ABILIO MAYES (MRN [id number]) as of 6/5/2020 15:50   Ref. Range 2/10/2020 08:31 2/24/2020 08:33 3/2/2020 08:42 3/9/2020 08:38 3/23/2020 08:04 4/6/2020 13:41 4/20/2020 12:16 5/4/2020 11:14 5/19/2020 08:22 6/2/2020 08:41   Testosterone Total Latest Ref Range: 240 - 950 ng/dL 333    860     333     Prescription sent to [facility] in [place]. Repeat labs needed in 1-3 months. Labs ordered. Style[employer] message sent to patient.    Dr. Starla Zuleta MD, MPH  Endocrinologist

## 2020-06-17 ENCOUNTER — ANTICOAGULATION THERAPY VISIT (OUTPATIENT)
Dept: FAMILY MEDICINE | Facility: CLINIC | Age: 30
End: 2020-06-17

## 2020-06-17 DIAGNOSIS — D68.61 ANTIPHOSPHOLIPID ANTIBODY SYNDROME (H): ICD-10-CM

## 2020-06-17 DIAGNOSIS — Z79.01 CHRONIC ANTICOAGULATION: ICD-10-CM

## 2020-06-17 DIAGNOSIS — Z86.711 HISTORY OF PULMONARY EMBOLISM: ICD-10-CM

## 2020-06-17 LAB — FACT X ACT/NOR PPP CHRO: 23 % (ref 70–130)

## 2020-06-17 PROCEDURE — 85260 CLOT FACTOR X STUART-POWER: CPT | Performed by: FAMILY MEDICINE

## 2020-06-17 PROCEDURE — 36415 COLL VENOUS BLD VENIPUNCTURE: CPT | Performed by: FAMILY MEDICINE

## 2020-06-17 NOTE — PROGRESS NOTES
Anticoagulation Monitoring Factor X   Latest Ref Rng & Units 70 - 130 %   6/17/2020 23 (L)   6/2/2020 18 (L)   6/2/2020 5/19/2020 23 (L)   5/19/2020 5/5/2020 5/4/2020 23 (L)   4/21/2020 4/21/2020 4/20/2020 23 (L)   4/7/2020 4/6/2020 31 (L)   3/24/2020    3/23/2020 38 (L)   3/9/2020 31 (L)   3/9/2020    3/2/2020 28 (L)         ANTICOAGULATION  MANAGEMENT- Chromogenic Factor 10    Assessment     Therapeutic Factor 10 result of 23% . Goal range 20-40%. Received result via clinic lab.       Tone was contacted by phone today.    Plan     Patient denies any overall changes.  He will continue his current coumadin dosing of 10 mg Mon, Thu, and 12.5 mg all other days and recheck CF10 in 3 weeks. Lab appt scheduled for 7/7 at 8:00 am. ?       OBJECTIVE    INR   Date Value Ref Range Status   04/20/2020 4.97 (H) 0.86 - 1.14 Corrected     Comment:     NOTIFIED DR ALANA SHARPE @2145 04.20.20 [facility]. BY EK  CORRECTED ON 04/20 AT 2146: PREVIOUSLY REPORTED AS 8.09 Critical Value called   to and read back by DR LEISA ESTRELLA @2125 04.20.20 [facility]. BY EK       Chromogenic Factor 10   Date Value Ref Range Status   06/17/2020 23 (L) 70 - 130 % Final     Comment:     Therapeutic Range:  A Chromogenic Factor 10 level of approximately 20-40%   inversely correlates with an INR of 2-3 for patients receiving Warfarin.   Chromogenic Factor 10 levels below 20% indicate an INR greater than 3 and   levels above 40% indicate an INR less than 2.       Factor 2 Assay   Date Value Ref Range Status   02/13/2012 36 (L) 60 - 140 % Final       ASSESSMENT / PLAN  No question data found.  Anticoagulation Summary  As of 6/17/2020    INR goal:   2.0-3.0   TTR:   47.9 % (5 mo)   INR used for dosing:   No new INR was available at the time of this encounter.   Warfarin maintenance plan:   10 mg (5 mg x 2) every Mon, Thu; 12.5 mg (5 mg x 2.5) all other days   Full warfarin instructions:   10 mg every Mon, Thu; 12.5 mg all other days    Weekly warfarin total:   82.5 mg   No change documented:   Fiordaliza Hatch, RN   Plan last modified:   Priscila Plasencia, [facility] (11/29/2019)   Next INR check:      Priority:   Maintenance   Target end date:   Indefinite    Indications    Antiphospholipid antibody syndrome (H) [D68.61]  Chronic anticoagulation [Z79.01]             Anticoagulation Episode Summary     INR check location:       Preferred lab:       Send INR reminders to:   LOUIS RIZZO    Comments:   Chromogenic Factor X (20-40%) per Roque Kidd PA-C * transition from Xarelto;  Patient gives permission to leave a detailed message.

## 2020-07-07 ENCOUNTER — ANTICOAGULATION THERAPY VISIT (OUTPATIENT)
Dept: FAMILY MEDICINE | Facility: CLINIC | Age: 30
End: 2020-07-07

## 2020-07-07 DIAGNOSIS — D68.61 ANTIPHOSPHOLIPID ANTIBODY SYNDROME (H): ICD-10-CM

## 2020-07-07 DIAGNOSIS — Z79.01 CHRONIC ANTICOAGULATION: ICD-10-CM

## 2020-07-07 DIAGNOSIS — Z86.711 HISTORY OF PULMONARY EMBOLISM: ICD-10-CM

## 2020-07-07 LAB — FACT X ACT/NOR PPP CHRO: 28 % (ref 70–130)

## 2020-07-07 PROCEDURE — 36415 COLL VENOUS BLD VENIPUNCTURE: CPT | Performed by: FAMILY MEDICINE

## 2020-07-07 PROCEDURE — 85260 CLOT FACTOR X STUART-POWER: CPT | Performed by: FAMILY MEDICINE

## 2020-07-07 NOTE — PROGRESS NOTES
Anticoagulation Monitoring Factor X   Latest Ref Rng & Units 70 - 130 %   7/7/2020 28 (L)   6/17/2020 23 (L)   6/17/2020 6/2/2020 18 (L)   6/2/2020 5/19/2020 23 (L)   5/19/2020 5/5/2020 5/4/2020 23 (L)   4/21/2020 4/21/2020 4/20/2020 23 (L)         ANTICOAGULATION MANAGEMENT     Patient Name:  Tone Marley  Date:  7/7/2020    ASSESSMENT /SUBJECTIVE:    Today's Factor X result of 28% is therapeutic. Goal Factor X of 20-40%      Warfarin dose taken: Warfarin taken as previously instructed    Diet: No new diet changes affecting INR    Medication changes/ interactions: Tetosterone dose increased    Previous INR: Therapeutic     S/S of bleeding or thromboembolism: No    New injury or illness: No    Upcoming surgery, procedure or cardioversion: No    Additional findings: None      PLAN:    Spoke with Tone regarding INR result and instructed:     Warfarin Dosing Instructions: Continue your current warfarin dose 10 mg on monday/thursday and 12.5 mg all othe rdays    Instructed patient to follow up no later than: 4 weeks  Lab visit scheduled    Education provided: None required      Tone verbalizes understanding and agrees to warfarin dosing plan.    Instructed to call the Anticoagulation Clinic for any changes, questions or concerns. (#[phone])        OBJECTIVE:  INR   Date Value Ref Range Status   04/20/2020 4.97 (H) 0.86 - 1.14 Corrected     Comment:     NOTIFIED DR ALANA SHARPE @2145 04.20.20 [facility]. BY EK  CORRECTED ON 04/20 AT 2146: PREVIOUSLY REPORTED AS 8.09 Critical Value called   to and read back by DR LEISA ESTRELLA @2125 04.20.20 LAB. BY EK       Chromogenic Factor 10   Date Value Ref Range Status   07/07/2020 28 (L) 70 - 130 % Final     Comment:     Therapeutic Range:  A Chromogenic Factor 10 level of approximately 20-40%   inversely correlates with an INR of 2-3 for patients receiving Warfarin.   Chromogenic Factor 10 levels below 20% indicate an INR greater than 3 and    levels above 40% indicate an INR less than 2.       Factor 2 Assay   Date Value Ref Range Status   02/13/2012 36 (L) 60 - 140 % Final         No question data found.  Anticoagulation Summary  As of 7/7/2020    INR goal:   2.0-3.0   TTR:   47.9 % (5 mo)   INR used for dosing:      Warfarin maintenance plan:   10 mg (5 mg x 2) every Mon, Thu; 12.5 mg (5 mg x 2.5) all other days   Full warfarin instructions:   10 mg every Mon, Thu; 12.5 mg all other days   Weekly warfarin total:   82.5 mg   Plan last modified:   Priscila Plasencia, [facility] (11/29/2019)   Next INR check:   8/4/2020   Priority:   Maintenance   Target end date:   Indefinite    Indications    Antiphospholipid antibody syndrome (H) [D68.61]  Chronic anticoagulation [Z79.01]             Anticoagulation Episode Summary     INR check location:       Preferred lab:       Send INR reminders to:   LOUIS RIZZO    Comments:   Chromogenic Factor X (20-40%) per Roque Kidd PA-C * transition from Xarelto;  Patient gives permission to leave a detailed message.

## 2020-07-15 DIAGNOSIS — E78.2 MIXED HYPERLIPIDEMIA: ICD-10-CM

## 2020-07-15 DIAGNOSIS — I10 BENIGN ESSENTIAL HYPERTENSION: ICD-10-CM

## 2020-07-16 RX ORDER — LOSARTAN POTASSIUM 100 MG/1
TABLET ORAL
Qty: 30 TABLET | Refills: 0 | Status: SHIPPED | OUTPATIENT
Start: 2020-07-16 | End: 2020-08-17

## 2020-07-16 RX ORDER — ATORVASTATIN CALCIUM 10 MG/1
TABLET, FILM COATED ORAL
Qty: 90 TABLET | Refills: 0 | Status: SHIPPED | OUTPATIENT
Start: 2020-07-16 | End: 2020-09-29

## 2020-07-16 RX ORDER — HYDROCHLOROTHIAZIDE 25 MG/1
TABLET ORAL
Qty: 30 TABLET | Refills: 0 | Status: SHIPPED | OUTPATIENT
Start: 2020-07-16 | End: 2020-08-17

## 2020-07-16 NOTE — TELEPHONE ENCOUNTER
Routing refill request to provider for review/approval because:  B/P not at goal  BP Readings from Last 3 Encounters:   02/24/20 (!) 147/90   10/28/19 (!) 150/89   10/11/19 (!) 151/97

## 2020-07-16 NOTE — TELEPHONE ENCOUNTER
Atorvastatin- Prescription approved per [facility].

## 2020-07-16 NOTE — TELEPHONE ENCOUNTER
Reason for call:  Medication   If this is a refill request, has the caller requested the refill from the pharmacy already? Yes  Will the patient be using a [facility]? No  Name of the pharmacy and phone number for the current request: [facility] on [facility]    Name of the medication requested: Hydrochlorothiazide, Losartan, and Atorvastatin     Other request: Is requesting refills on the three medications.    Phone number to reach patient:  Home number on file [phone] (home)    Best Time:  Anytime    Can we leave a detailed message on this number?  YES    Travel screening: Not Applicable

## 2020-07-16 NOTE — TELEPHONE ENCOUNTER
Medication is being filled for 1 time refill only due to:  Patient needs to be seen because it has been more than one year since last visit.Due  For OV and labs.  Kaylie Wilde RN

## 2020-08-04 ENCOUNTER — ANTICOAGULATION THERAPY VISIT (OUTPATIENT)
Dept: NURSING | Facility: CLINIC | Age: 30
End: 2020-08-04

## 2020-08-04 DIAGNOSIS — Z86.711 HISTORY OF PULMONARY EMBOLISM: ICD-10-CM

## 2020-08-04 DIAGNOSIS — E29.1 HYPOGONADISM MALE: ICD-10-CM

## 2020-08-04 DIAGNOSIS — R79.89 LOW SERUM TESTOSTERONE: ICD-10-CM

## 2020-08-04 DIAGNOSIS — D68.61 ANTIPHOSPHOLIPID ANTIBODY SYNDROME (H): ICD-10-CM

## 2020-08-04 DIAGNOSIS — Z79.01 CHRONIC ANTICOAGULATION: ICD-10-CM

## 2020-08-04 DIAGNOSIS — Z86.73 HISTORY OF CVA (CEREBROVASCULAR ACCIDENT): ICD-10-CM

## 2020-08-04 LAB — FACT X ACT/NOR PPP CHRO: 30 % (ref 70–130)

## 2020-08-04 PROCEDURE — 36415 COLL VENOUS BLD VENIPUNCTURE: CPT | Performed by: FAMILY MEDICINE

## 2020-08-04 PROCEDURE — 84270 ASSAY OF SEX HORMONE GLOBUL: CPT | Performed by: INTERNAL MEDICINE

## 2020-08-04 PROCEDURE — 84403 ASSAY OF TOTAL TESTOSTERONE: CPT | Performed by: INTERNAL MEDICINE

## 2020-08-04 PROCEDURE — 85260 CLOT FACTOR X STUART-POWER: CPT | Performed by: FAMILY MEDICINE

## 2020-08-04 NOTE — PROGRESS NOTES
Anticoagulation Monitoring Factor X   Latest Ref Rng & Units 70 - 130 %   8/4/2020 30 (L)   7/7/2020 28 (L)   7/7/2020 6/17/2020 23 (L)   6/17/2020 6/2/2020 18 (L)   6/2/2020 5/19/2020 23 (L)     ANTICOAGULATION MANAGEMENT     Patient Name:  Tone Marley  Date:  8/4/2020    ASSESSMENT /SUBJECTIVE:    Today's FX 10 result of 30% is therapeutic. Goal INR of 20-40%      Warfarin dose taken: Warfarin taken as previously instructed    Diet: No new diet changes affecting INR    Medication changes/ interactions: No new medications/supplements affecting INR    Previous INR: Therapeutic     S/S of bleeding or thromboembolism: No    New injury or illness: No    Upcoming surgery, procedure or cardioversion: No    Additional findings: None      PLAN:    Spoke with Tone regarding INR result and instructed:     Warfarin Dosing Instructions: Continue your current warfarin dose 10 mg M/TH; 12.5 mg ROW    Instructed patient to follow up no later than: 4 weeks  Lab visit scheduled    Education provided: None required      Tone verbalizes understanding and agrees to warfarin dosing plan.    Instructed to call the Anticoagulation Clinic for any changes, questions or concerns. (#[phone])        Elizabeth Head, RN      OBJECTIVE:  Recent labs: (last 7 days)     08/04/20  0812   [id number] 30*         INR assessment THER    Recheck INR In: 4 WEEKS    INR Location Clinic      Anticoagulation Summary  As of 8/4/2020    INR goal:   2.0-3.0   TTR:   47.9 % (5 mo)   INR used for dosing:   No new INR was available at the time of this encounter.   Warfarin maintenance plan:   10 mg (5 mg x 2) every Mon, Thu; 12.5 mg (5 mg x 2.5) all other days   Full warfarin instructions:   10 mg every Mon, Thu; 12.5 mg all other days   Weekly warfarin total:   82.5 mg   No change documented:   Jadyn Cooper RN   Plan last modified:   Priscila Plasencia, [facility] (11/29/2019)   Next INR check:   9/1/2020   Priority:   Maintenance    Target end date:   Indefinite    Indications    Antiphospholipid antibody syndrome (H) [D68.61]  Chronic anticoagulation [Z79.01]             Anticoagulation Episode Summary     INR check location:       Preferred lab:       Send INR reminders to:   LOUIS RIZZO    Comments:   Chromogenic Factor X (20-40%) per Roque Kidd PA-C * transition from Xarelto;  Patient gives permission to leave a detailed message.

## 2020-08-07 ENCOUNTER — MYC MEDICAL ADVICE (OUTPATIENT)
Dept: ENDOCRINOLOGY | Facility: CLINIC | Age: 30
End: 2020-08-07

## 2020-08-07 DIAGNOSIS — Z86.73 HISTORY OF CVA (CEREBROVASCULAR ACCIDENT): ICD-10-CM

## 2020-08-07 DIAGNOSIS — Z79.01 CHRONIC ANTICOAGULATION: ICD-10-CM

## 2020-08-07 DIAGNOSIS — E29.1 HYPOGONADISM MALE: Primary | ICD-10-CM

## 2020-08-07 DIAGNOSIS — R79.89 LOW SERUM TESTOSTERONE: ICD-10-CM

## 2020-08-07 DIAGNOSIS — D68.61 ANTIPHOSPHOLIPID ANTIBODY SYNDROME (H): ICD-10-CM

## 2020-08-07 LAB
SHBG SERPL-SCNC: 13 NMOL/L (ref 11–80)
TESTOST FREE SERPL-MCNC: 4.8 NG/DL (ref 4.7–24.4)
TESTOST SERPL-MCNC: 166 NG/DL (ref 240–950)

## 2020-08-14 DIAGNOSIS — D68.61 ANTIPHOSPHOLIPID ANTIBODY SYNDROME (H): ICD-10-CM

## 2020-08-14 DIAGNOSIS — Z86.73 HISTORY OF CVA (CEREBROVASCULAR ACCIDENT): ICD-10-CM

## 2020-08-14 DIAGNOSIS — Z79.01 CHRONIC ANTICOAGULATION: ICD-10-CM

## 2020-08-14 DIAGNOSIS — I10 BENIGN ESSENTIAL HYPERTENSION: ICD-10-CM

## 2020-08-14 DIAGNOSIS — Z86.711 HISTORY OF PULMONARY EMBOLISM: ICD-10-CM

## 2020-08-17 RX ORDER — LOSARTAN POTASSIUM 100 MG/1
TABLET ORAL
Qty: 30 TABLET | Refills: 0 | Status: SHIPPED | OUTPATIENT
Start: 2020-08-17 | End: 2020-09-14

## 2020-08-17 RX ORDER — WARFARIN SODIUM 10 MG/1
TABLET ORAL
Qty: 30 TABLET | Refills: 0 | Status: SHIPPED | OUTPATIENT
Start: 2020-08-17 | End: 2020-09-14

## 2020-08-17 RX ORDER — HYDROCHLOROTHIAZIDE 25 MG/1
TABLET ORAL
Qty: 30 TABLET | Refills: 0 | Status: SHIPPED | OUTPATIENT
Start: 2020-08-17 | End: 2020-09-14

## 2020-08-17 NOTE — TELEPHONE ENCOUNTER
Medication is being filled for 1 time refill only due to:  Patient needs to be seen because due for physical and labs this month.   Lala GOMEZ RN

## 2020-08-25 ENCOUNTER — ANTICOAGULATION THERAPY VISIT (OUTPATIENT)
Dept: FAMILY MEDICINE | Facility: CLINIC | Age: 30
End: 2020-08-25

## 2020-08-25 DIAGNOSIS — E29.1 HYPOGONADISM MALE: ICD-10-CM

## 2020-08-25 DIAGNOSIS — R79.89 LOW SERUM TESTOSTERONE: ICD-10-CM

## 2020-08-25 DIAGNOSIS — Z79.01 CHRONIC ANTICOAGULATION: ICD-10-CM

## 2020-08-25 DIAGNOSIS — Z86.73 HISTORY OF CVA (CEREBROVASCULAR ACCIDENT): ICD-10-CM

## 2020-08-25 DIAGNOSIS — Z86.711 HISTORY OF PULMONARY EMBOLISM: ICD-10-CM

## 2020-08-25 DIAGNOSIS — D68.61 ANTIPHOSPHOLIPID ANTIBODY SYNDROME (H): ICD-10-CM

## 2020-08-25 LAB — FACT X ACT/NOR PPP CHRO: 26 % (ref 70–130)

## 2020-08-25 PROCEDURE — 85260 CLOT FACTOR X STUART-POWER: CPT | Performed by: INTERNAL MEDICINE

## 2020-08-25 PROCEDURE — 84403 ASSAY OF TOTAL TESTOSTERONE: CPT | Performed by: INTERNAL MEDICINE

## 2020-08-25 PROCEDURE — 36415 COLL VENOUS BLD VENIPUNCTURE: CPT | Performed by: INTERNAL MEDICINE

## 2020-08-25 PROCEDURE — 84270 ASSAY OF SEX HORMONE GLOBUL: CPT | Performed by: INTERNAL MEDICINE

## 2020-08-25 NOTE — PROGRESS NOTES
ANTICOAGULATION MANAGEMENT     Patient Name:  Tone Marley  Date:  8/25/2020    ASSESSMENT /SUBJECTIVE:    Today's CF 10 result of 26 is therapeutic. Goal CF 10 of 40-20%      Warfarin dose taken: Warfarin taken as previously instructed    Diet: No new diet changes affecting INR    Medication changes/ interactions: No new medications/supplements affecting INR    Previous INR: Therapeutic     S/S of bleeding or thromboembolism: No    New injury or illness: No    Upcoming surgery, procedure or cardioversion: No    Additional findings: None      PLAN:    Spoke with Tone regarding INR result and instructed:     Warfarin Dosing Instructions: Continue your current warfarin dose 10 mg Mon and Fri and 12.5 mg daily all other days of the week.    Instructed patient to follow up no later than: 4 weeks  Lab visit scheduled    Education provided: None required      James verbalizes understanding and agrees to warfarin dosing plan.    Instructed to call the Anticoagulation Clinic for any changes, questions or concerns. (#[phone])        [facility]      OBJECTIVE:  Recent labs: (last 7 days)     08/25/20  0907   [id number] 26*         No question data found.  Anticoagulation Summary  As of 8/25/2020    INR goal:   2.0-3.0   TTR:   47.9 % (5 mo)   INR used for dosing:      Warfarin maintenance plan:   10 mg (5 mg x 2) every Mon, Thu; 12.5 mg (5 mg x 2.5) all other days   Full warfarin instructions:   10 mg every Mon, Thu; 12.5 mg all other days   Weekly warfarin total:   82.5 mg   Plan last modified:   [facility] (11/29/2019)   Next INR check:      Priority:   Maintenance   Target end date:   Indefinite    Indications    Antiphospholipid antibody syndrome (H) [D68.61]  Chronic anticoagulation [Z79.01]             Anticoagulation Episode Summary     INR check location:       Preferred lab:       Send INR reminders to:   LOUIS RIZZO    Comments:   Chromogenic Factor X (20-40%) per Roque Kidd  ERIC * transition from Xarelto;  Patient gives permission to leave a detailed message.

## 2020-08-26 LAB
SHBG SERPL-SCNC: 12 NMOL/L (ref 11–80)
TESTOST FREE SERPL-MCNC: 15.06 NG/DL (ref 4.7–24.4)
TESTOST SERPL-MCNC: 472 NG/DL (ref 240–950)

## 2020-09-22 ENCOUNTER — TELEPHONE (OUTPATIENT)
Dept: FAMILY MEDICINE | Facility: CLINIC | Age: 30
End: 2020-09-22

## 2020-09-22 ENCOUNTER — ANTICOAGULATION THERAPY VISIT (OUTPATIENT)
Dept: FAMILY MEDICINE | Facility: CLINIC | Age: 30
End: 2020-09-22

## 2020-09-22 DIAGNOSIS — D68.61 ANTIPHOSPHOLIPID ANTIBODY SYNDROME (H): ICD-10-CM

## 2020-09-22 DIAGNOSIS — I10 ESSENTIAL HYPERTENSION, BENIGN: ICD-10-CM

## 2020-09-22 DIAGNOSIS — Z86.711 HISTORY OF PULMONARY EMBOLISM: ICD-10-CM

## 2020-09-22 DIAGNOSIS — E78.2 MIXED HYPERLIPIDEMIA: ICD-10-CM

## 2020-09-22 DIAGNOSIS — E78.2 MIXED HYPERLIPIDEMIA: Primary | ICD-10-CM

## 2020-09-22 DIAGNOSIS — Z79.01 CHRONIC ANTICOAGULATION: ICD-10-CM

## 2020-09-22 LAB — FACT X ACT/NOR PPP CHRO: 32 % (ref 70–130)

## 2020-09-22 PROCEDURE — 80061 LIPID PANEL: CPT | Performed by: FAMILY MEDICINE

## 2020-09-22 PROCEDURE — 80048 BASIC METABOLIC PNL TOTAL CA: CPT | Performed by: FAMILY MEDICINE

## 2020-09-22 PROCEDURE — 85260 CLOT FACTOR X STUART-POWER: CPT | Performed by: FAMILY MEDICINE

## 2020-09-22 PROCEDURE — 36415 COLL VENOUS BLD VENIPUNCTURE: CPT | Performed by: FAMILY MEDICINE

## 2020-09-22 NOTE — TELEPHONE ENCOUNTER
A.S.    Patient no show for his physical with you yesterday.  Rescheduled for 9/29    [facility] called.  Patient there to have Lipids done.  They need an order.    Thanks,  Kaylie Wilde RN

## 2020-09-22 NOTE — TELEPHONE ENCOUNTER
Re.  I have left a VM for patient- to inform him about following labs- not sure what was his expectation- will review at his appointment on 9/29.1. Mixed hyperlipidemia    - Lipid panel reflex to direct LDL Fasting; Future    2. Essential hypertension, benign    - Basic metabolic panel  (Ca, Cl, CO2, Creat, Gluc, K, Na, BUN); Future    Done thanks

## 2020-09-22 NOTE — TELEPHONE ENCOUNTER
Reason for Call: Request for an order or referral:    Order or referral being requested: in [facility] lab this morning they need lipid order    Date needed: as soon as possible    Has the patient been seen by the PCP for this problem? YES    Additional comments:     Phone number Patient can be reached at:  Home number on file [phone] (home)    Best Time:      Can we leave a detailed message on this number?  YES    Call taken on 9/22/2020 at 8:25 AM by Yuni Bullard

## 2020-09-22 NOTE — PROGRESS NOTES
Anticoagulation Monitoring Factor X   Latest Ref Rng & Units 70 - 130 %   9/22/2020 32 (L)   8/25/2020 26 (L)   8/25/2020 8/4/2020 30 (L)   8/4/2020 7/7/2020 28 (L)     ANTICOAGULATION MANAGEMENT     Patient Name:  Tone Marley  Date:  9/22/2020    ASSESSMENT /SUBJECTIVE:    Today's FX 10 result of 32% is therapeutic. Goal INR of 20-40%      Warfarin dose taken: Warfarin taken as instructed    Diet: No new diet changes affecting INR    Medication changes/ interactions: No new medications/supplements affecting INR    Previous INR: Therapeutic     S/S of bleeding or thromboembolism: No    New injury or illness: No    Upcoming surgery, procedure or cardioversion: No    Additional findings: None      PLAN:    Telephone call with Tone regarding INR result and instructed:     Warfarin Dosing Instructions: Continue maintenance dosing of 10 mg MTH; 12.5 mg ROW    Instructed patient to follow up no later than: 4 weeks  Lab visit scheduled    Education provided: None required      Tone verbalizes understanding and agrees to warfarin dosing plan.    Instructed to call the Anticoagulation Clinic for any changes, questions or concerns. (#[phone])        Elizabeth Head RN      OBJECTIVE:  Recent labs: (last 7 days)     09/22/20  0757   [id number] 32*         No question data found.  Anticoagulation Summary  As of 9/22/2020    INR goal:   2.0-3.0   TTR:   47.9 % (5 mo)   INR used for dosing:      Warfarin maintenance plan:   10 mg (5 mg x 2) every Mon, Thu; 12.5 mg (5 mg x 2.5) all other days   Full warfarin instructions:   10 mg every Mon, Thu; 12.5 mg all other days   Weekly warfarin total:   82.5 mg   Plan last modified:   Priscila Plasencia, [facility] (11/29/2019)   Next INR check:      Priority:   Maintenance   Target end date:   Indefinite    Indications    Antiphospholipid antibody syndrome (H) [D68.61]  Chronic anticoagulation [Z79.01]             Anticoagulation Episode Summary     INR check location:        Preferred lab:       Send INR reminders to:   LOUIS RIZZO    Comments:   Chromogenic Factor X (20-40%) per Roque Kidd PA-C * transition from Xarelto;  Patient gives permission to leave a detailed message.

## 2020-09-23 LAB
ANION GAP SERPL CALCULATED.3IONS-SCNC: 8 MMOL/L (ref 3–14)
BUN SERPL-MCNC: 14 MG/DL (ref 7–30)
CALCIUM SERPL-MCNC: 9.1 MG/DL (ref 8.5–10.1)
CHLORIDE SERPL-SCNC: 107 MMOL/L (ref 94–109)
CHOLEST SERPL-MCNC: 99 MG/DL
CO2 SERPL-SCNC: 23 MMOL/L (ref 20–32)
CREAT SERPL-MCNC: 1.11 MG/DL (ref 0.66–1.25)
GFR SERPL CREATININE-BSD FRML MDRD: 87 ML/MIN/{1.73_M2}
GLUCOSE SERPL-MCNC: 88 MG/DL (ref 70–99)
HDLC SERPL-MCNC: 40 MG/DL
LDLC SERPL CALC-MCNC: 44 MG/DL
NONHDLC SERPL-MCNC: 59 MG/DL
POTASSIUM SERPL-SCNC: 3.8 MMOL/L (ref 3.4–5.3)
SODIUM SERPL-SCNC: 138 MMOL/L (ref 133–144)
TRIGL SERPL-MCNC: 76 MG/DL

## 2020-09-29 ENCOUNTER — OFFICE VISIT (OUTPATIENT)
Dept: FAMILY MEDICINE | Facility: CLINIC | Age: 30
End: 2020-09-29
Payer: COMMERCIAL

## 2020-09-29 VITALS
BODY MASS INDEX: 33.46 KG/M2 | OXYGEN SATURATION: 95 % | SYSTOLIC BLOOD PRESSURE: 125 MMHG | HEART RATE: 64 BPM | HEIGHT: 72 IN | RESPIRATION RATE: 16 BRPM | DIASTOLIC BLOOD PRESSURE: 78 MMHG | WEIGHT: 247 LBS

## 2020-09-29 DIAGNOSIS — Z86.711 HISTORY OF PULMONARY EMBOLISM: ICD-10-CM

## 2020-09-29 DIAGNOSIS — I10 BENIGN ESSENTIAL HYPERTENSION: ICD-10-CM

## 2020-09-29 DIAGNOSIS — E66.01 MORBID OBESITY (H): ICD-10-CM

## 2020-09-29 DIAGNOSIS — Z79.01 CHRONIC ANTICOAGULATION: ICD-10-CM

## 2020-09-29 DIAGNOSIS — Z00.00 ROUTINE GENERAL MEDICAL EXAMINATION AT A HEALTH CARE FACILITY: Primary | ICD-10-CM

## 2020-09-29 DIAGNOSIS — F90.0 ATTENTION DEFICIT HYPERACTIVITY DISORDER (ADHD), PREDOMINANTLY INATTENTIVE TYPE: ICD-10-CM

## 2020-09-29 DIAGNOSIS — E29.1 HYPOGONADISM MALE: ICD-10-CM

## 2020-09-29 DIAGNOSIS — D68.61 ANTIPHOSPHOLIPID SYNDROME (H): ICD-10-CM

## 2020-09-29 DIAGNOSIS — E78.2 MIXED HYPERLIPIDEMIA: ICD-10-CM

## 2020-09-29 PROCEDURE — 99395 PREV VISIT EST AGE 18-39: CPT | Mod: 25 | Performed by: FAMILY MEDICINE

## 2020-09-29 PROCEDURE — 90686 IIV4 VACC NO PRSV 0.5 ML IM: CPT | Performed by: FAMILY MEDICINE

## 2020-09-29 PROCEDURE — 90471 IMMUNIZATION ADMIN: CPT | Performed by: FAMILY MEDICINE

## 2020-09-29 RX ORDER — LOSARTAN POTASSIUM 100 MG/1
100 TABLET ORAL DAILY
Qty: 90 TABLET | Refills: 3 | Status: SHIPPED | OUTPATIENT
Start: 2020-09-29 | End: 2021-10-14

## 2020-09-29 RX ORDER — ATORVASTATIN CALCIUM 10 MG/1
10 TABLET, FILM COATED ORAL DAILY
Qty: 90 TABLET | Refills: 3 | Status: SHIPPED | OUTPATIENT
Start: 2020-09-29 | End: 2021-10-14

## 2020-09-29 RX ORDER — HYDROCHLOROTHIAZIDE 25 MG/1
25 TABLET ORAL DAILY
Qty: 90 TABLET | Refills: 3 | Status: SHIPPED | OUTPATIENT
Start: 2020-09-29 | End: 2021-10-14

## 2020-09-29 ASSESSMENT — MIFFLIN-ST. JEOR: SCORE: 2100.44

## 2020-09-29 NOTE — PROGRESS NOTES
SUBJECTIVE:   CC: Tone Marley is an 30 year old male who presents for preventative health visit.   28 yo male with known history of hypertension hyperlipidemia obesity with intentional weight loss, antiphospholipid antibody syndrome diagnosed around age 13 after lower extremity DVT and pulmonary embolism, history of recurrent DVT currently on warfarin with plans to continue long-term.  History of PE May 2002 and December 2013.  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  He has been stable on his current medication and had recent blood work, would like long-term  refill on all medications.  He has been working with [employer] for hypogonadism and monitored for weekly testosterone.    He reports overall doing very well with the intentional weight loss, routine workout at 6:30 in the morning,.  He is happy with his current weight loss.  He is motivated to continue with that.  He does report working remotely has been stressful quadrant has been stressful unable to meet new people.  Denies any concerns about depression anxiety.      Patient has been advised of split billing requirements and indicates understanding: Yes  Healthy Habits:     Getting at least 3 servings of Calcium per day:  Yes    Bi-annual eye exam:  NO    Dental care twice a year:  NO    Sleep apnea or symptoms of sleep apnea:  None    Diet:  Carbohydrate counting    Frequency of exercise:  4-5 days/week    Duration of exercise:  Greater than 60 minutes    Taking medications regularly:  Yes    Medication side effects:  None    PHQ-2 Total Score: 1    Additional concerns today:  No          PROBLEMS TO ADD ON...    Today's PHQ-2 Score:   PHQ-2 ( 1999 [employer]) 9/28/2020   Q1: Little interest or pleasure in doing things 0   Q2: Feeling down, depressed or hopeless 1   PHQ-2 Score 1   Q1: Little interest or pleasure in doing things Not at all   Q2: Feeling down, depressed or  hopeless Several days   PHQ-2 Score 1       Abuse: Current or Past(Physical, Sexual or Emotional)- No  Do you feel safe in your environment? Yes        Social History     Tobacco Use     Smoking status: Never Smoker     Smokeless tobacco: Former User     Types: Chew     Tobacco comment: Chew maybe once per week.   Substance Use Topics     Alcohol use: No     Alcohol/week: 0.0 standard drinks     Comment:  quit post stroke 7-2018     If you drink alcohol do you typically have >3 drinks per day or >7 drinks per week? No    No flowsheet data found.    Last PSA:   PSA   Date Value Ref Range Status   10/30/2019 0.59 0 - 4 ug/L Final     Comment:     Assay Method:  Chemiluminescence using Siemens Vista analyzer       Reviewed orders with patient. Reviewed health maintenance and updated orders accordingly - Yes  BP Readings from Last 3 Encounters:   09/29/20 125/78   02/24/20 (!) 147/90   10/28/19 (!) 150/89    Wt Readings from Last 3 Encounters:   09/29/20 112 kg (247 lb)   02/24/20 117.9 kg (260 lb)   10/28/19 122.7 kg (270 lb 9.6 oz)                    Reviewed and updated as needed this visit by clinical staff  Tobacco  Allergies  Meds  Problems  Med Hx  Surg Hx  Fam Hx         Reviewed and updated as needed this visit by Provider            Review of Systems  CONSTITUTIONAL: NEGATIVE for fever, chills, change in weight  INTEGUMENTARY/SKIN: NEGATIVE for worrisome rashes, moles or lesions  EYES: NEGATIVE for vision changes or irritation  ENT: NEGATIVE for ear, mouth and throat problems  RESP: NEGATIVE for significant cough or SOB  CV: NEGATIVE for chest pain, palpitations or peripheral edema  GI: NEGATIVE for nausea, abdominal pain, heartburn, or change in bowel habits   male: negative for dysuria, hematuria, decreased urinary stream, erectile dysfunction, urethral discharge  MUSCULOSKELETAL: NEGATIVE for significant arthralgias or myalgia  NEURO: NEGATIVE for weakness, dizziness or paresthesias  PSYCHIATRIC:  "NEGATIVE for changes in mood or affect    OBJECTIVE:   /78   Pulse 64   Resp 16   Ht 1.816 m (5' 11.5\")   Wt 112 kg (247 lb)   SpO2 95%   BMI 33.97 kg/m      Physical Exam  GENERAL: healthy, alert and no distress  EYES: Eyes grossly normal to inspection, PERRL and conjunctivae and sclerae normal  HENT: ear canals and TM's normal, nose and mouth without ulcers or lesions  NECK: no adenopathy, no asymmetry, masses, or scars and thyroid normal to palpation  RESP: lungs clear to auscultation - no rales, rhonchi or wheezes  CV: regular rate and rhythm, normal S1 S2, no S3 or S4, no murmur, click or rub, no peripheral edema and peripheral pulses strong  ABDOMEN: soft, nontender, no hepatosplenomegaly, no masses and bowel sounds normal  MS: no gross musculoskeletal defects noted, no edema  SKIN: no suspicious lesions or rashes  NEURO: Normal strength and tone, mentation intact and speech normal  PSYCH: mentation appears normal, affect normal/bright    Diagnostic Test Results:  Labs reviewed in Epic  No results found for this or any previous visit (from the past 24 hour(s)).    ASSESSMENT/PLAN:   Tone was seen today for physical.  1 yo male with known history of hypertension hyperlipidemia obesity with intentional weight loss, antiphospholipid antibody syndrome diagnosed around age 13 after lower extremity DVT and pulmonary embolism, history of recurrent DVT currently on warfarin with plans to continue long-term.   Diagnoses and all orders for this visit:    Routine general medical examination at a health care facility  -     [facility]FLUENZA VACCINE IM > 6 MON[facility]S [id number]  -     EYE ADULT REFERRAL; Future    Benign essential hypertension  Comments:  Controlled- on current medications- refilled for 1 yr  Orders:  -     hydrochlorothiazide (HYDRODIURIL) 25 MG tablet; Take 1 tablet (25 mg) by mouth daily  -     losartan (COZAAR) 100 MG tablet; Take 1 tablet (100 mg) by mouth daily  Recent CMP-reviewed with " "patient     Mixed hyperlipidemia  Comments:  Improved & stable - with life style changes and current medication- refilled for 1 yr  Orders:  -     atorvastatin (LIPITOR) 10 MG tablet; Take 1 tablet (10 mg) by mouth daily  Recent Labs   Lab Test 09/22/20  0757 08/20/19  0928  08/14/13  1223 06/11/13  1053   CHOL 99 139   < > 187 193   HDL 40 34*   < > 32* 32*   LDL 44 71   < > 113 135*   TRIG 76 171*   < > 212* 130   CHOLHDLRATIO  --   --   --  5.9* 6.1*    < > = values in this interval not displayed.       Morbid obesity (H)  Comments:  Intentional weight loss of > 20 lbs in past 11 month- commendable work out discipline- in am and has been watching diet carefully.      Antiphospholipid syndrome (H)  Comments:  Multiple DVT/ PE- stable- on current chronic anticoagulation    History of pulmonary embolism    Chronic anticoagulation  Comments:  Monitored routinely    Hypogonadism male  Comments:  undercare of ENDO- prescribing weekly testosterone    Attention deficit hyperactivity disorder (ADHD), predominantly inattentive type  Comments:  self care- no current medications.          Patient has been advised of split billing requirements and indicates understanding: Yes  COUNSELING:   Reviewed preventive health counseling, as reflected in patient instructions       Regular exercise       Healthy diet/nutrition       Vision screening       Hearing screening    Estimated body mass index is 33.97 kg/m  as calculated from the following:    Height as of this encounter: 1.816 m (5' 11.5\").    Weight as of this encounter: 112 kg (247 lb).     Weight management plan: Discussed healthy diet and exercise guidelines    He reports that he has never smoked. He quit smokeless tobacco use about 2 years ago.  His smokeless tobacco use included chew.      Counseling Resources:  ATP IV Guidelines  Pooled Cohorts Equation Calculator  FRAX Risk Assessment  ICSI Preventive Guidelines  Dietary Guidelines for Americans, 2010  USDA's " MyPlate  ASA Prophylaxis  Lung CA Screening    Yara Faust MD  [facility]

## 2020-10-13 DIAGNOSIS — Z86.711 HISTORY OF PULMONARY EMBOLISM: ICD-10-CM

## 2020-10-13 DIAGNOSIS — Z86.73 HISTORY OF CVA (CEREBROVASCULAR ACCIDENT): ICD-10-CM

## 2020-10-13 DIAGNOSIS — D68.61 ANTIPHOSPHOLIPID ANTIBODY SYNDROME (H): ICD-10-CM

## 2020-10-13 DIAGNOSIS — Z79.01 CHRONIC ANTICOAGULATION: ICD-10-CM

## 2020-10-14 RX ORDER — WARFARIN SODIUM 10 MG/1
TABLET ORAL
Qty: 45 TABLET | Refills: 1 | Status: SHIPPED | OUTPATIENT
Start: 2020-10-14 | End: 2021-01-11

## 2020-10-14 NOTE — TELEPHONE ENCOUNTER
Warfarin 10 mg:    Prescription approved per [facility].  Kaylie Wilde RN

## 2020-10-20 ENCOUNTER — ANTICOAGULATION THERAPY VISIT (OUTPATIENT)
Dept: FAMILY MEDICINE | Facility: CLINIC | Age: 30
End: 2020-10-20

## 2020-10-20 DIAGNOSIS — Z79.01 CHRONIC ANTICOAGULATION: ICD-10-CM

## 2020-10-20 DIAGNOSIS — Z86.711 HISTORY OF PULMONARY EMBOLISM: ICD-10-CM

## 2020-10-20 DIAGNOSIS — D68.61 ANTIPHOSPHOLIPID ANTIBODY SYNDROME (H): ICD-10-CM

## 2020-10-20 DIAGNOSIS — D68.61 ANTIPHOSPHOLIPID ANTIBODY SYNDROME (H): Primary | ICD-10-CM

## 2020-10-20 LAB — FACT X ACT/NOR PPP CHRO: 30 % (ref 70–130)

## 2020-10-20 PROCEDURE — 82306 VITAMIN D 25 HYDROXY: CPT | Performed by: FAMILY MEDICINE

## 2020-10-20 PROCEDURE — 36415 COLL VENOUS BLD VENIPUNCTURE: CPT | Performed by: FAMILY MEDICINE

## 2020-10-20 NOTE — PROGRESS NOTES
Anticoagulation Monitoring Factor X   Latest Ref Rng & Units 70 - 130 %   10/20/2020 30 (L)   10/20/2020    9/22/2020 32 (L)   9/22/2020 8/25/2020 26 (L)   8/25/2020 8/4/2020 30 (L)   8/4/2020      ANTICOAGULATION MANAGEMENT     Patient Name:  Tone Marley  Date:  10/20/2020    ASSESSMENT /SUBJECTIVE:    Today's FX 10 result of 30% is therapeutic. Goal INR of 20-40%      Warfarin dose taken: Warfarin taken as instructed    Diet: No new diet changes affecting INR    Medication changes/ interactions: No new medications/supplements affecting INR    Previous INR: Therapeutic     S/S of bleeding or thromboembolism: No    New injury or illness: No    Upcoming surgery, procedure or cardioversion: No    Additional findings: None      PLAN:    Telephone call with Tone regarding INR result and instructed:     Warfarin Dosing Instructions: Continue your current warfarin dose 10 mg M,TH; 12.5 mg ROW    Instructed patient to follow up no later than: 4 weeks  Lab visit scheduled    Education provided: None required      Tone verbalizes understanding and agrees to warfarin dosing plan.    Instructed to call the Anticoagulation Clinic for any changes, questions or concerns. (#[phone])        Elizabeth Head RN      OBJECTIVE:  Recent labs: (last 7 days)     10/20/20  0824   [id number] 30*         No question data found.  Anticoagulation Summary  As of 10/20/2020    INR goal:  2.0-3.0   TTR:  47.9 % (5 mo)   INR used for dosing:  No new INR was available at the time of this encounter.   Warfarin maintenance plan:  10 mg (5 mg x 2) every Mon, Thu; 12.5 mg (5 mg x 2.5) all other days   Full warfarin instructions:  10 mg every Mon, Thu; 12.5 mg all other days   Weekly warfarin total:  82.5 mg   Plan last modified:  Priscila Plasencia, [facility] (11/29/2019)   Next INR check:  11/17/2020   Priority:  Maintenance   Target end date:  Indefinite    Indications    Antiphospholipid antibody syndrome (H) [D68.61]  Chronic  anticoagulation [Z79.01]             Anticoagulation Episode Summary     INR check location:      Preferred lab:      Send INR reminders to:  LOUIS RIZZO    Comments:  Chromogenic Factor X (20-40%) per Roque Kidd PA-C * transition from Xarelto;  Patient gives permission to leave a detailed message.

## 2020-10-20 NOTE — PROGRESS NOTES
ANTICOAGULATION MANAGEMENT      Tone Marley due for annual renewal of referral to anticoagulation monitoring. Order pended for your review and signature.      ANTICOAGULATION SUMMARY      Warfarin indication(s)     antiphospholipid antibody syndrome    Heart valve present?  NO       Current goal range   INR: 2.0-3.0     Goal appropriate for indication? Yes, INR 2-3 appropriate for hx of DVT, PE, hypercoagulable state, Afib, LVAD, or bileaflet AVR without risk factors     Current duration of therapy Indefinite/long term therapy   Time in Therapeutic Range (TTR)  (Goal > 60%) 47.9 %       Office visit with referring provider's group within last year yes on 9/29/20       Elizabeth Head RN

## 2020-11-13 ENCOUNTER — MYC MEDICAL ADVICE (OUTPATIENT)
Dept: ENDOCRINOLOGY | Facility: CLINIC | Age: 30
End: 2020-11-13

## 2020-11-13 DIAGNOSIS — R79.89 LOW SERUM TESTOSTERONE: Primary | ICD-10-CM

## 2020-11-17 ENCOUNTER — ANTICOAGULATION THERAPY VISIT (OUTPATIENT)
Dept: FAMILY MEDICINE | Facility: CLINIC | Age: 30
End: 2020-11-17

## 2020-11-17 DIAGNOSIS — D68.61 ANTIPHOSPHOLIPID ANTIBODY SYNDROME (H): ICD-10-CM

## 2020-11-17 DIAGNOSIS — Z86.711 HISTORY OF PULMONARY EMBOLISM: ICD-10-CM

## 2020-11-17 DIAGNOSIS — Z79.01 CHRONIC ANTICOAGULATION: ICD-10-CM

## 2020-11-17 LAB — FACT X ACT/NOR PPP CHRO: 32 % (ref 70–130)

## 2020-11-17 PROCEDURE — 36415 COLL VENOUS BLD VENIPUNCTURE: CPT | Performed by: FAMILY MEDICINE

## 2020-11-17 PROCEDURE — 85260 CLOT FACTOR X STUART-POWER: CPT | Performed by: FAMILY MEDICINE

## 2020-11-17 NOTE — PROGRESS NOTES
Anticoagulation Monitoring Factor X   Latest Ref Rng & Units 70 - 130 %   11/17/2020 32 (L)   11/17/2020    10/20/2020 30 (L)   10/20/2020    9/22/2020 32 (L)   9/22/2020      ANTICOAGULATION MANAGEMENT     Patient Name:  Tone Marley  Date:  11/17/2020    ASSESSMENT /SUBJECTIVE:    Today's Fx 10 result of 32% is therapeutic. Goal INR of 20-40%      Warfarin dose taken: Warfarin taken as instructed    Diet: No new diet changes affecting INR    Medication changes/ interactions: No new medications/supplements affecting INR    Previous INR: Therapeutic     S/S of bleeding or thromboembolism: No    New injury or illness: No    Upcoming surgery, procedure or cardioversion: No    Additional findings: None      PLAN:    Telephone call with Tone regarding INR result and instructed:     Warfarin Dosing Instructions: Continue your current warfarin dose 10 mg MTH; 12.5 mg ROW    Instructed patient to follow up no later than: 4 weeks  Lab visit scheduled    Education provided: None required      Tone verbalizes understanding and agrees to warfarin dosing plan.    Instructed to call the Anticoagulation Clinic for any changes, questions or concerns. (#[phone])        Elizabeth Head RN      OBJECTIVE:  Recent labs: (last 7 days)     11/17/20  0817   [id number] 32*         No question data found.  Anticoagulation Summary  As of 11/17/2020    INR goal:  2.0-3.0   TTR:  47.9 % (5 mo)   INR used for dosing:  No new INR was available at the time of this encounter.   Warfarin maintenance plan:  10 mg (5 mg x 2) every Mon, Thu; 12.5 mg (5 mg x 2.5) all other days   Full warfarin instructions:  10 mg every Mon, Thu; 12.5 mg all other days   Weekly warfarin total:  82.5 mg   Plan last modified:  Priscila Plasencia, [facility] (11/29/2019)   Next INR check:     Priority:  Maintenance   Target end date:  Indefinite    Indications    Antiphospholipid antibody syndrome (H) [D68.61]  Chronic anticoagulation [Z79.01]              Anticoagulation Episode Summary     INR check location:      Preferred lab:      Send INR reminders to:  LOUIS RIZZO    Comments:  Chromogenic Factor X (20-40%) per Roque Kidd PA-C * transition from Xarelto;  Patient gives permission to leave a detailed message.      Anticoagulation Care Providers     Provider Role Specialty Phone number    Yara Faust MD [facility] [phone]

## 2020-11-17 NOTE — PROGRESS NOTES
Anticoagulation Management    Unable to reach Tone today.    Today's CF10 result of 32% is therapeutic (goal INR of 2.0-3.0).  Result received from: Clinic Lab    Follow up required to assess for changes     Left message to call [phone].      Anticoagulation clinic to follow up    Lei Garrett RN

## 2020-12-11 DIAGNOSIS — E29.1 HYPOGONADISM MALE: ICD-10-CM

## 2020-12-11 DIAGNOSIS — D68.61 ANTIPHOSPHOLIPID ANTIBODY SYNDROME (H): ICD-10-CM

## 2020-12-11 DIAGNOSIS — Z86.711 HISTORY OF PULMONARY EMBOLISM: ICD-10-CM

## 2020-12-11 DIAGNOSIS — Z86.73 HISTORY OF CVA (CEREBROVASCULAR ACCIDENT): ICD-10-CM

## 2020-12-11 DIAGNOSIS — R79.89 LOW SERUM TESTOSTERONE: ICD-10-CM

## 2020-12-11 DIAGNOSIS — Z79.01 CHRONIC ANTICOAGULATION: ICD-10-CM

## 2020-12-11 RX ORDER — TESTOSTERONE CYPIONATE 1000 MG/10ML
150 INJECTION, SOLUTION INTRAMUSCULAR
Qty: 9 ML | Refills: 0 | Status: SHIPPED | OUTPATIENT
Start: 2020-12-11 | End: 2021-02-08

## 2020-12-11 NOTE — TELEPHONE ENCOUNTER
testosterone cypionate (DEPOTESTOSTERONE) 100 MG/ML injection   Inject 1.5 mLs (150 mg) into the muscle every 14 days      Last Written Prescription Date:  6/5/20  Last Fill Quantity: 9 ml,   # refills: 1  Last Office Visit : 2/24/20  Future Office visit:  none    Routing refill request to provider for review/approval because:  Drug not on the [facility], [facility] or [facility] refill protocol

## 2020-12-15 ENCOUNTER — ANTICOAGULATION THERAPY VISIT (OUTPATIENT)
Dept: FAMILY MEDICINE | Facility: CLINIC | Age: 30
End: 2020-12-15

## 2020-12-15 DIAGNOSIS — D68.61 ANTIPHOSPHOLIPID ANTIBODY SYNDROME (H): ICD-10-CM

## 2020-12-15 DIAGNOSIS — Z86.711 HISTORY OF PULMONARY EMBOLISM: ICD-10-CM

## 2020-12-15 DIAGNOSIS — Z79.01 CHRONIC ANTICOAGULATION: ICD-10-CM

## 2020-12-15 LAB — FACT X ACT/NOR PPP CHRO: 26 % (ref 70–130)

## 2020-12-15 PROCEDURE — 85130 CHROMOGENIC SUBSTRATE ASSAY: CPT | Performed by: FAMILY MEDICINE

## 2020-12-15 PROCEDURE — 36415 COLL VENOUS BLD VENIPUNCTURE: CPT | Performed by: FAMILY MEDICINE

## 2020-12-15 PROCEDURE — 99207 PR NO CHARGE NURSE ONLY: CPT | Performed by: FAMILY MEDICINE

## 2020-12-15 NOTE — PROGRESS NOTES
ANTICOAGULATION MANAGEMENT     Patient Name:  Tone Marley  Date:  12/15/2020      Anticoagulation Monitoring Factor X   Latest Ref Rng & Units 70 - 130 %   12/15/2020 26 (L)   11/17/2020 32 (L)   11/17/2020    10/20/2020 30 (L)   10/20/2020    9/22/2020 32 (L)   9/22/2020 8/25/2020 26 (L)   8/25/2020 8/4/2020 30 (L)         Anticoagulation Management    Unable to reach Tone today.    Today's CFX of 26% is therapeutic. Goal of 20-40%.  Result received from: Clinic Lab    Follow up required to confirm warfarin dose taken and assess for changes    Left message to call back [facility] to assess for changes and for dosing. Gave instructions to take usual dose of 12.5 mg tonight. Can continue current dose and recheck in 6 weeks      Anticoagulation clinic to follow up    Lamin Brito RN

## 2020-12-15 NOTE — PROGRESS NOTES
ANTICOAGULATION FOLLOW-UP CLINIC VISIT    Patient Name:  Tone Marley  Date:  12/15/2020  Contact Type:  Telephone/ Patient returned call, he denies any changes. Orders discussed with the patient, he agrees with the plan. Lab appointment scheduled on 1/26/21.    SUBJECTIVE:  Patient Findings     Comments:  The patient was assessed for diet, medication, and activity level changes, missed or extra doses, bruising or bleeding, with no problem findings. Maintenance warfarin dosing was reviewed with patient and will remain on the same dose until next INR check. Patient did not have any questions or concerns.           Clinical Outcomes     Negatives:  Major bleeding event, Thromboembolic event, Anticoagulation-related hospital admission, Anticoagulation-related ED visit, Anticoagulation-related fatality    Comments:  The patient was assessed for diet, medication, and activity level changes, missed or extra doses, bruising or bleeding, with no problem findings. Maintenance warfarin dosing was reviewed with patient and will remain on the same dose until next INR check. Patient did not have any questions or concerns.              OBJECTIVE    Recent labs: (last 7 days)     12/15/20  0820   [id number] 26*       ASSESSMENT / PLAN  INR assessment THER    Recheck INR In: 6 WEEKS    INR Location Outside lab      Anticoagulation Summary  As of 12/15/2020    INR goal:  2.0-3.0   TTR:  52.6 % (4.2 mo)   INR used for dosing:  No new INR was available at the time of this encounter.   Warfarin maintenance plan:  10 mg (5 mg x 2) every Mon, Thu; 12.5 mg (5 mg x 2.5) all other days   Full warfarin instructions:  10 mg every Mon, Thu; 12.5 mg all other days   Weekly warfarin total:  82.5 mg   No change documented:  Luna Arvizu RN   Plan last modified:  Priscila Plasencia [facility] (11/29/2019)   Next INR check:  1/26/2021   Priority:  Maintenance   Target end date:  Indefinite    Indications    Antiphospholipid antibody syndrome (H)  [D68.61]  Chronic anticoagulation [Z79.01]             Anticoagulation Episode Summary     INR check location:      Preferred lab:      Send INR reminders to:  LOUIS RIZZO    Comments:  Chromogenic Factor X (20-40%) per Roque Kidd PA-C * transition from Xarelto;  Patient gives permission to leave a detailed message.      Anticoagulation Care Providers     Provider Role Specialty Phone number    Yara Faust MD Referring [facility] [phone]            See the Encounter Report to view Anticoagulation Flowsheet and Dosing Calendar (Go to Encounters tab in chart review, and find the Anticoagulation Therapy Visit)    Dosage adjustment made based on physician directed care plan.    Luna Arvizu RN

## 2021-01-07 ENCOUNTER — TELEPHONE (OUTPATIENT)
Dept: ENDOCRINOLOGY | Facility: CLINIC | Age: 31
End: 2021-01-07

## 2021-01-07 DIAGNOSIS — R79.89 LOW SERUM TESTOSTERONE: Primary | ICD-10-CM

## 2021-01-07 NOTE — TELEPHONE ENCOUNTER
[facility] Call Center    Phone Message    May a detailed message be left on voicemail: yes     Reason for Call: Other: Pt called in regards to a request that was sent back on 12/14/20 for orders for testosterone labs. Pt had his INR taken 12/15/20 and also had blood drawn for testosterone. Pt stated lab still did not have orders at time of appointment. Please review and follow up with pt     Action Taken: Message routed to:  [facility] Endo    Travel Screening: Not Applicable

## 2021-01-12 DIAGNOSIS — D68.61 ANTIPHOSPHOLIPID ANTIBODY SYNDROME (H): ICD-10-CM

## 2021-01-12 DIAGNOSIS — Z86.711 HISTORY OF PULMONARY EMBOLISM: ICD-10-CM

## 2021-01-12 DIAGNOSIS — R79.89 LOW SERUM TESTOSTERONE: ICD-10-CM

## 2021-01-12 PROCEDURE — 36415 COLL VENOUS BLD VENIPUNCTURE: CPT | Performed by: INTERNAL MEDICINE

## 2021-01-12 PROCEDURE — 99000 SPECIMEN HANDLING OFFICE-LAB: CPT | Performed by: INTERNAL MEDICINE

## 2021-01-12 PROCEDURE — 84270 ASSAY OF SEX HORMONE GLOBUL: CPT | Performed by: INTERNAL MEDICINE

## 2021-01-12 PROCEDURE — 84403 ASSAY OF TOTAL TESTOSTERONE: CPT | Mod: 90 | Performed by: INTERNAL MEDICINE

## 2021-01-14 LAB
SHBG SERPL-SCNC: 15 NMOL/L (ref 11–80)
TESTOST FREE SERPL-MCNC: 13.78 NG/DL (ref 4.7–24.4)
TESTOST SERPL-MCNC: 463 NG/DL (ref 240–950)

## 2021-01-27 ENCOUNTER — ANTICOAGULATION THERAPY VISIT (OUTPATIENT)
Dept: FAMILY MEDICINE | Facility: CLINIC | Age: 31
End: 2021-01-27

## 2021-01-27 DIAGNOSIS — D68.61 ANTIPHOSPHOLIPID ANTIBODY SYNDROME (H): ICD-10-CM

## 2021-01-27 DIAGNOSIS — Z79.01 CHRONIC ANTICOAGULATION: ICD-10-CM

## 2021-01-27 DIAGNOSIS — Z86.711 HISTORY OF PULMONARY EMBOLISM: ICD-10-CM

## 2021-01-27 LAB — FACT X ACT/NOR PPP CHRO: 27 % (ref 70–130)

## 2021-01-27 PROCEDURE — 85260 CLOT FACTOR X STUART-POWER: CPT | Performed by: FAMILY MEDICINE

## 2021-01-27 PROCEDURE — 36415 COLL VENOUS BLD VENIPUNCTURE: CPT | Performed by: FAMILY MEDICINE

## 2021-01-27 NOTE — PROGRESS NOTES
CHROMOGENIC FACTOR 10 MANAGEMENT     Patient Name:  Tone Marley  Date:  1/27/2021    ASSESSMENT /SUBJECTIVE:    CF 10 result 27, is therapeutic. Goal C 10 of 20-40      Warfarin dose taken: Warfarin taken as instructed    Diet: No new diet changes affecting INR    Medication changes/ interactions: No new medications/supplements affecting INR    Previous CF 10: Therapeutic     S/S of bleeding or thromboembolism: No    New injury or illness: No    Upcoming surgery, procedure or cardioversion: No    Additional findings: None      PLAN:    Telephone call with Tone regarding CF 10 result and instructed:     Warfarin Dosing Instructions: Continue your current warfarin dose 1omg Mon/Thu & 12.5mg AOD    Instructed patient to follow up no later than: 6 weeks  Lab visit scheduled    Education provided: Contact [facility] Anticoagulation: [phone]  with any changes, questions or concerns at       Tone verbalizes understanding and agrees to warfarin dosing plan.    Instructed to call the Anticoagulation Clinic for any changes, questions or concerns. (#[phone])        Katherine Larson RN      OBJECTIVE:  Recent labs: (last 7 days)     01/27/21  0813   [id number] 27*           No question data found.  Anticoagulation Summary  As of 1/27/2021    INR goal:  2.0-3.0   TTR:  56.9 % (2.8 mo)   INR used for dosing:     Plan last modified:  Priscila Plasencia [facility] (11/29/2019)   Next INR check:     Target end date:  Indefinite    Indications    Antiphospholipid antibody syndrome (H) [D68.61]  Chronic anticoagulation [Z79.01]             Anticoagulation Episode Summary     INR check location:      Preferred lab:      Send INR reminders to:  LOUIS RIZZO    Comments:  Chromogenic Factor X (20-40%) per Roque Kidd PA-C * transition from Xarelto;  Patient gives permission to leave a detailed message.      Anticoagulation Care Providers     Provider Role Specialty Phone number    Yara Faust MD  [facility] [phone]         Katherine Hardy, RN, BSN, PHN

## 2021-02-05 NOTE — PROGRESS NOTES
Outcome for 02/05/21 10:28 AM :Unable to Leave    Tone Marley  is being evaluated via a billable video visit.      How would you like to obtain your AVS? Laureen  For the video visit, send the invitation by: laureen  Will anyone else be joining your video visit? No  {If patient encounters technical issues they should call [phone] :451    Diabetes & Endocrinology Consult Follow up Note    A/P:  I last saw him on 6/2/2020 and a Dose adjustment for testosterone upwards from 100 mg q 2 wks to 150 mg q 2 wks needed based on labs.     Results, look good    Results for TONE MARLEY (MRN [id number]) as of 2/8/2021 13:35   Ref. Range 8/25/2020 09:07 1/12/2021 08:14   Testosterone Total Latest Ref Range: 240 - 950 ng/dL 472 463     He is due for PSA now.     Patient Instructions:    Nice to talk with you today in virtual clinic.    Please go to any [facility] lab. Your are due for annual PSA. Follow standard safety precautions for COVID-19, practice social isolation, hand hygiene, do not touch your face, nose, or mouth, wear mask if have to go out in public to be respectful of community.  Please contact us to schedule at any of our  [facility] lab locations. Call [phone] ([phone]), select option 1.    RTC: q 6 months    Best Wishes,  Dr. Starla Zuleta MD, MPH  Endocrinologist      Reason for visit/consult: Secondary Hypogonadism     Primary care provider: Yara Faust     HPI:   HPI: Tone Marley is a 30 y.o. male seen for f/u on secondary hypogonadism. labs were drawn and brain imaging and he was started on testosterone injections. He is now taking 150 mg q 14 days. He does not have any complaints today. Had higher energy levels with testosterone replacement.  Always had low libido (more recently within past 1-2 yrs), had low energy, in the past, had hardest time losing weight, putting in 2 hrs at gym 2-4 times weekly, using [employer] was trying to stay under 2000  calories daily, low CHO diet. No kids. Unknown fertility (uses protection) - procreation has not been attempted. In the past, he c/o reduced erection time. Overall, is going better now with more physiologic testosterone levels.  He takes coumadin. 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    Past Medical/Surgical History:  Past Medical History:   Diagnosis Date     ADHD (attention deficit hyperactivity disorder) 6/27/2012     Antiphospholipid antibody syndromes     at age 13     Antiphospholipid syndrome (H) 2/2/2012    On anticoagulation coumdin 10 mg once daily       History of pulmonary embolism 9/2/2013    Was diagnosed  Positive CT scan in emergency department   now Under care of Dr Turcios at [facility],[phone] His nurse personal line [phone] Was on lovenox for 5 months  Now  For past one month on xareltlo 20 mg once daily and plaqunil twice daily       Hyperlipidemia LDL goal <130 6/11/2013     Hypertension goal BP (blood pressure) < 140/90 6/19/12     Hypertension, uncontrolled 6/27/2012     Postphlebitic syndrome 12/9/2014     Prehypertension 2/2/2012     Pulmonary embolism (H)     at age 13     Pulmonary embolism (H) 1/11/2015     Past Surgical History:   Procedure Laterality Date     wisdom teeth extraction         Allergies:  Allergies   Allergen Reactions     Nkda [No Known Drug Allergies]        Current Medications   Current Outpatient Medications   Medication     ASPIRIN PO     atorvastatin (LIPITOR) 10 MG  "tablet     hydrochlorothiazide (HYDRODIURIL) 25 MG tablet     losartan (COZAAR) 100 MG tablet     Omega-3 Fatty Acids (FISH OIL PO)     Syringe/Needle, Disp, 20G X 1-1/2\" 3 ML MISC     testosterone cypionate (DEPOTESTOSTERONE) 100 MG/ML injection     warfarin ANTICOAGULANT (COUMADIN) 10 MG tablet     Blood Collection Needle 20G X 1\" MISC     No current facility-administered medications for this visit.        Family History:  Family History   Problem Relation Age of Onset     Asthma Mother      Heart Surgery Father 60        CABG at 60     C.A.D. Paternal Uncle 42       Social History:  Social History     Tobacco Use     Smoking status: Never Smoker     Smokeless tobacco: Former User     Types: Chew     Tobacco comment: Chew maybe once per week.   Substance Use Topics     Alcohol use: No     Alcohol/week: 0.0 standard drinks     Comment:  quit post stroke 7-2018       ROS:  See HPI    Exam  There were no vitals taken for this virtual visit.  ENDO VITALS-[facility] 9/29/2020   Weight 112.038 kg   Weight 247 lb   Height 72 in   /78   Pulse 64   Resp 16   BSA (Calculated - sq m) 2.38   BMI (Calculated) 33.97   Temp    Temp src    SpO2 95     Gen: well appearing, nad, pleasant and conversant  HEENT: anicteric, EOMI, no proptosis or lid lag    Labs/Imaging    See HPI  Results for ABILIO MAYES J (MRN [id number]) as of 2/9/2021 15:27   Ref. Range 10/30/2019 07:40   PSA Latest Ref Range: 0 - 4 ug/L 0.59     Results for COOPERWESLEY DONALD (MRN [id number]) as of 2/9/2021 15:27   Ref. Range 2/10/2020 08:31   Hemoglobin Latest Ref Range: 13.3 - 17.7 g/dL 14.3     Results for SUGEY DONALD (MRN [id number]) as of 2/9/2021 15:27   Ref. Range 8/25/2020 09:07 1/12/2021 08:14   Free Testosterone Calculated Latest Ref Range: 4.7 - 24.4 ng/dL 15.06 13.78     Results for COOPERWESLEY DONALD (MRN [id number]) as of 2/9/2021 15:27   Ref. Range 8/25/2020 09:07 1/12/2021 08:14   Testosterone Total Latest Ref Range: 240 - 950 ng/dL 472 " 463       Video:  Start: 02/08/2021 01:58 pm   Stop: 02/08/2021 02:06 pm    Total Time: 30 min spent on chart review including outside records, evaluation, management, counseling, education, & motivational interviewing with greater than 50% of the total time was spent on counseling and coordinating care and documentation

## 2021-02-08 ENCOUNTER — VIRTUAL VISIT (OUTPATIENT)
Dept: ENDOCRINOLOGY | Facility: CLINIC | Age: 31
End: 2021-02-08
Payer: COMMERCIAL

## 2021-02-08 DIAGNOSIS — E29.1 HYPOGONADISM MALE: ICD-10-CM

## 2021-02-08 DIAGNOSIS — Z86.711 HISTORY OF PULMONARY EMBOLISM: ICD-10-CM

## 2021-02-08 DIAGNOSIS — Z86.73 HISTORY OF CVA (CEREBROVASCULAR ACCIDENT): ICD-10-CM

## 2021-02-08 DIAGNOSIS — D68.61 ANTIPHOSPHOLIPID ANTIBODY SYNDROME (H): ICD-10-CM

## 2021-02-08 DIAGNOSIS — Z79.01 CHRONIC ANTICOAGULATION: ICD-10-CM

## 2021-02-08 DIAGNOSIS — R79.89 LOW SERUM TESTOSTERONE: Primary | ICD-10-CM

## 2021-02-08 PROCEDURE — 99214 OFFICE O/P EST MOD 30 MIN: CPT | Mod: GT | Performed by: INTERNAL MEDICINE

## 2021-02-08 RX ORDER — TESTOSTERONE CYPIONATE 1000 MG/10ML
150 INJECTION, SOLUTION INTRAMUSCULAR
Qty: 9 ML | Refills: 1 | Status: SHIPPED | OUTPATIENT
Start: 2021-02-08 | End: 2021-08-13

## 2021-02-08 NOTE — LETTER
2/8/2021       RE: Abilio Marley  [address] [facility] [address]     Dear Colleague,    Thank you for referring your patient, Abilio Marley, to the [facility] [place] at [facility]. Please see a copy of my visit note below.    Outcome for 02/05/21 10:28 AM :Unable to Leave VM   Abilio Marley  is being evaluated via a billable video visit.      How would you like to obtain your AVS? [employer]  For the video visit, send the invitation by: [employer]  Will anyone else be joining your video visit? No  {If patient encounters technical issues they should call [phone] :[phone]    Diabetes & Endocrinology Consult Follow up Note    A/P:  I last saw him on 6/2/2020 and a Dose adjustment for testosterone upwards from 100 mg q 2 wks to 150 mg q 2 wks needed based on labs.     Results, look good    Results for ABILIO MARLEY (MRN [id number]) as of 2/8/2021 13:35   Ref. Range 8/25/2020 09:07 1/12/2021 08:14   Testosterone Total Latest Ref Range: 240 - 950 ng/dL 472 463     He is due for PSA now.     Patient Instructions:    Nice to talk with you today in virtual clinic.    Please go to any [place] lab. Your are due for annual PSA. Follow standard safety precautions for COVID-19, practice social isolation, hand hygiene, do not touch your face, nose, or mouth, wear mask if have to go out in public to be respectful of community.  Please contact us to schedule at any of our [facility] lab locations. Call [phone] ([phone]), select option [phone].    RTC: q 6 months    Best Wishes,  Dr. Starla Zuleta MD, MPH  Endocrinologist      Reason for visit/consult: Secondary Hypogonadism     Primary care provider: Yara Faust     HPI:   HPI: Abilio Marley is a 30 y.o. male seen for f/u on secondary hypogonadism. labs were drawn and brain imaging and he was started on testosterone injections. He is now taking 150 mg  q 14 days. He does not have any complaints today. Had higher energy levels with testosterone replacement.  Always had low libido (more recently within past 1-2 yrs), had low energy, in the past, had hardest time losing weight, putting in 2 hrs at gym 2-4 times weekly, using [employer] was trying to stay under 2000 calories daily, low CHO diet. No kids. Unknown fertility (uses protection) - procreation has not been attempted. In the past, he c/o reduced erection time. Overall, is going better now with more physiologic testosterone levels.  He takes coumadin. 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    Past Medical/Surgical History:  Past Medical History:   Diagnosis Date     ADHD (attention deficit hyperactivity disorder) 6/27/2012     Antiphospholipid antibody syndromes     at age 13     Antiphospholipid syndrome (H) 2/2/2012    On anticoagulation coumdin 10 mg once daily       History of pulmonary embolism 9/2/2013    Was diagnosed  Positive CT scan in emergency department   now Under care of Dr Turcios at [facility],[phone] His nurse personal line [phone] Was on lovenox for 5 months  Now  For past one month on xareltlo 20 mg once daily and plaqunil twice daily       Hyperlipidemia LDL goal <130 6/11/2013     Hypertension goal BP (blood pressure) < 140/90 6/19/12     Hypertension, uncontrolled 6/27/2012     Postphlebitic syndrome 12/9/2014     Prehypertension 2/2/2012     Pulmonary embolism (H)     at age 13      "Pulmonary embolism (H) 1/11/2015     Past Surgical History:   Procedure Laterality Date     wisdom teeth extraction         Allergies:  Allergies   Allergen Reactions     Nkda [No Known Drug Allergies]        Current Medications   Current Outpatient Medications   Medication     ASPIRIN PO     atorvastatin (LIPITOR) 10 MG tablet     hydrochlorothiazide (HYDRODIURIL) 25 MG tablet     losartan (COZAAR) 100 MG tablet     Omega-3 Fatty Acids (FISH OIL PO)     Syringe/Needle, Disp, 20G X 1-1/2\" 3 ML MISC     testosterone cypionate (DEPOTESTOSTERONE) 100 MG/ML injection     warfarin ANTICOAGULANT (COUMADIN) 10 MG tablet     Blood Collection Needle 20G X 1\" MISC     No current facility-administered medications for this visit.        Family History:  Family History   Problem Relation Age of Onset     Asthma Mother      Heart Surgery Father 60        CABG at 60     C.A.D. Paternal Uncle 42       Social History:  Social History     Tobacco Use     Smoking status: Never Smoker     Smokeless tobacco: Former User     Types: Chew     Tobacco comment: Chew maybe once per week.   Substance Use Topics     Alcohol use: No     Alcohol/week: 0.0 standard drinks     Comment:  quit post stroke 7-2018       ROS:  See HPI    Exam  There were no vitals taken for this virtual visit.  ENDO VITALS-UMP 9/29/2020   Weight 112.038 kg   Weight 247 lb   Height 72 in   /78   Pulse 64   Resp 16   BSA (Calculated - sq m) 2.38   BMI (Calculated) 33.97   Temp    Temp src    SpO2 95     Gen: well appearing, nad, pleasant and conversant  HEENT: anicteric, EOMI, no proptosis or lid lag    Labs/Imaging    See HPI  Results for ABILIO MAYES (MRN [id number]) as of 2/9/2021 15:27   Ref. Range 10/30/2019 07:40   PSA Latest Ref Range: 0 - 4 ug/L 0.59     Results for ABILIO MAYES (MRN [id number]) as of 2/9/2021 15:27   Ref. Range 2/10/2020 08:31   Hemoglobin Latest Ref Range: 13.3 - 17.7 g/dL 14.3     Results for ABILIO MAYES (MRN " [id number]) as of 2/9/2021 15:27   Ref. Range 8/25/2020 09:07 1/12/2021 08:14   Free Testosterone Calculated Latest Ref Range: 4.7 - 24.4 ng/dL 15.06 13.78     Results for COOPERGREYJEAN CLAUDE DONALD (MRN [id number]) as of 2/9/2021 15:27   Ref. Range 8/25/2020 09:07 1/12/2021 08:14   Testosterone Total Latest Ref Range: 240 - 950 ng/dL 472 303       Video:  Start: 02/08/2021 01:58 pm   Stop: 02/08/2021 02:06 pm    Total Time: 30 min spent on chart review including outside records, evaluation, management, counseling, education, & motivational interviewing with greater than 50% of the total time was spent on counseling and coordinating care and documentation

## 2021-02-08 NOTE — PATIENT INSTRUCTIONS
Nice to talk with you today in virtual clinic.    Please go to any [facility] lab. Your are due for annual PSA. Follow standard safety precautions for COVID-19, practice social isolation, hand hygiene, do not touch your face, nose, or mouth, wear mask if have to go out in public to be respectful of community.  Please contact us to schedule at any of our  [facility] [facility] lab locations. Call [phone]), select option [phone].    RTC: q 6 months    Best Wishes,  Dr. Starla Zuleta MD, MPH  Endocrinologist

## 2021-03-09 ENCOUNTER — ANTICOAGULATION THERAPY VISIT (OUTPATIENT)
Dept: FAMILY MEDICINE | Facility: CLINIC | Age: 31
End: 2021-03-09

## 2021-03-09 DIAGNOSIS — Z86.711 HISTORY OF PULMONARY EMBOLISM: ICD-10-CM

## 2021-03-09 DIAGNOSIS — Z79.01 CHRONIC ANTICOAGULATION: ICD-10-CM

## 2021-03-09 DIAGNOSIS — D68.61 ANTIPHOSPHOLIPID ANTIBODY SYNDROME (H): ICD-10-CM

## 2021-03-09 LAB — FACT X ACT/NOR PPP CHRO: 35 % (ref 70–130)

## 2021-03-09 PROCEDURE — 85260 CLOT FACTOR X STUART-POWER: CPT | Performed by: FAMILY MEDICINE

## 2021-03-09 PROCEDURE — 36415 COLL VENOUS BLD VENIPUNCTURE: CPT | Performed by: FAMILY MEDICINE

## 2021-03-09 NOTE — PROGRESS NOTES
Anticoagulation Monitoring Factor X   Latest Ref Rng & Units 70 - 130 %   3/9/2021 35 (L)   1/27/2021 27 (L)   1/27/2021    12/15/2020 26 (L)   12/15/2020    11/17/2020 32 (L)   11/17/2020      ANTICOAGULATION MANAGEMENT     Patient Name:  Tone Marley  Date:  3/9/2021    ASSESSMENT /SUBJECTIVE:    Today's CFX result of 35 % is therapeutic. Goal INR of 20-40%      Warfarin dose taken: Warfarin taken as instructed    Diet: No new diet changes affecting INR    Medication changes/ interactions: No new medications/supplements affecting INR    Previous INR: Therapeutic     S/S of bleeding or thromboembolism: No    New injury or illness: No    Upcoming surgery, procedure or cardioversion: No    Additional findings: None      PLAN:    Telephone call with Tone regarding INR result and instructed:     Warfarin Dosing Instructions: Continue your current warfarin dose 10 mg MTH; 12.5 mg ROW    Instructed patient to follow up no later than: 6 weeks  Lab visit scheduled    Education provided: None required      Tone verbalizes understanding and agrees to warfarin dosing plan.    Instructed to call the Anticoagulation Clinic for any changes, questions or concerns. (#[phone])        Elizabeth Head RN      OBJECTIVE:  Recent labs: (last 7 days)     03/09/21  0817   [id number] 35*         No question data found.  Anticoagulation Summary  As of 3/9/2021    INR goal:  2.0-3.0   TTR:  26.6 % (1.4 mo)   INR used for dosing:     Warfarin maintenance plan:  10 mg (5 mg x 2) every Mon, Thu; 12.5 mg (5 mg x 2.5) all other days   Full warfarin instructions:  10 mg every Mon, Thu; 12.5 mg all other days   Weekly warfarin total:  82.5 mg   Plan last modified:  Priscila Plasencia, [facility] (11/29/2019)   Next INR check:     Priority:  Maintenance   Target end date:  Indefinite    Indications    Antiphospholipid antibody syndrome (H) [D68.61]  Chronic anticoagulation [Z79.01]             Anticoagulation Episode Summary     INR check  location:      Preferred lab:      Send INR reminders to:  LOUIS RIZZO    Comments:  Chromogenic Factor X (20-40%) per Roque Kidd PA-C * transition from Xarelto;  Patient gives permission to leave a detailed message.      Anticoagulation Care Providers     Provider Role Specialty Phone number    Yara Faust MD [facility] [phone]

## 2021-03-29 ENCOUNTER — TELEPHONE (OUTPATIENT)
Dept: FAMILY MEDICINE | Facility: CLINIC | Age: 31
End: 2021-03-29

## 2021-03-29 ENCOUNTER — NURSE TRIAGE (OUTPATIENT)
Dept: NURSING | Facility: CLINIC | Age: 31
End: 2021-03-29

## 2021-03-29 DIAGNOSIS — Z79.01 CHRONIC ANTICOAGULATION: ICD-10-CM

## 2021-03-29 DIAGNOSIS — D68.61 ANTIPHOSPHOLIPID ANTIBODY SYNDROME (H): ICD-10-CM

## 2021-03-29 DIAGNOSIS — Z86.711 HISTORY OF PULMONARY EMBOLISM: ICD-10-CM

## 2021-03-29 DIAGNOSIS — Z86.73 HISTORY OF CVA (CEREBROVASCULAR ACCIDENT): ICD-10-CM

## 2021-03-29 RX ORDER — WARFARIN SODIUM 10 MG/1
TABLET ORAL
Qty: 90 TABLET | Refills: 0 | Status: SHIPPED | OUTPATIENT
Start: 2021-03-29 | End: 2021-07-16

## 2021-03-29 RX ORDER — WARFARIN SODIUM 5 MG/1
5 TABLET ORAL DAILY
Qty: 150 TABLET | Refills: 0 | Status: SHIPPED | OUTPATIENT
Start: 2021-03-29 | End: 2021-07-16

## 2021-03-29 NOTE — TELEPHONE ENCOUNTER
Patient calling for his Warfarin prescription refill.  He takes 12.5mg most days, 10mg others.      Patient says he needs a 5mg prescription in addition to a 10mg prescription.     Routed to [facility] triage    Angelica Woods RN  [facility]      Reason for Disposition    Request for URGENT new prescription or refill of 'essential' medication (i.e., likelihood of harm to patient if not taken) and triager unable to fill per department policy    Protocols used: MEDICATION QUESTION CALL-A-[place]

## 2021-03-29 NOTE — TELEPHONE ENCOUNTER
Patient calling for his Warfarin prescription refill.  He takes 12.5mg most days, 10mg others.      Patient says he needs a 5mg prescription in addition to a 10mg prescription.  Using the [facility]'s pharmacy on record.    Routed to [facility] triage    Angelica Woods RN  [facility]

## 2021-04-10 NOTE — TELEPHONE ENCOUNTER
Anticoagulation Management    Patient INR today is 2.45  Factor X is still in process.    Will address when Factor X result is completed.    Anticoagulation clinic to follow up    Tana Iqbal RN   Current some day smoker

## 2021-04-29 ENCOUNTER — DOCUMENTATION ONLY (OUTPATIENT)
Dept: FAMILY MEDICINE | Facility: CLINIC | Age: 31
End: 2021-04-29

## 2021-05-06 ENCOUNTER — ANTICOAGULATION THERAPY VISIT (OUTPATIENT)
Dept: FAMILY MEDICINE | Facility: CLINIC | Age: 31
End: 2021-05-06

## 2021-05-06 DIAGNOSIS — D68.61 ANTIPHOSPHOLIPID ANTIBODY SYNDROME (H): ICD-10-CM

## 2021-05-06 DIAGNOSIS — Z79.01 CHRONIC ANTICOAGULATION: ICD-10-CM

## 2021-05-06 DIAGNOSIS — Z86.711 HISTORY OF PULMONARY EMBOLISM: ICD-10-CM

## 2021-05-06 LAB — FACT X ACT/NOR PPP CHRO: 36 % (ref 70–130)

## 2021-05-06 PROCEDURE — 36415 COLL VENOUS BLD VENIPUNCTURE: CPT | Performed by: FAMILY MEDICINE

## 2021-05-06 PROCEDURE — 85260 CLOT FACTOR X STUART-POWER: CPT | Performed by: FAMILY MEDICINE

## 2021-05-06 NOTE — PROGRESS NOTES
Anticoagulation Monitoring Factor X   Latest Ref Rng & Units 70 - 130 %   5/6/2021 36 (L)   3/9/2021 35 (L)   3/9/2021    1/27/2021 27 (L)   1/27/2021    12/15/2020 26 (L)   12/15/2020    11/17/2020 32 (L)   11/17/2020    10/20/2020 30 (L)   10/20/2020    9/22/2020 32 (L)     ANTICOAGULATION MANAGEMENT     Patient Name:  Tone Marley  Date:  5/6/2021    ASSESSMENT /SUBJECTIVE:    Today's CF10 result of 36% and is therapeutic. Goal INR of 2.0-3.0 = CF10 range 20-40%      Warfarin dose taken: Warfarin taken as instructed    Diet: No new diet changes affecting INR    Medication changes/ interactions: No new medications/supplements affecting INR    Previous CF10: Therapeutic     S/S of bleeding or thromboembolism: No    New injury or illness: No    Upcoming surgery, procedure or cardioversion: No    Additional findings: None      PLAN:    Telephone call with Tone regarding INR result and instructed:     Warfarin Dosing Instructions: Continue your current warfarin dose 10 mg (5 mg x 2) every Mon, Thu; 12.5 mg (5 mg x 2.5) all other days    Instructed patient to follow up no later than: 6 weeks  Lab visit scheduled    Education provided: Please call back if any changes to your diet, medications or how you've been taking warfarin      Tone verbalizes understanding and agrees to warfarin dosing plan.    Instructed to call the Anticoagulation Clinic for any changes, questions or concerns. (#[phone])        Fiordaliza Hatch RN      OBJECTIVE:  Recent labs: (last 7 days)     05/06/21  0846   [id number] 36*         No question data found.  Anticoagulation Summary  As of 5/6/2021    INR goal:  2.0-3.0   TTR:  --   INR used for dosing:  No new INR was available at the time of this encounter.   Warfarin maintenance plan:  10 mg (5 mg x 2) every Mon, Thu; 12.5 mg (5 mg x 2.5) all other days   Full warfarin instructions:  10 mg every Mon, Thu; 12.5 mg all other days   Weekly warfarin total:  82.5 mg   No change  documented:  Fiordaliza Hatch RN   Plan last modified:  Priscila Plasencia, [facility] (11/29/2019)   Next INR check:  6/17/2021   Priority:  Maintenance   Target end date:  Indefinite    Indications    Antiphospholipid antibody syndrome (H) [D68.61]  Chronic anticoagulation [Z79.01]             Anticoagulation Episode Summary     INR check location:      Preferred lab:      Send INR reminders to:  LOUIS RIZZO    Comments:  Chromogenic Factor X (20-40%) per Roque Kidd PA-C * transition from Xarelto;  Patient gives permission to leave a detailed message.      Anticoagulation Care Providers     Provider Role Specialty Phone number    Yara Faust MD Referring Family Medicine [phone]

## 2021-06-15 ENCOUNTER — ANTICOAGULATION THERAPY VISIT (OUTPATIENT)
Dept: FAMILY MEDICINE | Facility: CLINIC | Age: 31
End: 2021-06-15

## 2021-06-15 DIAGNOSIS — D68.61 ANTIPHOSPHOLIPID ANTIBODY SYNDROME (H): ICD-10-CM

## 2021-06-15 DIAGNOSIS — Z86.711 HISTORY OF PULMONARY EMBOLISM: ICD-10-CM

## 2021-06-15 DIAGNOSIS — Z79.01 CHRONIC ANTICOAGULATION: ICD-10-CM

## 2021-06-15 LAB — FACT X ACT/NOR PPP CHRO: 31 % (ref 70–130)

## 2021-06-15 PROCEDURE — 85260 CLOT FACTOR X STUART-POWER: CPT | Performed by: INTERNAL MEDICINE

## 2021-06-15 PROCEDURE — 36415 COLL VENOUS BLD VENIPUNCTURE: CPT | Performed by: INTERNAL MEDICINE

## 2021-06-15 NOTE — PROGRESS NOTES
ANTICOAGULATION MANAGEMENT     Patient Name:  Tone Marley  Date:  6/15/2021    ASSESSMENT /SUBJECTIVE:    Today's CFX result of 31% is therapeutic. Goal CFX of 40-20%    Anticoagulation Monitoring Factor X   Latest Ref Rng & Units 70 - 130 %   6/15/2021 31 (L)   5/6/2021 36 (L)   5/6/2021    3/9/2021 35 (L)   3/9/2021    1/27/2021 27 (L)       Warfarin dose taken: Warfarin taken as instructed    Diet: No new diet changes affecting INR    Medication changes/ interactions: No new medications/supplements affecting INR    Previous CFX: Therapeutic     S/S of bleeding or thromboembolism: No    New injury or illness: No    Upcoming surgery, procedure or cardioversion: No    Additional findings: None      PLAN:    Warfarin Dosing Instructions: Continue your current warfarin dose 10mg jessica Mon, Thu; 12.5mg all other days    Instructed patient to follow up no later than: 6 weeks  Lab visit scheduled    Education provided: Target CFX and significance of today's result    Telephone call with Tone whom verbalizes understanding and agrees to plan    Instructed to call the Anticoagulation Clinic for any changes, questions or concerns. (#[phone])        Lamin Brito RN      OBJECTIVE:  Recent labs: (last 7 days)     06/15/21  0820   [id number] 31*         No question data found.  Anticoagulation Summary  As of 6/15/2021    INR goal:  2.0-3.0   TTR:  --   INR used for dosing:  No new INR was available at the time of this encounter.   Warfarin maintenance plan:  10 mg (5 mg x 2) every Mon, Thu; 12.5 mg (5 mg x 2.5) all other days   Full warfarin instructions:  10 mg every Mon, Thu; 12.5 mg all other days   Weekly warfarin total:  82.5 mg   No change documented:  Lamin Brito RN   Plan last modified:  Priscila Plasencia, [facility] (11/29/2019)   Next INR check:  7/27/2021   Priority:  Maintenance   Target end date:  Indefinite    Indications    Antiphospholipid antibody syndrome (H) [D68.61]  Chronic anticoagulation  [Z79.01]             Anticoagulation Episode Summary     INR check location:      Preferred lab:      Send INR reminders to:  LOUIS RIZZO    Comments:  Chromogenic Factor X (20-40%) per Roque Kidd PA-C * transition from Xarelto;  Patient gives permission to leave a detailed message.      Anticoagulation Care Providers     Provider Role Specialty Phone number    Yara Faust MD Referring Family Medicine [phone]

## 2021-07-16 DIAGNOSIS — Z86.73 HISTORY OF CVA (CEREBROVASCULAR ACCIDENT): ICD-10-CM

## 2021-07-16 DIAGNOSIS — D68.61 ANTIPHOSPHOLIPID ANTIBODY SYNDROME (H): ICD-10-CM

## 2021-07-16 DIAGNOSIS — Z86.711 HISTORY OF PULMONARY EMBOLISM: ICD-10-CM

## 2021-07-16 DIAGNOSIS — Z79.01 CHRONIC ANTICOAGULATION: ICD-10-CM

## 2021-07-16 RX ORDER — WARFARIN SODIUM 10 MG/1
TABLET ORAL
Qty: 90 TABLET | Refills: 1 | Status: SHIPPED | OUTPATIENT
Start: 2021-07-16 | End: 2022-03-16

## 2021-07-16 RX ORDER — WARFARIN SODIUM 5 MG/1
TABLET ORAL
Qty: 150 TABLET | Refills: 1 | Status: SHIPPED | OUTPATIENT
Start: 2021-07-16 | End: 2022-07-19

## 2021-07-16 NOTE — TELEPHONE ENCOUNTER
Anticoagulation Monitoring Return Date Recheck   Latest Ref Rng & Units     6/15/2021 7/27/2021      Anticoagulation Monitoring Instructions   Latest Ref Rng & Units    6/15/2021 10 mg every Mon, Thu; 12.5 mg all other days   Prescription approved per [facility] Refill Protocol.  Fiordaliza Hatch, RN  Anticoagulation Nurse - [facility]

## 2021-07-27 ENCOUNTER — LAB (OUTPATIENT)
Dept: LAB | Facility: CLINIC | Age: 31
End: 2021-07-27
Payer: COMMERCIAL

## 2021-07-27 DIAGNOSIS — D68.61 ANTIPHOSPHOLIPID ANTIBODY SYNDROME (H): ICD-10-CM

## 2021-07-27 DIAGNOSIS — Z86.73 HISTORY OF CVA (CEREBROVASCULAR ACCIDENT): ICD-10-CM

## 2021-07-27 DIAGNOSIS — R79.89 LOW SERUM TESTOSTERONE: ICD-10-CM

## 2021-07-27 DIAGNOSIS — Z79.01 CHRONIC ANTICOAGULATION: ICD-10-CM

## 2021-07-27 DIAGNOSIS — E29.1 HYPOGONADISM MALE: ICD-10-CM

## 2021-07-27 DIAGNOSIS — Z86.711 HISTORY OF PULMONARY EMBOLISM: ICD-10-CM

## 2021-07-27 LAB
HGB BLD-MCNC: 14.9 G/DL (ref 13.3–17.7)
PSA SERPL-MCNC: 0.92 UG/L (ref 0–4)

## 2021-07-27 PROCEDURE — 84153 ASSAY OF PSA TOTAL: CPT

## 2021-07-27 PROCEDURE — 85018 HEMOGLOBIN: CPT

## 2021-07-27 PROCEDURE — 85260 CLOT FACTOR X STUART-POWER: CPT

## 2021-07-27 PROCEDURE — 36415 COLL VENOUS BLD VENIPUNCTURE: CPT

## 2021-07-27 PROCEDURE — 84270 ASSAY OF SEX HORMONE GLOBUL: CPT

## 2021-07-27 PROCEDURE — 84403 ASSAY OF TOTAL TESTOSTERONE: CPT

## 2021-07-28 ENCOUNTER — ANTICOAGULATION THERAPY VISIT (OUTPATIENT)
Dept: FAMILY MEDICINE | Facility: CLINIC | Age: 31
End: 2021-07-28

## 2021-07-28 DIAGNOSIS — D68.61 ANTIPHOSPHOLIPID ANTIBODY SYNDROME (H): Primary | ICD-10-CM

## 2021-07-28 DIAGNOSIS — Z79.01 CHRONIC ANTICOAGULATION: ICD-10-CM

## 2021-07-28 LAB — FACT X ACT/NOR PPP CHRO: 34 % (ref 70–130)

## 2021-07-28 NOTE — PROGRESS NOTES
Anticoagulation Monitoring Factor X Factor X   Latest Ref Rng & Units 70 - 130 % 70 - 130 %   7/27/2021  34 (L)   6/15/2021 31 (L)    6/15/2021     5/6/2021 36 (L)    5/6/2021     3/9/2021 35 (L)    3/9/2021     1/27/2021 27 (L)    1/27/2021     12/15/2020 26 (L)    ASSESSMENT /SUBJECTIVE:    Today's Factor X result of 34% is therapeutic. Goal Factor X of 40-20%      Warfarin dose taken: Warfarin taken as instructed    Diet: No new diet changes identified    Medication changes/ interactions: No new medications/supplements affecting INR    Previous INR: Therapeutic     S/S of bleeding or thromboembolism: No    New injury or illness: No    Upcoming surgery, procedure or cardioversion: No    Additional findings: None      PLAN:    Telephone call with Tone who verbalizes understanding and agrees to plan regarding INR result and instructed:     Warfarin Dosing Instructions: Continue your current warfarin dose    Instructed patient to follow up no later than: 6 weeks  Lab visit scheduled    Education provided: Please call back if any changes to your diet, medications or how you've been taking warfarin      Tone verbalizes understanding and agrees to warfarin dosing plan.    Instructed to call the Anticoagulation Clinic for any changes, questions or concerns. (#[phone])        Tana Iqbal RN

## 2021-07-29 LAB
SHBG SERPL-SCNC: 16 NMOL/L (ref 11–80)
TESTOST FREE SERPL-MCNC: 11.09 NG/DL
TESTOST SERPL-MCNC: 388 NG/DL (ref 240–950)

## 2021-08-13 ENCOUNTER — TELEPHONE (OUTPATIENT)
Dept: ENDOCRINOLOGY | Facility: CLINIC | Age: 31
End: 2021-08-13

## 2021-08-13 DIAGNOSIS — Z79.01 CHRONIC ANTICOAGULATION: ICD-10-CM

## 2021-08-13 DIAGNOSIS — D68.61 ANTIPHOSPHOLIPID ANTIBODY SYNDROME (H): ICD-10-CM

## 2021-08-13 DIAGNOSIS — R79.89 LOW SERUM TESTOSTERONE: Primary | ICD-10-CM

## 2021-08-13 DIAGNOSIS — Z86.73 HISTORY OF CVA (CEREBROVASCULAR ACCIDENT): ICD-10-CM

## 2021-08-13 DIAGNOSIS — Z86.711 HISTORY OF PULMONARY EMBOLISM: ICD-10-CM

## 2021-08-13 DIAGNOSIS — R79.89 LOW SERUM TESTOSTERONE: ICD-10-CM

## 2021-08-13 DIAGNOSIS — E29.1 HYPOGONADISM MALE: ICD-10-CM

## 2021-08-13 RX ORDER — NEEDLES, DISPOSABLE 25GX5/8"
NEEDLE, DISPOSABLE MISCELLANEOUS
OUTPATIENT
Start: 2021-08-13

## 2021-08-13 RX ORDER — TESTOSTERONE CYPIONATE 1000 MG/10ML
150 INJECTION, SOLUTION INTRAMUSCULAR
Qty: 10 ML | Refills: 1 | Status: SHIPPED | OUTPATIENT
Start: 2021-08-13 | End: 2022-02-16

## 2021-08-13 NOTE — TELEPHONE ENCOUNTER
Call to [facility] pharmacy, they accidentally deleted the last refill on both needle and needle syringe prescriptions.  Provided verbal order to refill remaining refill from 11/13/20 prescription.    Call to Tone, message left of refill of each item available at pharmacy. Advised of need to schedule follow up appointment for October, included clinic number to call

## 2021-08-13 NOTE — TELEPHONE ENCOUNTER
[facility] Call Center    Phone Message    May a detailed message be left on voicemail: yes     Reason for Call: Medication Refill Request    Has the patient contacted the pharmacy for the refill? Yes   Name of medication being requested: testosterone cypionate (DEPOTESTOSTERONE) 100 MG/ML injection  Provider who prescribed the medication: Merlyn  Pharmacy: [id number] - [place], [place] - [address] AT [facility] [address] [place]  Date medication is needed: Pt stated he is completely out of medication- scheduled next available for 8/30/21.        Action Taken: Message routed to:  [facility]o    Travel Screening: Not Applicable

## 2021-08-13 NOTE — TELEPHONE ENCOUNTER
"[facility] Call Center    Phone Message    May a detailed message be left on voicemail: yes     Reason for Call: Medication Refill Request    Has the patient contacted the pharmacy for the refill? Yes   Name of medication being requested: Blood Collection Needle 20G X 1\" MISC  And  Syringe/Needle, Disp, 20G X 1-1/2\" 3 ML MISC  Provider who prescribed the medication: [facility]  Pharmacy: [id number] - [place], [place] - [address] AT [facility] [address]  Date medication is needed: asap, Pt is wanting to know if provider is also going to refill these with the testosterone as well?         Action Taken: Message routed to:  [facility] endo    Travel Screening: Not Applicable                                                                        "

## 2021-08-13 NOTE — TELEPHONE ENCOUNTER
TESTOSTERONE CYP 100MG/ML MDV 10ML      Last Written Prescription Date:  2/8/21  Last Fill Quantity: 9 ml,   # refills: 1  Last Office Visit : 2/8/21 recommended 6 month follow up  Future Office visit:  None scheduled    Routing refill request to provider for review/approval because:  Drug controlled substance

## 2021-08-13 NOTE — TELEPHONE ENCOUNTER
Call from Tone, reports he is out of med, is due to take tomorrow, advised will sarah high priority as is controlled med, provider needs to authorize.

## 2021-08-13 NOTE — TELEPHONE ENCOUNTER
Call to Tone, advised refill had been sent earlier today to provider, will sarah high priority and resend as reports is out of med and due tomorrow

## 2021-08-17 RX ORDER — SYRINGE WITH NEEDLE, 1 ML 25GX5/8"
SYRINGE, EMPTY DISPOSABLE MISCELLANEOUS
Qty: 1 EACH | Refills: 0 | OUTPATIENT
Start: 2021-08-17

## 2021-08-17 RX ORDER — NEEDLES, DISPOSABLE 25GX5/8"
NEEDLE, DISPOSABLE MISCELLANEOUS
Qty: 6 EACH | Refills: 1 | Status: SHIPPED | OUTPATIENT
Start: 2021-08-17 | End: 2022-06-09

## 2021-08-17 NOTE — TELEPHONE ENCOUNTER
"Blood Collection Needle 20G X 1\" MISC   Us to inject testosterone IM     Last Written Prescription Date:  11/13/20  Last Fill Quantity: 6 ea,   # refills: 3  Last Office Visit : 2/8/21  Future Office visit:  8/30/21    Syringe/Needle, Disp, 20G X 1-1/2\" 3 ML MISC    Use to draw up testosterone for IM injection    Last Written Prescription Date:  11/13/20  Last Fill Quantity: 6 ea,   # refills: 3    Routing refill request to provider for review/approval because:  Deferred to Clinic RN.          "

## 2021-09-07 ENCOUNTER — LAB (OUTPATIENT)
Dept: LAB | Facility: CLINIC | Age: 31
End: 2021-09-07
Payer: COMMERCIAL

## 2021-09-07 ENCOUNTER — ANTICOAGULATION THERAPY VISIT (OUTPATIENT)
Dept: FAMILY MEDICINE | Facility: CLINIC | Age: 31
End: 2021-09-07

## 2021-09-07 DIAGNOSIS — D68.61 ANTIPHOSPHOLIPID ANTIBODY SYNDROME (H): ICD-10-CM

## 2021-09-07 DIAGNOSIS — Z86.73 HISTORY OF CVA (CEREBROVASCULAR ACCIDENT): ICD-10-CM

## 2021-09-07 DIAGNOSIS — Z79.01 CHRONIC ANTICOAGULATION: ICD-10-CM

## 2021-09-07 DIAGNOSIS — D68.61 ANTIPHOSPHOLIPID ANTIBODY SYNDROME (H): Primary | ICD-10-CM

## 2021-09-07 LAB — FACT X ACT/NOR PPP CHRO: 35 % (ref 70–130)

## 2021-09-07 PROCEDURE — 36415 COLL VENOUS BLD VENIPUNCTURE: CPT

## 2021-09-07 PROCEDURE — 85260 CLOT FACTOR X STUART-POWER: CPT

## 2021-09-07 NOTE — PROGRESS NOTES
ANTICOAGULATION MANAGEMENT     Tone Marley 31 year old male is on warfarin with therapeutic result. (Goal Chromogenic Factor X 40-20%)        Anticoagulation Monitoring CHROMOGENIC FACTOR 10   Latest Ref Rng & Units 70 - 130 %   9/7/2021 35 (L)   7/28/2021 7/27/2021 34 (L)   6/15/2021 31 (L)   6/15/2021    5/6/2021 36 (L)   5/6/2021    3/9/2021 35 (L)     ASSESSMENT     Source(s): Patient/Caregiver Call       Warfarin doses taken: Warfarin taken as instructed    Diet: No new diet changes identified    New illness, injury, or hospitalization: No    Medication/supplement changes: None noted    Signs or symptoms of bleeding or clotting: No    Previous result: Therapeutic last 2(+) visits    Additional findings: None     PLAN     Recommended plan for no diet, medication or health factor changes affecting result    Dosing Instructions: Continue your current warfarin dose 10mg every Mon, Thu; 12.5mg all other days with next Chromogenic Factor X in 6 weeks       Telephone call with Tone who verbalizes understanding and agrees to plan    Lab visit scheduled    Education provided: Goal range and significance of current result    Plan made per [facility] anticoagulation protocol    Lamin Brito RN  Anticoagulation Clinic  9/7/2021    _______________________________________________________________________     Anticoagulation Episode Summary     Current INR goal:  2.0-3.0   TTR:  --   Target end date:  Indefinite   Send INR reminders to:  [facility]    Indications    Antiphospholipid antibody syndrome (H) [D68.61]  Chronic anticoagulation [Z79.01]           Comments:  Chromogenic Factor X (20-40%) per Roque Kidd PA-C * transition from Xarelto;  Patient gives permission to leave a detailed message.         Anticoagulation Care Providers     Provider Role Specialty Phone number    Yara Faust MD Referring Family Medicine [phone]

## 2021-09-13 ENCOUNTER — MYC MEDICAL ADVICE (OUTPATIENT)
Dept: FAMILY MEDICINE | Facility: CLINIC | Age: 31
End: 2021-09-13

## 2021-09-13 DIAGNOSIS — Z20.828 EXPOSURE TO HERPES SIMPLEX VIRUS (HSV): Primary | ICD-10-CM

## 2021-09-15 ENCOUNTER — LAB (OUTPATIENT)
Dept: LAB | Facility: CLINIC | Age: 31
End: 2021-09-15
Payer: COMMERCIAL

## 2021-09-15 DIAGNOSIS — Z20.828 EXPOSURE TO HERPES SIMPLEX VIRUS (HSV): ICD-10-CM

## 2021-09-15 PROCEDURE — 86696 HERPES SIMPLEX TYPE 2 TEST: CPT

## 2021-09-15 PROCEDURE — 36415 COLL VENOUS BLD VENIPUNCTURE: CPT

## 2021-09-15 PROCEDURE — 86695 HERPES SIMPLEX TYPE 1 TEST: CPT

## 2021-09-16 LAB
HSV1 IGG SERPL QL IA: 5.41 INDEX
HSV1 IGG SERPL QL IA: ABNORMAL
HSV2 IGG SERPL QL IA: 0.18 INDEX
HSV2 IGG SERPL QL IA: ABNORMAL

## 2021-09-16 NOTE — RESULT ENCOUNTER NOTE
HSV- type 1 is positive- that can be positive in cases of oral herpes or cold sores.  HSV type 2 is negative    Hard to determine /distinguish if positive test from cold sores herpes viral or sexually transmitted    Please keep us posted with questions or concerns .      Best Regards,    Yara Faust MD  [facility]  [phone]

## 2021-09-26 ENCOUNTER — HEALTH MAINTENANCE LETTER (OUTPATIENT)
Age: 31
End: 2021-09-26

## 2021-10-19 ENCOUNTER — LAB (OUTPATIENT)
Dept: LAB | Facility: CLINIC | Age: 31
End: 2021-10-19
Payer: COMMERCIAL

## 2021-10-19 ENCOUNTER — TELEPHONE (OUTPATIENT)
Dept: FAMILY MEDICINE | Facility: CLINIC | Age: 31
End: 2021-10-19

## 2021-10-19 ENCOUNTER — ANTICOAGULATION THERAPY VISIT (OUTPATIENT)
Dept: FAMILY MEDICINE | Facility: CLINIC | Age: 31
End: 2021-10-19

## 2021-10-19 DIAGNOSIS — Z86.73 HISTORY OF CVA (CEREBROVASCULAR ACCIDENT): ICD-10-CM

## 2021-10-19 DIAGNOSIS — D68.61 ANTIPHOSPHOLIPID SYNDROME (H): Primary | ICD-10-CM

## 2021-10-19 DIAGNOSIS — Z79.01 CHRONIC ANTICOAGULATION: ICD-10-CM

## 2021-10-19 DIAGNOSIS — D68.61 ANTIPHOSPHOLIPID ANTIBODY SYNDROME (H): ICD-10-CM

## 2021-10-19 DIAGNOSIS — D68.61 ANTIPHOSPHOLIPID ANTIBODY SYNDROME (H): Primary | ICD-10-CM

## 2021-10-19 DIAGNOSIS — Z86.711 HISTORY OF PULMONARY EMBOLISM: ICD-10-CM

## 2021-10-19 LAB — FACT X ACT/NOR PPP CHRO: 35 % (ref 70–130)

## 2021-10-19 PROCEDURE — 36415 COLL VENOUS BLD VENIPUNCTURE: CPT

## 2021-10-19 PROCEDURE — 85260 CLOT FACTOR X STUART-POWER: CPT

## 2021-10-19 NOTE — PROGRESS NOTES
Anticoagulation Monitoring CHROMOGENIC FACTOR 10   Latest Ref Rng & Units 70 - 130 %   10/19/2021 35 (L)   9/7/2021 35 (L)   7/28/2021 7/27/2021 34 (L)   6/15/2021 31 (L)   6/15/2021    5/6/2021 36 (L)   5/6/2021    3/9/2021 35 (L)   3/9/2021    1/27/2021 27 (L)   1/27/2021    12/15/2020 26 (L)   12/15/2020    11/17/2020 32 (L)       ANTICOAGULATION MANAGEMENT     Tone Marley 31 year old male is on warfarin with therapeutic result. (Goal Chromogenic Factor X 40-20%)    Recent labs: (last 7 days)     10/19/21  0752   [id number]*       ASSESSMENT     Source(s): Chart Review and Patient/Caregiver Call       Warfarin doses taken: Warfarin taken as instructed    Diet: No new diet changes identified    New illness, injury, or hospitalization: No    Medication/supplement changes: None noted    Signs or symptoms of bleeding or clotting: No    Previous result: Therapeutic last 2(+) visits    Additional findings: None     PLAN     Recommended plan for no diet, medication or health factor changes affecting result    Dosing Instructions: Continue your current warfarin dose 10 mg every Mon, Thu; 12.5 mg all other days with next Chromogenic Factor X in 6 weeks       Sent [employer] message with dosing and follow up instructions    Contact [phone]  to schedule and with any changes, questions or concerns.     Education provided: Please call back if any changes to your diet, medications or how you've been taking warfarin    Plan made per [facility] anticoagulation protocol    Fiordaliza Hatch, RN  Anticoagulation Clinic  10/19/2021    _______________________________________________________________________     Anticoagulation Episode Summary     Current INR goal:  2.0-3.0   TTR:  --   Target end date:  Indefinite   Send INR reminders to:  [facility]    Indications    Antiphospholipid antibody syndrome (H) [D68.61]  Chronic anticoagulation [Z79.01]           Comments:  Chromogenic Factor X (20-40%) per Roque Kidd PA-C *  transition from Xarelto;  Patient gives permission to leave a detailed message.         Anticoagulation Care Providers     Provider Role Specialty Phone number    Yara Faust MD Referring Family Medicine [phone]

## 2021-10-19 NOTE — TELEPHONE ENCOUNTER
ANTICOAGULATION MANAGEMENT      Tone Marley due for annual renewal of referral to anticoagulation monitoring. Order pended for your review and signature.      ANTICOAGULATION SUMMARY      Warfarin indication(s)     Antiphospholipid Antibody Syndrome    Heart valve present?  NO       Current goal range   Chromogenic Factor X:  40%-20%     Goal appropriate for indication? Yes, CF 10 of 40-20%  appropriate for hx of DVT, PE, Afib, LVAD, or bileaflet AVR without risk factors     Current duration of therapy Indefinite/long term therapy   Time in Therapeutic Range (TTR)  (Goal > 60%) NA%       Office visit with referring provider's group within last year yes on 9/9/2020       Fiordaliza Hatch, RN

## 2021-11-17 ENCOUNTER — VIRTUAL VISIT (OUTPATIENT)
Dept: ENDOCRINOLOGY | Facility: CLINIC | Age: 31
End: 2021-11-17
Payer: COMMERCIAL

## 2021-11-17 DIAGNOSIS — E29.1 HYPOGONADISM MALE: Primary | ICD-10-CM

## 2021-11-17 PROCEDURE — 99214 OFFICE O/P EST MOD 30 MIN: CPT | Mod: 95 | Performed by: INTERNAL MEDICINE

## 2021-11-17 NOTE — LETTER
11/17/2021       RE: Tone Marley  [address]  [place] [facility] [address]     Dear Colleague,    Thank you for referring your patient, Tone Marley, to the [facility] [place] at [facility]. Please see a copy of my visit note below.    Diabetes & Endocrinology Clinic Follow up Consult     Reason for visit/consult: Hypogonadism    Primary care provider: Yara Faust MD    HPI:  Tone Marley is a 31 y.o. male seen for f/u on secondary hypogonadism. labs were drawn and brain imaging and he was started on testosterone injections. He is now taking 150 mg q 2 weeks. He does not have any complaints today. Higher energy levels with testosterone replacement. Last visit w/ me was 2/8/21.     Always had low libido (more recently within past 1-2 yrs), had low energy, in the past, had hardest time losing weight, putting in 2 hrs at gym 2-4 times weekly, using [employer] was trying to stay under 2000 calories daily, low CHO diet. No kids. Unknown fertility (uses protection) - procreation has not been attempted. In the past, he c/o reduced erection time. Overall, is going better now with more physiologic testosterone levels.  He takes coumadin. 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    Results, look good    He is feeling: fine without complaints     Results for TOEN MARLEY (MRN [id number]) as of 11/17/2021 11:38   Ref. Range  "8/25/2020 09:07 1/12/2021 08:14 7/27/2021 08:30   Testosterone Total Latest Ref Range: 240 - 950 ng/dL 472 463 388     Results for ABILIO MAYES (MRN [id number]) as of 11/17/2021 11:38   Ref. Range 7/27/2021 08:30   PSA Latest Ref Range: 0.00 - 4.00 ug/L 0.92   Results for ABILIO MAYES (MRN [id number]) as of 11/17/2021 11:38   Ref. Range 7/27/2021 08:30   Hemoglobin Latest Ref Range: 13.3 - 17.7 g/dL 14.9     Past Medical/Surgical History:  Past Medical History:   Diagnosis Date     ADHD (attention deficit hyperactivity disorder) 6/27/2012     Antiphospholipid antibody syndromes     at age 13     Antiphospholipid syndrome (H) 2/2/2012    On anticoagulation coumdin 10 mg once daily       History of pulmonary embolism 9/2/2013    Was diagnosed  Positive CT scan in emergency department   now Under care of Dr Turcios at [facility],[id number] His nurse personal line [phone] Was on lovenox for 5 months  Now  For past one month on xareltlo 20 mg once daily and plaqunil twice daily       Hyperlipidemia LDL goal <130 6/11/2013     Hypertension goal BP (blood pressure) < 140/90 6/19/12     Hypertension, uncontrolled 6/27/2012     Postphlebitic syndrome 12/9/2014     Prehypertension 2/2/2012     Pulmonary embolism (H)     at age 13     Pulmonary embolism (H) 1/11/2015     Past Surgical History:   Procedure Laterality Date     wisdom teeth extraction       Allergies:  Allergies   Allergen Reactions     Nkda [No Known Drug Allergies]      Current Medications MEDICATIONS IN THIS CHART MAY NOT BE ACCURATE.    Current Outpatient Medications   Medication     ASPIRIN PO     atorvastatin (LIPITOR) 10 MG tablet     Blood Collection Needle 20G X 1\" MISC     hydrochlorothiazide (HYDRODIURIL) 25 MG tablet     losartan (COZAAR) 100 MG tablet     Needle, Disp, (BD DISP NEEDLES) 20G X 1\" MISC     Omega-3 Fatty Acids (FISH OIL PO)     Syringe/Needle, Disp, 20G X 1-1/2\" 3 ML MISC     Syringe/Needle, Disp, 20G X 1-1/2\" " 3 ML MISC     testosterone cypionate (DEPOTESTOSTERONE) 100 MG/ML injection     warfarin ANTICOAGULANT (COUMADIN) 10 MG tablet     warfarin ANTICOAGULANT (COUMADIN) 5 MG tablet     No current facility-administered medications for this visit.       Family History:  Family History   Problem Relation Age of Onset     Asthma Mother      Heart Surgery Father 60        CABG at 60     C.A.D. 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  Social History     Tobacco Use     Smoking status: Never Smoker     Smokeless tobacco: Former User     Types: Chew     Tobacco comment: Chew maybe once per week.   Substance Use Topics     Alcohol use: No     Alcohol/week: 0.0 standard drinks     Comment:  quit post stroke 7-2018       ROS:  see HPI    Exam  There were no vitals taken for this virtual visit  Gen: well appearing, nad, pleasant and conversant  HEENT: EOMI, no proptosis or lid lag, no goiter  Neuro: A&O    Labs/Imaging    See HPI    Assessment and Plan    Hypogonadism, doing well on testosterone rx, goals in him are a bit lower than average 30 yo male 2/2 vascular hx & inherent risks    Refills due about 2/13/2022    Patient Instructions:    Nice to talk with you today in virtual clinic!    Continue testosterone 150 mg IM q 2 weeks    Labs due annually 11/2022    RTC: February 2022, just prior to refill being due     Video: Start: 11/17/2021 12:00 pm  Stop: 11/17/2021 12:06 pm    Total Time: 30 min spent on chart review, evaluation, management, counseling, education, & motivational interviewing on the day of encounter      Answers for HPI/ROS submitted by the patient on 11/17/2021  General Symptoms: No  Skin Symptoms: No  HENT Symptoms: No  EYE SYMPTOMS: No  HEART SYMPTOMS: No  LUNG SYMPTOMS: No  INTESTINAL SYMPTOMS: No  URINARY SYMPTOMS: No  REPRODUCTIVE SYMPTOMS: No  SKELETAL SYMPTOMS: No  BLOOD SYMPTOMS: No  NERVOUS SYSTEM SYMPTOMS: No  MENTAL HEALTH SYMPTOMS: No        Tone Marley  is  being evaluated via a billable video visit.      How would you like to obtain your AVS? [employer]     For the video visit, send the invitation by: Text to cell phone: [phone]     Will anyone else be joining your video visit? No          Again, thank you for allowing me to participate in the care of your patient.      Sincerely,    Starla Zuleta MD

## 2021-11-17 NOTE — PROGRESS NOTES
Diabetes & Endocrinology Clinic Follow up Consult     Reason for visit/consult: Hypogonadism    Primary care provider: Yara Faust MD    HPI:  Tone Marley is a 31 y.o. male seen for f/u on secondary hypogonadism. labs were drawn and brain imaging and he was started on testosterone injections. He is now taking 150 mg q 2 weeks. He does not have any complaints today. Higher energy levels with testosterone replacement. Last visit w/ me was 2/8/21.     Always had low libido (more recently within past 1-2 yrs), had low energy, in the past, had hardest time losing weight, putting in 2 hrs at gym 2-4 times weekly, using [employer] was trying to stay under 2000 calories daily, low CHO diet. No kids. Unknown fertility (uses protection) - procreation has not been attempted. In the past, he c/o reduced erection time. Overall, is going better now with more physiologic testosterone levels.  He takes coumadin. 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    Results, look good    He is feeling: fine without complaints     Results for TONE MARLEY (MRN [id number]) as of 11/17/2021 11:38   Ref. Range 8/25/2020 09:07 1/12/2021 08:14 7/27/2021 08:30   Testosterone Total Latest Ref Range: 240 - 950 ng/dL 472 463 388     Results for TONE MARLEY (MRN [id number]) as of 11/17/2021 11:38   Ref. Range 7/27/2021 08:30   PSA Latest Ref Range: 0.00 - 4.00 ug/L 0.92   Results for BEATRIZJEAN CLAUDETONE (MRN [id number]) as of 11/17/2021  "11:38   Ref. Range 7/27/2021 08:30   Hemoglobin Latest Ref Range: 13.3 - 17.7 g/dL 14.9       Past Medical/Surgical History:  Past Medical History:   Diagnosis Date     ADHD (attention deficit hyperactivity disorder) 6/27/2012     Antiphospholipid antibody syndromes     at age 13     Antiphospholipid syndrome (H) 2/2/2012    On anticoagulation coumdin 10 mg once daily       History of pulmonary embolism 9/2/2013    Was diagnosed  Positive CT scan in emergency department   now Under care of Dr Turcios at [facility],[phone] His nurse personal line [phone] Was on lovenox for 5 months  Now  For past one month on xareltlo 20 mg once daily and plaqunil twice daily       Hyperlipidemia LDL goal <130 6/11/2013     Hypertension goal BP (blood pressure) < 140/90 6/19/12     Hypertension, uncontrolled 6/27/2012     Postphlebitic syndrome 12/9/2014     Prehypertension 2/2/2012     Pulmonary embolism (H)     at age 13     Pulmonary embolism (H) 1/11/2015     Past Surgical History:   Procedure Laterality Date     wisdom teeth extraction         Allergies:  Allergies   Allergen Reactions     Nkda [No Known Drug Allergies]        Current Medications MEDICATIONS IN THIS CHART MAY NOT BE ACCURATE.    Current Outpatient Medications   Medication     ASPIRIN PO     atorvastatin (LIPITOR) 10 MG tablet     Blood Collection Needle 20G X 1\" MISC     hydrochlorothiazide (HYDRODIURIL) 25 MG tablet     losartan (COZAAR) 100 MG tablet     Needle, Disp, (BD DISP NEEDLES) 20G X 1\" MISC     Omega-3 Fatty Acids (FISH OIL PO)     Syringe/Needle, Disp, 20G X 1-1/2\" 3 ML MISC     Syringe/Needle, Disp, 20G X 1-1/2\" 3 ML MISC     testosterone cypionate (DEPOTESTOSTERONE) 100 MG/ML injection     warfarin ANTICOAGULANT (COUMADIN) 10 MG tablet     warfarin ANTICOAGULANT (COUMADIN) 5 MG tablet     No current facility-administered medications for this visit.       Family History:  Family History   Problem Relation Age of Onset     Asthma " Mother      Heart Surgery Father 60        CABG at 60     C.A.D. 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  Social History     Tobacco Use     Smoking status: Never Smoker     Smokeless tobacco: Former User     Types: Chew     Tobacco comment: Chew maybe once per week.   Substance Use Topics     Alcohol use: No     Alcohol/week: 0.0 standard drinks     Comment:  quit post stroke 7-2018       ROS:  see HPI    Exam  There were no vitals taken for this virtual visit  Gen: well appearing, nad, pleasant and conversant  HEENT: EOMI, no proptosis or lid lag, no goiter  Neuro: A&O    Labs/Imaging    See HPI    Assessment and Plan    Hypogonadism, doing well on testosterone rx, goals in him are a bit lower than average 32 yo male 2/2 vascular hx & inherent risks    Refills due about 2/13/2022    Patient Instructions:    Nice to talk with you today in virtual clinic!    Continue testosterone 150 mg IM q 2 weeks    Labs due annually 11/2022    RTC: February 2022, just prior to refill being due     Best Wishes,  Dr. Starla Zuleta MD, MPH  Endocrinologist      Video: Start: 11/17/2021 12:00 pm  Stop: 11/17/2021 12:06 pm      Total Time: 30 min spent on chart review, evaluation, management, counseling, education, & motivational interviewing on the day of encounter      Answers for HPI/ROS submitted by the patient on 11/17/2021  General Symptoms: No  Skin Symptoms: No  HENT Symptoms: No  EYE SYMPTOMS: No  HEART SYMPTOMS: No  LUNG SYMPTOMS: No  INTESTINAL SYMPTOMS: No  URINARY SYMPTOMS: No  REPRODUCTIVE SYMPTOMS: No  SKELETAL SYMPTOMS: No  BLOOD SYMPTOMS: No  NERVOUS SYSTEM SYMPTOMS: No  MENTAL HEALTH SYMPTOMS: No

## 2021-11-17 NOTE — PROGRESS NOTES
Tone Marley  is being evaluated via a billable video visit.      How would you like to obtain your AVS? [employer]     For the video visit, send the invitation by: Text to cell phone: [phone]     Will anyone else be joining your video visit? No

## 2021-11-17 NOTE — PATIENT INSTRUCTIONS
Nice to talk with you today in virtual clinic!    Continue testosterone 150 mg IM q 2 weeks    Labs due annually 11/2022    RTC: February 2022, just prior to refill being due     Best Wishes,  Dr. Starla Zuleta MD, MPH  Endocrinologist

## 2021-11-21 ENCOUNTER — HEALTH MAINTENANCE LETTER (OUTPATIENT)
Age: 31
End: 2021-11-21

## 2021-12-01 ENCOUNTER — DOCUMENTATION ONLY (OUTPATIENT)
Dept: FAMILY MEDICINE | Facility: CLINIC | Age: 31
End: 2021-12-01
Payer: COMMERCIAL

## 2021-12-04 DIAGNOSIS — R79.89 LOW SERUM TESTOSTERONE: Primary | ICD-10-CM

## 2021-12-07 DIAGNOSIS — E29.1 HYPOGONADISM MALE: Primary | ICD-10-CM

## 2021-12-07 RX ORDER — SYRINGE WITH NEEDLE, 1 ML 25GX5/8"
SYRINGE, EMPTY DISPOSABLE MISCELLANEOUS
OUTPATIENT
Start: 2021-12-07

## 2021-12-07 NOTE — TELEPHONE ENCOUNTER
Syringe /needle    11/17/2021  [facility] Starla Ronquillo MD    Endocrinology, Diabetes, and Metabolism     Routed because: per pop up says it cant be ordered.

## 2021-12-21 ENCOUNTER — CARE COORDINATION (OUTPATIENT)
Dept: FAMILY MEDICINE | Facility: CLINIC | Age: 31
End: 2021-12-21

## 2021-12-21 DIAGNOSIS — I10 BENIGN ESSENTIAL HYPERTENSION: Primary | ICD-10-CM

## 2021-12-21 DIAGNOSIS — E78.2 MIXED HYPERLIPIDEMIA: ICD-10-CM

## 2021-12-22 ENCOUNTER — DOCUMENTATION ONLY (OUTPATIENT)
Dept: FAMILY MEDICINE | Facility: CLINIC | Age: 31
End: 2021-12-22
Payer: COMMERCIAL

## 2021-12-29 ENCOUNTER — TELEPHONE (OUTPATIENT)
Dept: FAMILY MEDICINE | Facility: CLINIC | Age: 31
End: 2021-12-29
Payer: COMMERCIAL

## 2021-12-29 DIAGNOSIS — E78.2 MIXED HYPERLIPIDEMIA: ICD-10-CM

## 2021-12-29 DIAGNOSIS — I10 BENIGN ESSENTIAL HYPERTENSION: ICD-10-CM

## 2021-12-29 RX ORDER — LOSARTAN POTASSIUM 100 MG/1
100 TABLET ORAL DAILY
Qty: 90 TABLET | Refills: 0 | Status: SHIPPED | OUTPATIENT
Start: 2021-12-29 | End: 2022-01-11

## 2021-12-29 RX ORDER — HYDROCHLOROTHIAZIDE 25 MG/1
25 TABLET ORAL DAILY
Qty: 90 TABLET | Refills: 0 | Status: SHIPPED | OUTPATIENT
Start: 2021-12-29 | End: 2022-01-11

## 2021-12-29 RX ORDER — ATORVASTATIN CALCIUM 10 MG/1
10 TABLET, FILM COATED ORAL DAILY
Qty: 90 TABLET | Refills: 0 | Status: SHIPPED | OUTPATIENT
Start: 2021-12-29 | End: 2022-01-11

## 2021-12-29 NOTE — TELEPHONE ENCOUNTER
Needs 90 day supply due to insurance not covering 30 day supply. T'd up these meds.  Also wanted to give FYI to PCP, has Factor 10 labs and added on lipid panel next week so should be available for upcoming appt.      Anne Sauer RN

## 2022-01-04 ASSESSMENT — ENCOUNTER SYMPTOMS
FEVER: 0
SORE THROAT: 0
FREQUENCY: 0
ABDOMINAL PAIN: 0
CONSTIPATION: 0
SHORTNESS OF BREATH: 0
MYALGIAS: 0
ARTHRALGIAS: 0
NAUSEA: 0
COUGH: 0
NERVOUS/ANXIOUS: 0
HEMATOCHEZIA: 0
CHILLS: 0
WEAKNESS: 0
EYE PAIN: 0
PALPITATIONS: 0
HEMATURIA: 0
DYSURIA: 0
HEADACHES: 0
PARESTHESIAS: 0
HEARTBURN: 0
DIZZINESS: 0
DIARRHEA: 0
JOINT SWELLING: 0

## 2022-01-05 ENCOUNTER — ANTICOAGULATION THERAPY VISIT (OUTPATIENT)
Dept: FAMILY MEDICINE | Facility: CLINIC | Age: 32
End: 2022-01-05

## 2022-01-05 ENCOUNTER — LAB (OUTPATIENT)
Dept: LAB | Facility: CLINIC | Age: 32
End: 2022-01-05
Payer: COMMERCIAL

## 2022-01-05 DIAGNOSIS — I10 BENIGN ESSENTIAL HYPERTENSION: ICD-10-CM

## 2022-01-05 DIAGNOSIS — Z86.73 HISTORY OF CVA (CEREBROVASCULAR ACCIDENT): ICD-10-CM

## 2022-01-05 DIAGNOSIS — D68.61 ANTIPHOSPHOLIPID ANTIBODY SYNDROME (H): ICD-10-CM

## 2022-01-05 DIAGNOSIS — Z79.01 CHRONIC ANTICOAGULATION: ICD-10-CM

## 2022-01-05 DIAGNOSIS — D68.61 ANTIPHOSPHOLIPID SYNDROME (H): ICD-10-CM

## 2022-01-05 DIAGNOSIS — Z86.711 HISTORY OF PULMONARY EMBOLISM: ICD-10-CM

## 2022-01-05 DIAGNOSIS — E78.2 MIXED HYPERLIPIDEMIA: ICD-10-CM

## 2022-01-05 DIAGNOSIS — D68.61 ANTIPHOSPHOLIPID ANTIBODY SYNDROME (H): Primary | ICD-10-CM

## 2022-01-05 LAB
ALBUMIN SERPL-MCNC: 3.8 G/DL (ref 3.4–5)
ALP SERPL-CCNC: 88 U/L (ref 40–150)
ALT SERPL W P-5'-P-CCNC: 46 U/L (ref 0–70)
ANION GAP SERPL CALCULATED.3IONS-SCNC: 5 MMOL/L (ref 3–14)
AST SERPL W P-5'-P-CCNC: 25 U/L (ref 0–45)
BILIRUB SERPL-MCNC: 1 MG/DL (ref 0.2–1.3)
BUN SERPL-MCNC: 18 MG/DL (ref 7–30)
CALCIUM SERPL-MCNC: 9.1 MG/DL (ref 8.5–10.1)
CHLORIDE BLD-SCNC: 103 MMOL/L (ref 94–109)
CHOLEST SERPL-MCNC: 155 MG/DL
CO2 SERPL-SCNC: 28 MMOL/L (ref 20–32)
CREAT SERPL-MCNC: 0.98 MG/DL (ref 0.66–1.25)
FACT X ACT/NOR PPP CHRO: 30 % (ref 70–130)
FASTING STATUS PATIENT QL REPORTED: YES
GFR SERPL CREATININE-BSD FRML MDRD: >90 ML/MIN/1.73M2
GLUCOSE BLD-MCNC: 107 MG/DL (ref 70–99)
HDLC SERPL-MCNC: 46 MG/DL
LDLC SERPL CALC-MCNC: 81 MG/DL
NONHDLC SERPL-MCNC: 109 MG/DL
POTASSIUM BLD-SCNC: 4.1 MMOL/L (ref 3.4–5.3)
PROT SERPL-MCNC: 7.7 G/DL (ref 6.8–8.8)
SODIUM SERPL-SCNC: 136 MMOL/L (ref 133–144)
TRIGL SERPL-MCNC: 138 MG/DL

## 2022-01-05 PROCEDURE — 85260 CLOT FACTOR X STUART-POWER: CPT

## 2022-01-05 PROCEDURE — 80061 LIPID PANEL: CPT

## 2022-01-05 PROCEDURE — 36415 COLL VENOUS BLD VENIPUNCTURE: CPT

## 2022-01-05 PROCEDURE — 80053 COMPREHEN METABOLIC PANEL: CPT

## 2022-01-05 NOTE — RESULT ENCOUNTER NOTE
Hi    Labs look good- fasting glucose slightly elevated- rest look good.      -Cholesterol levels are at your goal levels.  ADVISE: continuing your medication, a regular exercise program with at least 150 minutes of aerobic exercise per week, and eating a low saturated fat/low carbohydrate diet.  Also, you should recheck this fasting cholesterol panel in 12 months.  -Liver and gallbladder tests are normal (ALT,AST, Alk phos, bilirubin), kidney function is normal (Cr, GFR), sodium is normal, potassium is normal, calcium is normal, glucose is normal.  -Glucose is slight elevated and may be a sign of early diabetes (prediabetes). ADVISE:: eating a low carbohydrate diet, exercising, trying to lose weight (if necessary) and rechecking your glucose level in 12 months.    Please keep us posted with questions or concerns .      Best Regards,    Yara Faust MD  [facility]  [phone]

## 2022-01-05 NOTE — PROGRESS NOTES
Anticoagulation Monitoring CHROMOGENIC FACTOR 10   Latest Ref Rng & Units 70 - 130 %   1/5/2022 30 (L)   10/19/2021 35 (L)   10/19/2021    9/7/2021 35 (L)   7/28/2021 7/27/2021 34 (L)   6/15/2021 31 (L)   6/15/2021    5/6/2021 36 (L)   5/6/2021    3/9/2021 35 (L)   ASSESSMENT /SUBJECTIVE:    Today's Factor X result of 30% is therapeutic. Goal Factor X of 40-20%      Warfarin dose taken: Warfarin taken as instructed    Diet: No new diet changes identified    Medication changes/ interactions: No new medications/supplements affecting INR    Previous INR: Therapeutic     S/S of bleeding or thromboembolism: No    New injury or illness: No    Upcoming surgery, procedure or cardioversion: No    Additional findings: None      PLAN:    Telephone call with Tone who verbalizes understanding and agrees to plan regarding INR result and instructed:     Warfarin Dosing Instructions: Continue your current warfarin dose    Instructed patient to follow up no later than: 6 weeks  Lab visit scheduled    Education provided: Please call back if any changes to your diet, medications or how you've been taking warfarin      Tone verbalizes understanding and agrees to warfarin dosing plan.    Instructed to call the Anticoagulation Clinic for any changes, questions or concerns. (#[phone])        Tana Iqbal RN

## 2022-01-06 ASSESSMENT — ENCOUNTER SYMPTOMS
PARESTHESIAS: 0
EYE PAIN: 0
SORE THROAT: 0
NERVOUS/ANXIOUS: 0
DYSURIA: 0
CONSTIPATION: 0
DIZZINESS: 0
SHORTNESS OF BREATH: 0
ARTHRALGIAS: 0
NAUSEA: 0
JOINT SWELLING: 0
FREQUENCY: 0
MYALGIAS: 0
CHILLS: 0
HEADACHES: 0
DIARRHEA: 0
FEVER: 0
HEARTBURN: 0
HEMATOCHEZIA: 0
HEMATURIA: 0
PALPITATIONS: 0
WEAKNESS: 0
ABDOMINAL PAIN: 0
COUGH: 0

## 2022-01-07 NOTE — PROGRESS NOTES
SUBJECTIVE:   CC: Tone Marley is an 31 year old male who presents for preventative health visit.       Patient has been advised of split billing requirements and indicates understanding: Yes  Healthy Habits:     Getting at least 3 servings of Calcium per day:  Yes    Bi-annual eye exam:  NO    Dental care twice a year:  Yes    Sleep apnea or symptoms of sleep apnea:  None    Diet:  Regular (no restrictions) and Carbohydrate counting    Frequency of exercise:  4-5 days/week    Duration of exercise:  45-60 minutes    Taking medications regularly:  Yes    Medication side effects:  None    PHQ-2 Total Score: 1    Additional concerns today:  No          PROBLEMS TO ADD ON...no concerns  Labs reviewed  Glucose, fasting slightly high  Wt gain from  Holiday & excess alcohol consumption  and has plans to keep calories limited to < 2000, excercise 5 d/wk- cardio.Reports he is motivated for intentional weight loss  Weight gain accelerated because working from  Home for [employer] & 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 Yes    Have you ever done Advance Care Planning? (For example, a Health Directive, POLST, or a discussion with a medical provider or your loved ones about your wishes): No, advance care planning information given to patient to review.  Patient declined advance care planning discussion at this time.    Social History     Tobacco Use     Smoking status: Never Smoker      Smokeless tobacco: Former User     Types: Chew     Tobacco comment: Chew maybe once per week.   Substance Use Topics     Alcohol use: Yes     Alcohol/week: 0.0 standard drinks     Comment:  quit post stroke 7-2018     If you drink alcohol do you typically have >3 drinks per day or >7 drinks per week? No    No flowsheet data found.    Last PSA:   PSA   Date Value Ref Range Status   10/30/2019 0.59 0 - 4 ug/L Final     Comment:     Assay Method:  Chemiluminescence using Siemens Vista analyzer     PSA Tumor Marker   Date Value Ref Range Status   07/27/2021 0.92 0.00 - 4.00 ug/L Final       Reviewed orders with patient. Reviewed health maintenance and updated orders accordingly - Yes  BP Readings from Last 3 Encounters:   01/11/22 124/77   09/29/20 125/78   02/24/20 (!) 147/90    Wt Readings from Last 3 Encounters:   01/11/22 108.2 kg (238 lb 8 oz)   09/29/20 112 kg (247 lb)   02/24/20 117.9 kg (260 lb)                    Reviewed and updated as needed this visit by clinical staff  Tobacco  Allergies  Meds  Problems  Med Hx  Surg Hx  Fam Hx         Reviewed and updated as needed this visit by Provider  Tobacco  Allergies  Meds  Problems  Med Hx  Surg Hx  Fam Hx            Review of Systems   Constitutional: Negative for chills and fever.   HENT: Negative for congestion, ear pain, hearing loss and sore throat.    Eyes: Negative for pain and visual disturbance.   Respiratory: Negative for cough and shortness of breath.    Cardiovascular: Negative for chest pain, palpitations and peripheral edema.   Gastrointestinal: Negative for abdominal pain, constipation, diarrhea, heartburn, hematochezia and nausea.   Genitourinary: Negative for dysuria, frequency, genital sores, hematuria, impotence, penile discharge and urgency.   Musculoskeletal: Negative for arthralgias, joint swelling and myalgias.   Skin: Negative for rash.   Neurological: Negative for dizziness, weakness, headaches and paresthesias.  "  Psychiatric/Behavioral: Negative for mood changes. The patient is not nervous/anxious.        OBJECTIVE:   /77   Pulse 67   Temp 98.9  F (37.2  C) (Temporal)   Ht 1.807 m (5' 11.14\")   Wt 108.2 kg (238 lb 8 oz)   SpO2 97%   BMI 33.13 kg/m      Physical Exam  GENERAL: healthy, alert and no distress  EYES: Eyes grossly normal to inspection, PERRL and conjunctivae and sclerae normal  HENT: ear canals and TM's normal, nose and mouth without ulcers or lesions  NECK: no adenopathy, no asymmetry, masses, or scars and thyroid normal to palpation  RESP: lungs clear to auscultation - no rales, rhonchi or wheezes  CV: regular rate and rhythm, normal S1 S2, no S3 or S4, no murmur, click or rub, no peripheral edema and peripheral pulses strong  ABDOMEN: soft, nontender, no hepatosplenomegaly, no masses and bowel sounds normal  MS: no gross musculoskeletal defects noted, no edema  SKIN: no suspicious lesions or rashes  NEURO: Normal strength and tone, mentation intact and speech normal  PSYCH: mentation appears normal, affect normal/bright    Diagnostic Test Results:  Labs reviewed in Epic  No results found for any visits on 01/11/22.    ASSESSMENT/PLAN:   Tone was seen today for physical.    Diagnoses and all orders for this visit:    Routine general medical examination at a health care facility    Morbid obesity (H)  Wt gain from  Holiday and has plans to keep calories limited to < 2000, excercise 5 d/wk- cardio.  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  Recheck advised in 6-12 months along with a1c.  Labs futured      Antiphospholipid syndrome (H)  History of lower extremity DVT , right iliac vein and large left sided pulmonary embolism at age 13 &  subsequently was found to have antiphospholipid antibody syndrome (APLS),   Chronic anticoagulation  History of pulmonary embolism  Comments:MRI of " "the brain ` 7/2/2018- PRES strength syndrome vs cavernous sinus.   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 His BP was found to be 170/110 on admission as well. He was placed on Aspirin was well as Lipitor with recommendation of better BP control.     Current coumadin doses 10 mg M, Th, and 5 mg rest of the week    History of CVA (cerebrovascular accident)  Stroked  DOAC- and since then has been on coumadin and well managed with current dose  Follow anti coag nurse triage for routine labs     Benign essential hypertension-controlled  Comments:  Controlled- on current medications- refilled for 1 yr  Orders:  -     hydrochlorothiazide (HYDRODIURIL) 25 MG tablet; Take 1 tablet (25 mg) by mouth daily  -     losartan (COZAAR) 100 MG tablet; Take 1 tablet (100 mg) by mouth daily        Need for vaccination  Comments:  Counseled &  Booster offered & patient declined    Mixed hyperlipidemia  Comments:  improving- with life style changes and current medication- refilled for 1 yr  Orders:  -     atorvastatin (LIPITOR) 10 MG tablet; Take 1 tablet (10 mg) by mouth daily  Recent Labs   Lab Test 01/05/22  0824 09/22/20  0757   CHOL 155 99   HDL 46 40   LDL 81 44   TRIG 138 76       Other orders  -     INFLUENZA VACCINE IM > 6 MONTHS VALENT IIV4 (AFLURIA/FLUZONE)        Patient has been advised of split billing requirements and indicates understanding: Yes  COUNSELING:   Reviewed preventive health counseling, as reflected in patient instructions       Regular exercise       Healthy diet/nutrition       Vision screening       Hearing screening       Alcohol Use     Estimated body mass index is 33.13 kg/m  as calculated from the following:    Height as of this encounter: 1.807 m (5' 11.14\").    Weight as of this encounter: 108.2 kg (238 lb 8 oz).     Weight management plan: Discussed healthy diet and exercise guidelines    He reports that he has never smoked. He quit smokeless tobacco use about 3 years " ago.  His smokeless tobacco use included chew.      Counseling Resources:  ATP IV Guidelines  Pooled Cohorts Equation Calculator  FRAX Risk Assessment  ICSI Preventive Guidelines  Dietary Guidelines for Americans, 2010  USDA's MyPlate  ASA Prophylaxis  Lung CA Screening    Yara Faust MD  [facility]

## 2022-01-11 ENCOUNTER — OFFICE VISIT (OUTPATIENT)
Dept: FAMILY MEDICINE | Facility: CLINIC | Age: 32
End: 2022-01-11
Payer: COMMERCIAL

## 2022-01-11 VITALS
TEMPERATURE: 98.9 F | HEART RATE: 67 BPM | HEIGHT: 71 IN | DIASTOLIC BLOOD PRESSURE: 77 MMHG | WEIGHT: 238.5 LBS | OXYGEN SATURATION: 97 % | BODY MASS INDEX: 33.39 KG/M2 | SYSTOLIC BLOOD PRESSURE: 124 MMHG

## 2022-01-11 DIAGNOSIS — E78.2 MIXED HYPERLIPIDEMIA: ICD-10-CM

## 2022-01-11 DIAGNOSIS — Z79.01 CHRONIC ANTICOAGULATION: ICD-10-CM

## 2022-01-11 DIAGNOSIS — Z86.711 HISTORY OF PULMONARY EMBOLISM: ICD-10-CM

## 2022-01-11 DIAGNOSIS — E66.01 MORBID OBESITY (H): ICD-10-CM

## 2022-01-11 DIAGNOSIS — I10 BENIGN ESSENTIAL HYPERTENSION: ICD-10-CM

## 2022-01-11 DIAGNOSIS — Z00.00 ROUTINE GENERAL MEDICAL EXAMINATION AT A HEALTH CARE FACILITY: Primary | ICD-10-CM

## 2022-01-11 DIAGNOSIS — Z86.73 HISTORY OF CVA (CEREBROVASCULAR ACCIDENT): ICD-10-CM

## 2022-01-11 DIAGNOSIS — D68.61 ANTIPHOSPHOLIPID SYNDROME (H): ICD-10-CM

## 2022-01-11 DIAGNOSIS — Z23 NEED FOR VACCINATION: ICD-10-CM

## 2022-01-11 DIAGNOSIS — R73.09 HIGH GLUCOSE: ICD-10-CM

## 2022-01-11 PROCEDURE — 90686 IIV4 VACC NO PRSV 0.5 ML IM: CPT | Performed by: FAMILY MEDICINE

## 2022-01-11 PROCEDURE — 99395 PREV VISIT EST AGE 18-39: CPT | Mod: 25 | Performed by: FAMILY MEDICINE

## 2022-01-11 PROCEDURE — 90471 IMMUNIZATION ADMIN: CPT | Performed by: FAMILY MEDICINE

## 2022-01-11 RX ORDER — HYDROCHLOROTHIAZIDE 25 MG/1
25 TABLET ORAL DAILY
Qty: 90 TABLET | Refills: 3 | Status: SHIPPED | OUTPATIENT
Start: 2022-01-11 | End: 2023-02-01

## 2022-01-11 RX ORDER — ATORVASTATIN CALCIUM 10 MG/1
10 TABLET, FILM COATED ORAL DAILY
Qty: 90 TABLET | Refills: 3 | Status: SHIPPED | OUTPATIENT
Start: 2022-01-11 | End: 2023-02-01

## 2022-01-11 RX ORDER — LOSARTAN POTASSIUM 100 MG/1
100 TABLET ORAL DAILY
Qty: 90 TABLET | Refills: 3 | Status: SHIPPED | OUTPATIENT
Start: 2022-01-11 | End: 2023-02-01

## 2022-01-11 ASSESSMENT — MIFFLIN-ST. JEOR: SCORE: 2051.21

## 2022-02-13 DIAGNOSIS — D68.61 ANTIPHOSPHOLIPID ANTIBODY SYNDROME (H): ICD-10-CM

## 2022-02-13 DIAGNOSIS — R79.89 LOW SERUM TESTOSTERONE: ICD-10-CM

## 2022-02-13 DIAGNOSIS — Z79.01 CHRONIC ANTICOAGULATION: ICD-10-CM

## 2022-02-13 DIAGNOSIS — Z86.73 HISTORY OF CVA (CEREBROVASCULAR ACCIDENT): ICD-10-CM

## 2022-02-13 DIAGNOSIS — Z86.711 HISTORY OF PULMONARY EMBOLISM: ICD-10-CM

## 2022-02-13 DIAGNOSIS — E29.1 HYPOGONADISM MALE: ICD-10-CM

## 2022-02-15 ENCOUNTER — MYC MEDICAL ADVICE (OUTPATIENT)
Dept: FAMILY MEDICINE | Facility: CLINIC | Age: 32
End: 2022-02-15
Payer: COMMERCIAL

## 2022-02-16 ENCOUNTER — LAB (OUTPATIENT)
Dept: LAB | Facility: CLINIC | Age: 32
End: 2022-02-16
Payer: COMMERCIAL

## 2022-02-16 ENCOUNTER — ANTICOAGULATION THERAPY VISIT (OUTPATIENT)
Dept: ANTICOAGULATION | Facility: CLINIC | Age: 32
End: 2022-02-16

## 2022-02-16 DIAGNOSIS — Z86.73 HISTORY OF CVA (CEREBROVASCULAR ACCIDENT): ICD-10-CM

## 2022-02-16 DIAGNOSIS — D68.61 ANTIPHOSPHOLIPID ANTIBODY SYNDROME (H): ICD-10-CM

## 2022-02-16 DIAGNOSIS — D68.61 ANTIPHOSPHOLIPID ANTIBODY SYNDROME (H): Primary | ICD-10-CM

## 2022-02-16 DIAGNOSIS — D68.61 ANTIPHOSPHOLIPID SYNDROME (H): ICD-10-CM

## 2022-02-16 DIAGNOSIS — Z86.711 HISTORY OF PULMONARY EMBOLISM: ICD-10-CM

## 2022-02-16 DIAGNOSIS — Z79.01 CHRONIC ANTICOAGULATION: ICD-10-CM

## 2022-02-16 LAB — FACT X ACT/NOR PPP CHRO: 50 % (ref 70–130)

## 2022-02-16 PROCEDURE — 36415 COLL VENOUS BLD VENIPUNCTURE: CPT

## 2022-02-16 PROCEDURE — 85260 CLOT FACTOR X STUART-POWER: CPT

## 2022-02-16 RX ORDER — TESTOSTERONE CYPIONATE 1000 MG/10ML
INJECTION, SOLUTION INTRAMUSCULAR
Qty: 10 ML | Refills: 1 | Status: SHIPPED | OUTPATIENT
Start: 2022-02-16 | End: 2022-08-31

## 2022-02-16 NOTE — TELEPHONE ENCOUNTER
TESTOSTERONE CYP 100MG/ML MDV 10ML  Inject 1.5 mLs (150 mg) into the muscle every 14 days  Last Written Prescription Date:  8/13/2021  Last Fill Quantity: 10 ml ,   # refills: 1  Last Office Visit : 11/17/2021  Future Office visit:  None  Labs 7/27/21 testosterone, PSA and HGB    Per 11/17/21 visit :Labs due annually 11/2022   RTC: February 2022, just prior to refill being due  Routing refill request to provider for review/approval because:  Controlled substance

## 2022-02-16 NOTE — PROGRESS NOTES
ANTICOAGULATION MANAGEMENT     Tone Marley 31 year old male is on warfarin with subtherapeutic result. (Goal Chromogenic Factor X 40-20%)    Anticoagulation Monitoring CHROMOGENIC FACTOR 10   Latest Ref Rng & Units 70 - 130 %   2/16/2022 50 (L)   1/5/2022 30 (L)   10/19/2021 35 (L)   10/19/2021    9/7/2021 35 (L)   7/28/2021 7/27/2021 34 (L)   6/15/2021 31 (L)   6/15/2021    5/6/2021 36 (L)       ASSESSMENT     Source(s): Chart Review and Patient/Caregiver Call       Warfarin doses taken: Missed dose(s) may be affecting result(s)    Diet: Pt states he is trying to eat more healthy but this does NOT include an increase in greens/vit K    New illness, injury, or hospitalization: No    Medication/supplement changes: None noted    Signs or symptoms of bleeding or clotting: No    Previous result: Therapeutic last 2(+) visits    Additional findings: None     PLAN     Recommended plan for temporary change(s) affecting result    Dosing Instructions: Booster dose then continue your current warfarin dose 10mg Mon, Thu; 12.5mg all other days with next Chromogenic Factor X in 2 weeks       Telephone call with Tone who verbalizes understanding and agrees to plan    Lab visit scheduled    Education provided: Goal range and significance of current result    Plan made with [facility] Pharmacist Priscila Brito, [facility]  Anticoagulation Clinic  2/16/2022    _______________________________________________________________________     Anticoagulation Episode Summary     Current INR goal:  2.0-3.0   TTR:  --   Target end date:  Indefinite   Send INR reminders to:  [facility]    Indications    Antiphospholipid antibody syndrome (H) [D68.61]  Chronic anticoagulation [Z79.01]  Antiphospholipid syndrome (H) [D68.61]  History of pulmonary embolism [Z86.711]  History of CVA (cerebrovascular accident) [Z86.73]           Comments:  Chromogenic Factor X (20-40%) per Roque Kidd PA-C * transition from Xarelto;  Patient gives  permission to leave a detailed message.         Anticoagulation Care Providers     Provider Role Specialty Phone number    Yara Faust MD Referring Family Medicine [phone]

## 2022-03-03 ENCOUNTER — LAB (OUTPATIENT)
Dept: LAB | Facility: CLINIC | Age: 32
End: 2022-03-03
Payer: COMMERCIAL

## 2022-03-03 ENCOUNTER — ANTICOAGULATION THERAPY VISIT (OUTPATIENT)
Dept: FAMILY MEDICINE | Facility: CLINIC | Age: 32
End: 2022-03-03

## 2022-03-03 DIAGNOSIS — Z86.73 HISTORY OF CVA (CEREBROVASCULAR ACCIDENT): ICD-10-CM

## 2022-03-03 DIAGNOSIS — Z79.01 CHRONIC ANTICOAGULATION: ICD-10-CM

## 2022-03-03 DIAGNOSIS — Z86.711 HISTORY OF PULMONARY EMBOLISM: ICD-10-CM

## 2022-03-03 DIAGNOSIS — D68.61 ANTIPHOSPHOLIPID SYNDROME (H): ICD-10-CM

## 2022-03-03 DIAGNOSIS — D68.61 ANTIPHOSPHOLIPID ANTIBODY SYNDROME (H): ICD-10-CM

## 2022-03-03 DIAGNOSIS — D68.61 ANTIPHOSPHOLIPID ANTIBODY SYNDROME (H): Primary | ICD-10-CM

## 2022-03-03 LAB — FACT X ACT/NOR PPP CHRO: 43 % (ref 70–130)

## 2022-03-03 PROCEDURE — 36415 COLL VENOUS BLD VENIPUNCTURE: CPT

## 2022-03-03 PROCEDURE — 85260 CLOT FACTOR X STUART-POWER: CPT

## 2022-03-03 NOTE — PROGRESS NOTES
"ANTICOAGULATION MANAGEMENT     Tone Marley 31 year old male is on warfarin with subtherapeutic result. (Goal Chromogenic Factor X 40-20%)    Recent labs: (last 7 days)     03/03/22  0751   [id number] 43*       ASSESSMENT     Source(s): Chart Review and Patient/Caregiver Call       Warfarin doses taken: Warfarin taken as instructed    Diet: No new diet changes identified    New illness, injury, or hospitalization: No    Medication/supplement changes: None noted    Signs or symptoms of bleeding or clotting: No    Previous result: Subtherapeutic    Additional findings: Trying to eat \"heathier\" but not increasing Vit K food intake. Also missed a coumadin dose 3 weeks ago.       PLAN     Recommended plan for no diet, medication or health factor changes affecting result    Dosing Instructions:  Increase your warfarin dose to 12.5 mg daily (6% change) with next Chromogenic Factor X in 2 weeks       Sent [employer] message with dosing and follow up instructions    Contact [phone]  to schedule and with any changes, questions or concerns.     Education provided: Please call back if any changes to your diet, medications or how you've been taking warfarin    Plan made with [facility] Pharmacist Michelle Arvizu      "

## 2022-03-03 NOTE — PROGRESS NOTES
Chart reviewed with [facility] RN at time of encounter.    Advise maintenance dose increase ~6%, although patient states not a lot of increased greens/vitamin K added to diet, as overall health improves, may be decreasing inflammation and affecting general health status/anticoagulation status.    Michelle Arvizu, PharmD BCACP  Anticoagulation Clinical Pharmacist

## 2022-03-03 NOTE — PROGRESS NOTES
Anticoagulation Monitoring CHROMOGENIC FACTOR 10   Latest Ref Rng & Units 70 - 130 %   3/3/2022 43 (L)   2/16/2022 50 (L)   1/5/2022 30 (L)   10/19/2021 35 (L)   10/19/2021    9/7/2021 35 (L)   7/28/2021 7/27/2021 34 (L)   6/15/2021 31 (L)     RPH:  Please review. Appears Missed dose 3 weeks ago but no other overall changes.  Thank you.  Fiordaliza Hatch, RN  Anticoagulation Nurse - [facility] [place]

## 2022-03-15 ENCOUNTER — LAB (OUTPATIENT)
Dept: LAB | Facility: CLINIC | Age: 32
End: 2022-03-15
Payer: COMMERCIAL

## 2022-03-15 DIAGNOSIS — Z79.01 CHRONIC ANTICOAGULATION: ICD-10-CM

## 2022-03-15 DIAGNOSIS — Z86.73 HISTORY OF CVA (CEREBROVASCULAR ACCIDENT): ICD-10-CM

## 2022-03-15 DIAGNOSIS — D68.61 ANTIPHOSPHOLIPID ANTIBODY SYNDROME (H): ICD-10-CM

## 2022-03-15 PROCEDURE — 85260 CLOT FACTOR X STUART-POWER: CPT

## 2022-03-15 PROCEDURE — 36415 COLL VENOUS BLD VENIPUNCTURE: CPT

## 2022-03-16 ENCOUNTER — ANTICOAGULATION THERAPY VISIT (OUTPATIENT)
Dept: ANTICOAGULATION | Facility: CLINIC | Age: 32
End: 2022-03-16
Payer: COMMERCIAL

## 2022-03-16 DIAGNOSIS — D68.61 ANTIPHOSPHOLIPID SYNDROME (H): ICD-10-CM

## 2022-03-16 DIAGNOSIS — Z79.01 CHRONIC ANTICOAGULATION: ICD-10-CM

## 2022-03-16 DIAGNOSIS — Z86.73 HISTORY OF CVA (CEREBROVASCULAR ACCIDENT): ICD-10-CM

## 2022-03-16 DIAGNOSIS — Z86.711 HISTORY OF PULMONARY EMBOLISM: ICD-10-CM

## 2022-03-16 DIAGNOSIS — D68.61 ANTIPHOSPHOLIPID ANTIBODY SYNDROME (H): Primary | ICD-10-CM

## 2022-03-16 LAB — FACT X ACT/NOR PPP CHRO: 32 % (ref 70–130)

## 2022-03-16 RX ORDER — WARFARIN SODIUM 10 MG/1
TABLET ORAL
Qty: 90 TABLET | Refills: 1 | Status: SHIPPED | OUTPATIENT
Start: 2022-03-16 | End: 2022-09-19

## 2022-03-16 NOTE — PROGRESS NOTES
ANTICOAGULATION MANAGEMENT     Tone Marley 31 year old male is on warfarin with therapeutic result. (Goal Chromogenic Factor X 40-20%)    Recent labs: (last 7 days)     03/15/22  1020   [id number] 32*       ASSESSMENT     Source(s): Chart Review and Patient/Caregiver Call       Warfarin doses taken: Warfarin taken as instructed    Diet: No new diet changes identified    New illness, injury, or hospitalization: No    Medication/supplement changes: None noted    Signs or symptoms of bleeding or clotting: No    Previous result: Subtherapeutic    Additional findings: refill needed of 10 mg tablet. Sent into pharmacy.       PLAN     Recommended plan for no diet, medication or health factor changes affecting result    Dosing Instructions: Continue your current warfarin dose 12.5 mg daily with next Chromogenic Factor X in 3 weeks       Telephone call with Tone who verbalizes understanding and agrees to plan    Lab visit scheduled    Education provided: None required    Plan made per [facility] anticoagulation protocol

## 2022-04-10 ENCOUNTER — MYC MEDICAL ADVICE (OUTPATIENT)
Dept: FAMILY MEDICINE | Facility: CLINIC | Age: 32
End: 2022-04-10
Payer: COMMERCIAL

## 2022-04-10 DIAGNOSIS — Z11.3 SCREEN FOR STD (SEXUALLY TRANSMITTED DISEASE): Primary | ICD-10-CM

## 2022-04-12 ENCOUNTER — LAB (OUTPATIENT)
Dept: LAB | Facility: CLINIC | Age: 32
End: 2022-04-12
Payer: COMMERCIAL

## 2022-04-12 DIAGNOSIS — Z86.73 HISTORY OF CVA (CEREBROVASCULAR ACCIDENT): ICD-10-CM

## 2022-04-12 DIAGNOSIS — Z79.01 CHRONIC ANTICOAGULATION: ICD-10-CM

## 2022-04-12 DIAGNOSIS — D68.61 ANTIPHOSPHOLIPID ANTIBODY SYNDROME (H): ICD-10-CM

## 2022-04-12 PROCEDURE — 36415 COLL VENOUS BLD VENIPUNCTURE: CPT

## 2022-04-12 PROCEDURE — 85260 CLOT FACTOR X STUART-POWER: CPT

## 2022-04-13 ENCOUNTER — ANTICOAGULATION THERAPY VISIT (OUTPATIENT)
Dept: ANTICOAGULATION | Facility: CLINIC | Age: 32
End: 2022-04-13
Payer: COMMERCIAL

## 2022-04-13 DIAGNOSIS — Z79.01 CHRONIC ANTICOAGULATION: ICD-10-CM

## 2022-04-13 DIAGNOSIS — D68.61 ANTIPHOSPHOLIPID SYNDROME (H): ICD-10-CM

## 2022-04-13 DIAGNOSIS — Z86.73 HISTORY OF CVA (CEREBROVASCULAR ACCIDENT): ICD-10-CM

## 2022-04-13 DIAGNOSIS — Z86.711 HISTORY OF PULMONARY EMBOLISM: ICD-10-CM

## 2022-04-13 DIAGNOSIS — D68.61 ANTIPHOSPHOLIPID ANTIBODY SYNDROME (H): Primary | ICD-10-CM

## 2022-04-13 LAB — FACT X ACT/NOR PPP CHRO: 34 % (ref 70–130)

## 2022-04-13 NOTE — PROGRESS NOTES
Anticoagulation Monitoring CHROMOGENIC FACTOR 10   Latest Ref Rng & Units 70 - 130 %   4/12/2022 34 (L)   3/16/2022    3/15/2022 32 (L)   3/3/2022 43 (L)   2/16/2022 50 (L)   1/5/2022 30 (L)   10/19/2021 35 (L)       ANTICOAGULATION MANAGEMENT     Tone Marley 32 year old male is on warfarin with therapeutic result. (Goal Chromogenic Factor X 40-20%)    Recent labs: (last 7 days)     04/12/22  0845   [id number] 34*       ASSESSMENT     Source(s): Chart Review and Patient/Caregiver Call       Warfarin doses taken: Warfarin taken as instructed    Diet: No new diet changes identified    New illness, injury, or hospitalization: No    Medication/supplement changes: None noted    Signs or symptoms of bleeding or clotting: No    Previous result: Therapeutic last 2(+) visits    Additional findings: None       PLAN     Recommended plan for no diet, medication or health factor changes affecting result    Dosing Instructions: Continue your current warfarin dose 12.5 mg every day with next Chromogenic Factor X in 6 weeks       Sent [employer] message with dosing and follow up instructions    Contact [phone]  to schedule and with any changes, questions or concerns.     Education provided: Please call back if any changes to your diet, medications or how you've been taking warfarin    Plan made per [facility] anticoagulation protocol

## 2022-05-31 ENCOUNTER — ANTICOAGULATION THERAPY VISIT (OUTPATIENT)
Dept: ANTICOAGULATION | Facility: CLINIC | Age: 32
End: 2022-05-31

## 2022-05-31 ENCOUNTER — LAB (OUTPATIENT)
Dept: LAB | Facility: CLINIC | Age: 32
End: 2022-05-31
Payer: COMMERCIAL

## 2022-05-31 ENCOUNTER — DOCUMENTATION ONLY (OUTPATIENT)
Dept: LAB | Facility: CLINIC | Age: 32
End: 2022-05-31

## 2022-05-31 DIAGNOSIS — Z86.711 HISTORY OF PULMONARY EMBOLISM: ICD-10-CM

## 2022-05-31 DIAGNOSIS — D68.61 ANTIPHOSPHOLIPID ANTIBODY SYNDROME (H): ICD-10-CM

## 2022-05-31 DIAGNOSIS — Z79.01 CHRONIC ANTICOAGULATION: ICD-10-CM

## 2022-05-31 DIAGNOSIS — D68.61 ANTIPHOSPHOLIPID ANTIBODY SYNDROME (H): Primary | ICD-10-CM

## 2022-05-31 DIAGNOSIS — R73.09 HIGH GLUCOSE: ICD-10-CM

## 2022-05-31 DIAGNOSIS — Z86.73 HISTORY OF CVA (CEREBROVASCULAR ACCIDENT): ICD-10-CM

## 2022-05-31 DIAGNOSIS — E29.1 HYPOGONADISM MALE: Primary | ICD-10-CM

## 2022-05-31 DIAGNOSIS — D68.61 ANTIPHOSPHOLIPID SYNDROME (H): ICD-10-CM

## 2022-05-31 DIAGNOSIS — Z11.3 SCREEN FOR STD (SEXUALLY TRANSMITTED DISEASE): ICD-10-CM

## 2022-05-31 LAB
FACT X ACT/NOR PPP CHRO: 40 % (ref 70–130)
FASTING STATUS PATIENT QL REPORTED: YES
GLUCOSE BLD-MCNC: 114 MG/DL (ref 70–99)
HBA1C MFR BLD: 4.9 % (ref 0–5.6)
HIV 1+2 AB+HIV1 P24 AG SERPL QL IA: NONREACTIVE
HOLD SPECIMEN: NORMAL
T PALLIDUM AB SER QL: NONREACTIVE

## 2022-05-31 PROCEDURE — 85260 CLOT FACTOR X STUART-POWER: CPT

## 2022-05-31 PROCEDURE — 36415 COLL VENOUS BLD VENIPUNCTURE: CPT

## 2022-05-31 PROCEDURE — 83036 HEMOGLOBIN GLYCOSYLATED A1C: CPT

## 2022-05-31 PROCEDURE — 86780 TREPONEMA PALLIDUM: CPT

## 2022-05-31 PROCEDURE — 87389 HIV-1 AG W/HIV-1&-2 AB AG IA: CPT

## 2022-05-31 PROCEDURE — 82947 ASSAY GLUCOSE BLOOD QUANT: CPT

## 2022-05-31 PROCEDURE — 87491 CHLMYD TRACH DNA AMP PROBE: CPT

## 2022-05-31 NOTE — PROGRESS NOTES
Pt states he needs his testosterone free and total checked, the order is not released. Please release the order if necessary. Thank you

## 2022-05-31 NOTE — PROGRESS NOTES
Dr coronel and Dear Triage,    I do not see  Pending orders- they have been managed by Starla Pizarro

## 2022-05-31 NOTE — PROGRESS NOTES
ANTICOAGULATION MANAGEMENT     Tone Marley 32 year old male is on warfarin with therapeutic result. (Goal Chromogenic Factor X 40-20%)    Recent labs: (last 7 days)     05/31/22  0828   [id number] 40*       ASSESSMENT     Source(s): Chart Review and Patient/Caregiver Call       Warfarin doses taken: Warfarin taken as instructed    Diet: No new diet changes identified    New illness, injury, or hospitalization: No    Medication/supplement changes: pt has been taking Supplements more consistently zinc, magnesium, probiotic and MVI--MVI brand changed recently--no K but oes have 30 mg of Vit C which may diminish warfarin impact--unsure what amount was for Vit C in last MVI    Signs or symptoms of bleeding or clotting: No    Previous result: Therapeutic last 2(+) visits    Additional findings: None       PLAN     Recommended plan for ongoing change(s) affecting result    Dosing Instructions: Continue your current warfarin dose 12.5 mg with next Chromogenic Factor X in 2 weeks       Telephone call with Tone who verbalizes understanding and agrees to plan and who agrees to plan and repeated back plan correctly    Lab visit scheduled    Education provided: Please call back if any changes to your diet, medications or how you've been taking warfarin    Plan made per [facility] anticoagulation protocol

## 2022-06-01 LAB — C TRACH DNA SPEC QL NAA+PROBE: NEGATIVE

## 2022-06-07 DIAGNOSIS — R79.89 LOW SERUM TESTOSTERONE: ICD-10-CM

## 2022-06-08 DIAGNOSIS — R79.89 LOW SERUM TESTOSTERONE: ICD-10-CM

## 2022-06-09 RX ORDER — NEEDLES, DISPOSABLE 25GX5/8"
NEEDLE, DISPOSABLE MISCELLANEOUS
Qty: 6 EACH | Refills: 1 | Status: SHIPPED | OUTPATIENT
Start: 2022-06-09 | End: 2022-12-05

## 2022-06-09 RX ORDER — NEEDLES, DISPOSABLE 25GX5/8"
NEEDLE, DISPOSABLE MISCELLANEOUS
OUTPATIENT
Start: 2022-06-09

## 2022-06-09 NOTE — TELEPHONE ENCOUNTER
"Needle, Disp, (BD DISP NEEDLES) 20G X 1\" [facility]    11/17/2021  [facility] [place]     Starla Zuleta MD    Endocrinology, Diabetes, and Metabolism     Nv:11/7/22    "

## 2022-06-14 ENCOUNTER — LAB (OUTPATIENT)
Dept: LAB | Facility: CLINIC | Age: 32
End: 2022-06-14
Payer: COMMERCIAL

## 2022-06-14 DIAGNOSIS — D68.61 ANTIPHOSPHOLIPID ANTIBODY SYNDROME (H): ICD-10-CM

## 2022-06-14 DIAGNOSIS — Z79.01 CHRONIC ANTICOAGULATION: ICD-10-CM

## 2022-06-14 DIAGNOSIS — Z86.73 HISTORY OF CVA (CEREBROVASCULAR ACCIDENT): ICD-10-CM

## 2022-06-14 PROCEDURE — 36415 COLL VENOUS BLD VENIPUNCTURE: CPT

## 2022-06-14 PROCEDURE — 85260 CLOT FACTOR X STUART-POWER: CPT

## 2022-06-15 ENCOUNTER — DOCUMENTATION ONLY (OUTPATIENT)
Dept: ANTICOAGULATION | Facility: CLINIC | Age: 32
End: 2022-06-15
Payer: COMMERCIAL

## 2022-06-15 DIAGNOSIS — D68.61 ANTIPHOSPHOLIPID SYNDROME (H): ICD-10-CM

## 2022-06-15 DIAGNOSIS — Z86.73 HISTORY OF CVA (CEREBROVASCULAR ACCIDENT): ICD-10-CM

## 2022-06-15 DIAGNOSIS — D68.61 ANTIPHOSPHOLIPID ANTIBODY SYNDROME (H): Primary | ICD-10-CM

## 2022-06-15 DIAGNOSIS — Z79.01 CHRONIC ANTICOAGULATION: ICD-10-CM

## 2022-06-15 DIAGNOSIS — Z86.711 HISTORY OF PULMONARY EMBOLISM: ICD-10-CM

## 2022-06-15 LAB — FACT X ACT/NOR PPP CHRO: 30 % (ref 70–130)

## 2022-06-15 NOTE — PROGRESS NOTES
Anticoagulation Monitoring CHROMOGENIC FACTOR 10   Latest Ref Rng & Units 70 - 130 %   6/14/2022 30 (L)   5/31/2022 40 (L)   4/13/2022 4/12/2022 34 (L)   3/16/2022    3/15/2022 32 (L)   3/3/2022 43 (L)     ANTICOAGULATION MANAGEMENT     Tone Marley 32 year old male is on warfarin with therapeutic result. (Goal Chromogenic Factor X 40-20%)    Recent labs: (last 7 days)     06/14/22  0810   [id number] 30*       ASSESSMENT     Source(s): Chart Review and Patient/Caregiver Call       Warfarin doses taken: Warfarin taken as instructed    Diet: No new diet changes identified    New illness, injury, or hospitalization: No    Medication/supplement changes: None noted    Signs or symptoms of bleeding or clotting: No    Previous result: Therapeutic last 2(+) visits    Additional findings: None       PLAN     Recommended plan for no diet, medication or health factor changes affecting result    Dosing Instructions: Continue your current warfarin dose 12.5 mg daily with next Chromogenic Factor X in 4 weeks   (patient has been in range last 3 checks, avoiding July 4th holiday week)    Telephone call with Tone who verbalizes understanding and agrees to plan    Lab visit scheduled    Education provided: Please call back if any changes to your diet, medications or how you've been taking warfarin    Plan made per [facility] anticoagulation protocol

## 2022-06-29 DIAGNOSIS — Z86.711 HISTORY OF PULMONARY EMBOLISM: ICD-10-CM

## 2022-06-29 DIAGNOSIS — D68.61 ANTIPHOSPHOLIPID SYNDROME (H): Primary | ICD-10-CM

## 2022-07-13 ENCOUNTER — LAB (OUTPATIENT)
Dept: LAB | Facility: CLINIC | Age: 32
End: 2022-07-13
Payer: COMMERCIAL

## 2022-07-13 ENCOUNTER — ANTICOAGULATION THERAPY VISIT (OUTPATIENT)
Dept: ANTICOAGULATION | Facility: CLINIC | Age: 32
End: 2022-07-13

## 2022-07-13 DIAGNOSIS — D68.61 ANTIPHOSPHOLIPID SYNDROME (H): ICD-10-CM

## 2022-07-13 DIAGNOSIS — D68.61 ANTIPHOSPHOLIPID ANTIBODY SYNDROME (H): Primary | ICD-10-CM

## 2022-07-13 DIAGNOSIS — Z86.711 HISTORY OF PULMONARY EMBOLISM: ICD-10-CM

## 2022-07-13 DIAGNOSIS — Z79.01 CHRONIC ANTICOAGULATION: ICD-10-CM

## 2022-07-13 DIAGNOSIS — E29.1 HYPOGONADISM MALE: ICD-10-CM

## 2022-07-13 DIAGNOSIS — Z86.73 HISTORY OF CVA (CEREBROVASCULAR ACCIDENT): ICD-10-CM

## 2022-07-13 LAB
FACT X ACT/NOR PPP CHRO: 32 % (ref 70–130)
SHBG SERPL-SCNC: 15 NMOL/L (ref 11–80)

## 2022-07-13 PROCEDURE — 84270 ASSAY OF SEX HORMONE GLOBUL: CPT

## 2022-07-13 PROCEDURE — 85260 CLOT FACTOR X STUART-POWER: CPT

## 2022-07-13 PROCEDURE — 36415 COLL VENOUS BLD VENIPUNCTURE: CPT

## 2022-07-13 PROCEDURE — 84153 ASSAY OF PSA TOTAL: CPT

## 2022-07-13 PROCEDURE — 84403 ASSAY OF TOTAL TESTOSTERONE: CPT

## 2022-07-13 NOTE — PROGRESS NOTES
ANTICOAGULATION MANAGEMENT     Tone Marley 32 year old male is on warfarin with therapeutic result. (Goal Chromogenic Factor X 40-20%)    Recent labs: (last 7 days)     07/13/22  0806   [id number] 32*       ASSESSMENT     Source(s): Chart Review and Patient/Caregiver Call       Warfarin doses taken: Warfarin taken as instructed    Diet: No new diet changes identified    New illness, injury, or hospitalization: No    Medication/supplement changes: None noted    Signs or symptoms of bleeding or clotting: No    Previous result: Therapeutic last 2(+) visits    Additional findings: None       PLAN     Recommended plan for no diet, medication or health factor changes affecting result    Dosing Instructions: Continue your current warfarin dose 12.5mg daily with next Chromogenic Factor X in 5 weeks       Telephone call with Tone who verbalizes understanding and agrees to plan    Lab visit scheduled    Education provided: Goal range and significance of current result and Importance of therapeutic range    Plan made per [facility] anticoagulation protocol

## 2022-07-15 LAB — PSA SERPL-MCNC: 0.74 UG/L (ref 0–4)

## 2022-07-16 LAB
TESTOST FREE SERPL-MCNC: 14.52 NG/DL
TESTOST SERPL-MCNC: 485 NG/DL (ref 240–950)

## 2022-07-19 DIAGNOSIS — Z79.01 CHRONIC ANTICOAGULATION: ICD-10-CM

## 2022-07-19 RX ORDER — WARFARIN SODIUM 5 MG/1
TABLET ORAL
Qty: 60 TABLET | Refills: 1 | Status: SHIPPED | OUTPATIENT
Start: 2022-07-19 | End: 2023-02-22

## 2022-07-19 NOTE — TELEPHONE ENCOUNTER
ANTICOAGULATION MANAGEMENT:  Medication Refill    Anticoagulation Summary  As of 7/13/2022    Warfarin maintenance plan:  12.5 mg (5 mg x 0.5 and 10 mg x 1) every day   Next INR check:  8/17/2022   Target end date:  Indefinite    Indications    Antiphospholipid antibody syndrome (H) [D68.61]  Chronic anticoagulation [Z79.01]  Antiphospholipid syndrome (H) [D68.61]  History of pulmonary embolism [Z86.711]  History of CVA (cerebrovascular accident) [Z86.73]             Anticoagulation Care Providers     Provider Role Specialty Phone number    Yara Faust MD Referring Family Medicine [phone]          Visit with referring provider/group within last year: Yes    [facility] referral signed within last year: Yes    Tone meets all criteria for refill (current [facility] referral, office visit with referring provider/group in last year, lab monitoring up to date or not exceeding 2 weeks overdue). Rx instructions and quantity supplied updated to match patient's current dosing plan. Warfarin 90 day supply with 1 refill granted per [facility] protocol     Nenita Portillo RN  Anticoagulation Clinic

## 2022-08-17 ENCOUNTER — LAB (OUTPATIENT)
Dept: LAB | Facility: CLINIC | Age: 32
End: 2022-08-17
Payer: COMMERCIAL

## 2022-08-17 ENCOUNTER — ANTICOAGULATION THERAPY VISIT (OUTPATIENT)
Dept: ANTICOAGULATION | Facility: CLINIC | Age: 32
End: 2022-08-17

## 2022-08-17 DIAGNOSIS — D68.61 ANTIPHOSPHOLIPID ANTIBODY SYNDROME (H): Primary | ICD-10-CM

## 2022-08-17 DIAGNOSIS — D68.61 ANTIPHOSPHOLIPID SYNDROME (H): ICD-10-CM

## 2022-08-17 DIAGNOSIS — Z86.711 HISTORY OF PULMONARY EMBOLISM: ICD-10-CM

## 2022-08-17 DIAGNOSIS — Z79.01 CHRONIC ANTICOAGULATION: ICD-10-CM

## 2022-08-17 DIAGNOSIS — Z86.73 HISTORY OF CVA (CEREBROVASCULAR ACCIDENT): ICD-10-CM

## 2022-08-17 LAB — FACT X ACT/NOR PPP CHRO: 34 % (ref 70–130)

## 2022-08-17 PROCEDURE — 85260 CLOT FACTOR X STUART-POWER: CPT

## 2022-08-17 PROCEDURE — 36415 COLL VENOUS BLD VENIPUNCTURE: CPT

## 2022-08-17 NOTE — PROGRESS NOTES
ANTICOAGULATION MANAGEMENT     Shahram Sitzjere 32 year old male is on warfarin with therapeutic result. (Goal Chromogenic Factor X 40-20%)    Anticoagulation Monitoring CHROMOGENIC FACTOR 10   Latest Ref Rng & Units 70 - 130 %   8/17/2022 34 (L)   7/13/2022 32 (L)   6/15/2022    6/14/2022 30 (L)   5/31/2022 40 (L)   4/13/2022 4/12/2022 34 (L)       ASSESSMENT     Source(s): Chart Review and Patient/Caregiver Call       Warfarin doses taken: Warfarin taken as instructed    Diet: No new diet changes identified    New illness, injury, or hospitalization: No    Medication/supplement changes: None noted    Signs or symptoms of bleeding or clotting: No    Previous result: Therapeutic last 2(+) visits    Additional findings: None       PLAN     Recommended plan for no diet, medication or health factor changes affecting result    Dosing Instructions: Continue your current warfarin dose 12.5mg daily with next Chromogenic Factor X in 6 weeks       Telephone call with Tone who verbalizes understanding and agrees to plan    Lab visit scheduled    Education provided: Goal range and significance of current result    Plan made per [facility] anticoagulation protocol

## 2022-08-22 NOTE — TELEPHONE ENCOUNTER
RECORDS RECEIVED FROM: Testosterone follow-up   DATE RECEIVED: 12/5/2022   NOTES (FOR ALL VISITS) STATUS DETAILS   OFFICE NOTES from referring provider N/A    [facility]  [facility] - [facility] OV with Dr. Faust    [facility]:  11/17/21, 2/8/21 - ENDO OV with Dr. Zuleta   ED NOTES N/A    OPERATIVE REPORT  (thyroid, pituitary, adrenal, parathyroid) N/A    MEDICATION LIST Internal    IMAGING      MRI (BRAIN) Internal [facility]:  1/9/20 - MRI Pituitary   LABS     DIABETES: HBGA1C, CREATININE, FASTING LIPIDS, MICROALBUMIN URINE, POTASSIUM, TSH, T4    THYROID: TSH, T4, CBC, THYRODLONULIN, TOTAL T3, FREE T4, CALCITONIN, CEA Care Everywhere / Internal   [facility]:  7/13/22 - Testosterone  5/31/22 - Glucose  5/31/22 - HBGA1C  1/5/22 - CMP  1/5/22 - Lipid  9/22/20 - BMP  2/10/20 - CBC  10/30/19 - TSH    [facility]:  12/6/18 - Creatinine

## 2022-08-31 DIAGNOSIS — Z79.01 CHRONIC ANTICOAGULATION: ICD-10-CM

## 2022-08-31 DIAGNOSIS — Z86.711 HISTORY OF PULMONARY EMBOLISM: ICD-10-CM

## 2022-08-31 DIAGNOSIS — R79.89 LOW SERUM TESTOSTERONE: ICD-10-CM

## 2022-08-31 DIAGNOSIS — Z86.73 HISTORY OF CVA (CEREBROVASCULAR ACCIDENT): ICD-10-CM

## 2022-08-31 DIAGNOSIS — D68.61 ANTIPHOSPHOLIPID ANTIBODY SYNDROME (H): ICD-10-CM

## 2022-08-31 DIAGNOSIS — E29.1 HYPOGONADISM MALE: ICD-10-CM

## 2022-08-31 NOTE — TELEPHONE ENCOUNTER
testosterone cypionate (DEPOTESTOSTERONE) 100 MG/ML injection 10 mL 1 2/16/2022         Last Written Prescription Date:  2-  Last Fill Quantity: 10ML,   # refills: 1  Last Office Visit : 11-  Future Office visit:  12-5-2022    Routing refill request to provider for review/approval because:  CONTROLLED MEDICATION      Kathleen M Doege RN

## 2022-09-06 RX ORDER — TESTOSTERONE CYPIONATE 1000 MG/10ML
150 INJECTION, SOLUTION INTRAMUSCULAR
Qty: 10 ML | Refills: 0 | Status: SHIPPED | OUTPATIENT
Start: 2022-09-06 | End: 2022-09-15

## 2022-09-06 NOTE — TELEPHONE ENCOUNTER
Refilled x1.  Reestablishing care in Dec with new provider and can decide at that point if risk/benefit indicates continued testosterone therapy

## 2022-09-15 ENCOUNTER — TELEPHONE (OUTPATIENT)
Dept: ENDOCRINOLOGY | Facility: CLINIC | Age: 32
End: 2022-09-15

## 2022-09-15 DIAGNOSIS — E29.1 HYPOGONADISM MALE: ICD-10-CM

## 2022-09-15 DIAGNOSIS — D68.61 ANTIPHOSPHOLIPID ANTIBODY SYNDROME (H): ICD-10-CM

## 2022-09-15 DIAGNOSIS — Z86.73 HISTORY OF CVA (CEREBROVASCULAR ACCIDENT): ICD-10-CM

## 2022-09-15 DIAGNOSIS — Z79.01 CHRONIC ANTICOAGULATION: ICD-10-CM

## 2022-09-15 DIAGNOSIS — R79.89 LOW SERUM TESTOSTERONE: ICD-10-CM

## 2022-09-15 DIAGNOSIS — Z86.711 HISTORY OF PULMONARY EMBOLISM: ICD-10-CM

## 2022-09-15 RX ORDER — TESTOSTERONE CYPIONATE 1000 MG/10ML
150 INJECTION, SOLUTION INTRAMUSCULAR
Qty: 10 ML | Refills: 1 | Status: SHIPPED | OUTPATIENT
Start: 2022-09-15 | End: 2022-12-05

## 2022-09-15 NOTE — TELEPHONE ENCOUNTER
Refiils routed to Dr Roche. Last saw Dr Zuleta and will see Dr Contreras. Rachel Leach RN on 9/15/2022 at 4:28 PM

## 2022-09-15 NOTE — TELEPHONE ENCOUNTER
[facility] Call Center    Phone Message    May a detailed message be left on voicemail: yes     Reason for Call: Medication Refill Request    Has the patient contacted the pharmacy for the refill? Yes   Name of medication being requested: Current   Pharmacy is out of this RX   Please send orders to  [facility] for  asap. Thank you .      testosterone cypionate (DEPOTESTOSTERONE) 100 MG/ML injectio      Provider who prescribed the medication: Dr. Wood     Pharmacy: [id number] - [place], [place] - [address] AT [facility] [address] [facility]    Date medication is needed: asap          Action Taken: Other: ENDO    Travel Screening: Not Applicable

## 2022-09-18 DIAGNOSIS — Z86.73 HISTORY OF CVA (CEREBROVASCULAR ACCIDENT): ICD-10-CM

## 2022-09-18 DIAGNOSIS — Z79.01 CHRONIC ANTICOAGULATION: ICD-10-CM

## 2022-09-18 DIAGNOSIS — Z86.711 HISTORY OF PULMONARY EMBOLISM: ICD-10-CM

## 2022-09-18 DIAGNOSIS — D68.61 ANTIPHOSPHOLIPID ANTIBODY SYNDROME (H): ICD-10-CM

## 2022-09-19 RX ORDER — WARFARIN SODIUM 10 MG/1
TABLET ORAL
Qty: 90 TABLET | Refills: 1 | Status: SHIPPED | OUTPATIENT
Start: 2022-09-19 | End: 2023-01-17

## 2022-09-19 NOTE — TELEPHONE ENCOUNTER
ANTICOAGULATION MANAGEMENT:  Medication Refill    Anticoagulation Summary  As of 8/17/2022    Warfarin maintenance plan:  12.5 mg (5 mg x 0.5 and 10 mg x 1) every day   Next INR check:  9/28/2022   Target end date:  Indefinite    Indications    Antiphospholipid antibody syndrome (H) [D68.61]  Chronic anticoagulation [Z79.01]  Antiphospholipid syndrome (H) [D68.61]  History of pulmonary embolism [Z86.711]  History of CVA (cerebrovascular accident) [Z86.73]             Anticoagulation Care Providers     Provider Role Specialty Phone number    Yara Faust MD Referring Family Medicine [phone]          Visit with referring provider/group within last year: Yes    [facility] referral signed within last year: Yes    Tone meets all criteria for refill (current [facility] referral, office visit with referring provider/group in last year, lab monitoring up to date or not exceeding 2 weeks overdue). Rx instructions and quantity supplied updated to match patient's current dosing plan. Warfarin 90 day supply with 1 refill granted per [facility] protocol     Nenita Portillo RN  Anticoagulation Clinic

## 2022-09-28 ENCOUNTER — TELEPHONE (OUTPATIENT)
Dept: FAMILY MEDICINE | Facility: CLINIC | Age: 32
End: 2022-09-28

## 2022-09-28 ENCOUNTER — ANTICOAGULATION THERAPY VISIT (OUTPATIENT)
Dept: ANTICOAGULATION | Facility: CLINIC | Age: 32
End: 2022-09-28

## 2022-09-28 ENCOUNTER — LAB (OUTPATIENT)
Dept: LAB | Facility: CLINIC | Age: 32
End: 2022-09-28
Payer: COMMERCIAL

## 2022-09-28 DIAGNOSIS — Z79.01 CHRONIC ANTICOAGULATION: ICD-10-CM

## 2022-09-28 DIAGNOSIS — Z86.711 HISTORY OF PULMONARY EMBOLISM: ICD-10-CM

## 2022-09-28 DIAGNOSIS — Z86.711 HISTORY OF PULMONARY EMBOLISM: Primary | ICD-10-CM

## 2022-09-28 DIAGNOSIS — D68.61 ANTIPHOSPHOLIPID SYNDROME (H): ICD-10-CM

## 2022-09-28 DIAGNOSIS — D68.61 ANTIPHOSPHOLIPID ANTIBODY SYNDROME (H): ICD-10-CM

## 2022-09-28 LAB — FACT X ACT/NOR PPP CHRO: 43 % (ref 70–130)

## 2022-09-28 PROCEDURE — 36415 COLL VENOUS BLD VENIPUNCTURE: CPT

## 2022-09-28 PROCEDURE — 85260 CLOT FACTOR X STUART-POWER: CPT

## 2022-09-28 NOTE — PROGRESS NOTES
ANTICOAGULATION MANAGEMENT     Tone Marley 32 year old male is on warfarin with subtherapeutic result. (Goal Chromogenic Factor X 40-20%)    Recent labs: (last 7 days)     09/28/22  0829   [id number]*       ASSESSMENT     Source(s): Chart Review and Patient/Caregiver Call       Warfarin doses taken: Warfarin taken as instructed    Diet: protein supplement powder w/ vitamin K three days a week    New illness, injury, or hospitalization: No    Medication/supplement changes: None noted    Signs or symptoms of bleeding or clotting: No    Previous result: Therapeutic last 2(+) visits    Additional findings: Maintenance dose increased due to new usage of protein powder with vitamin K.       PLAN     Recommended plan for ongoing change(s) affecting result    Dosing Instructions:  Increase maintenance dose to 15 mg on Wednesdays and Saturdays and 12.5 mg all other days (5.7%) change with next Chromogenic Factor X in 2 weeks       Telephone call with Tone who agrees to plan and repeated back plan correctly    Lab visit scheduled    Education provided: Importance of notifying clinic for changes in medications; a sooner lab recheck maybe needed. and Contact [phone]  with any changes, questions or concerns.     Plan made per [facility] anticoagulation protocol

## 2022-09-28 NOTE — TELEPHONE ENCOUNTER
ANTICOAGULATION CLINIC REFERRAL RENEWAL REQUEST       An annual renewal order is required for all patients referred to [facility].?  Please review and sign the pended referral order for Tone Marley.       ANTICOAGULATION SUMMARY      Warfarin indication(s)   Antiphospholipid antibody syndrome (H) [D68.61]  Chronic anticoagulation [Z79.01]  Antiphospholipid syndrome (H) [D68.61]  History of pulmonary embolism [Z86.711]  History of CVA (cerebrovascular accident) [Z86.73]           Current goal range   Chromogenic Factor X:  40-20%     Goal appropriate for indication? Chromogenic Factor X of 40-20% appropriate for indication(s) above     Time in Therapeutic Range (TTR)  (Goal > 60%)        Office visit with referring provider's group within last year yes on 1/11/22       Jeanette Sales RN  [facility]

## 2022-10-24 ENCOUNTER — ANTICOAGULATION THERAPY VISIT (OUTPATIENT)
Dept: ANTICOAGULATION | Facility: CLINIC | Age: 32
End: 2022-10-24

## 2022-10-24 ENCOUNTER — LAB (OUTPATIENT)
Dept: LAB | Facility: CLINIC | Age: 32
End: 2022-10-24
Payer: COMMERCIAL

## 2022-10-24 DIAGNOSIS — Z86.711 HISTORY OF PULMONARY EMBOLISM: ICD-10-CM

## 2022-10-24 DIAGNOSIS — Z79.01 CHRONIC ANTICOAGULATION: ICD-10-CM

## 2022-10-24 DIAGNOSIS — D68.61 ANTIPHOSPHOLIPID SYNDROME (H): ICD-10-CM

## 2022-10-24 DIAGNOSIS — D68.61 ANTIPHOSPHOLIPID ANTIBODY SYNDROME (H): ICD-10-CM

## 2022-10-24 DIAGNOSIS — Z86.73 HISTORY OF CVA (CEREBROVASCULAR ACCIDENT): ICD-10-CM

## 2022-10-24 DIAGNOSIS — D68.61 ANTIPHOSPHOLIPID ANTIBODY SYNDROME (H): Primary | ICD-10-CM

## 2022-10-24 LAB — FACT X ACT/NOR PPP CHRO: 29 % (ref 70–130)

## 2022-10-24 PROCEDURE — 36415 COLL VENOUS BLD VENIPUNCTURE: CPT

## 2022-10-24 PROCEDURE — 85260 CLOT FACTOR X STUART-POWER: CPT

## 2022-10-24 NOTE — PROGRESS NOTES
ANTICOAGULATION MANAGEMENT     Tone Marley 32 year old male is on warfarin with therapeutic result. (Goal Chromogenic Factor X 40-20%)    Anticoagulation Monitoring [id number]   Latest Ref Rng & Units 70 - 130 %   10/24/2022 29 (L)   9/28/2022 43 (L)   9/28/2022 8/17/2022 34 (L)   7/13/2022 32 (L)       ASSESSMENT     Source(s): Chart Review and Patient/Caregiver Call       Warfarin doses taken: Warfarin taken as instructed    Diet: Protein powder containing vit K three times/week. This is new normal.    New illness, injury, or hospitalization: No    Medication/supplement changes: None noted    Signs or symptoms of bleeding or clotting: No    Previous result: Subtherapeutic    Additional findings: None       PLAN     Recommended plan for no diet, medication or health factor changes affecting result    Dosing Instructions: Continue your current warfarin dose 15mg Wed, Sat; 12.5 mg all other days with next Chromogenic Factor X in 4 weeks       Telephone call with Tone who verbalizes understanding and agrees to plan    Lab visit scheduled    Education provided:     Goal range and lab monitoring: goal range and significance of current result    Plan made per [facility] anticoagulation protocol

## 2022-11-23 ENCOUNTER — LAB (OUTPATIENT)
Dept: LAB | Facility: CLINIC | Age: 32
End: 2022-11-23
Payer: COMMERCIAL

## 2022-11-23 DIAGNOSIS — Z86.711 HISTORY OF PULMONARY EMBOLISM: ICD-10-CM

## 2022-11-23 DIAGNOSIS — D68.61 ANTIPHOSPHOLIPID ANTIBODY SYNDROME (H): ICD-10-CM

## 2022-11-23 DIAGNOSIS — D68.61 ANTIPHOSPHOLIPID SYNDROME (H): ICD-10-CM

## 2022-11-23 DIAGNOSIS — Z79.01 CHRONIC ANTICOAGULATION: ICD-10-CM

## 2022-11-23 PROCEDURE — 36415 COLL VENOUS BLD VENIPUNCTURE: CPT

## 2022-11-23 PROCEDURE — 85260 CLOT FACTOR X STUART-POWER: CPT

## 2022-11-24 LAB — FACT X ACT/NOR PPP CHRO: 30 % (ref 70–130)

## 2022-11-25 ENCOUNTER — ANTICOAGULATION THERAPY VISIT (OUTPATIENT)
Dept: ANTICOAGULATION | Facility: CLINIC | Age: 32
End: 2022-11-25

## 2022-11-25 DIAGNOSIS — Z86.711 HISTORY OF PULMONARY EMBOLISM: ICD-10-CM

## 2022-11-25 DIAGNOSIS — Z79.01 CHRONIC ANTICOAGULATION: ICD-10-CM

## 2022-11-25 DIAGNOSIS — Z86.73 HISTORY OF CVA (CEREBROVASCULAR ACCIDENT): ICD-10-CM

## 2022-11-25 DIAGNOSIS — D68.61 ANTIPHOSPHOLIPID SYNDROME (H): ICD-10-CM

## 2022-11-25 DIAGNOSIS — D68.61 ANTIPHOSPHOLIPID ANTIBODY SYNDROME (H): Primary | ICD-10-CM

## 2022-11-25 NOTE — PROGRESS NOTES
ANTICOAGULATION MANAGEMENT     Shaharm Sitzman 32 year old male is on warfarin with therapeutic result. (Goal Chromogenic Factor X 40-20%)    Recent labs: (last 7 days)     11/23/22  0819   [id number] 30*       ASSESSMENT     Source(s): Chart Review and Patient/Caregiver Call       Warfarin doses taken: Warfarin taken as instructed    Diet: No new diet changes identified    New illness, injury, or hospitalization: No    Medication/supplement changes: None noted    Signs or symptoms of bleeding or clotting: No    Previous result: Therapeutic last visit; previously outside of goal range    Additional findings: None       PLAN     Recommended plan for no diet, medication or health factor changes affecting result    Dosing Instructions: Continue your current warfarin dose 15 mg every Wed/Sat and 12.5 mg all other days with next Chromogenic Factor X in 4 weeks (previous was 4 weeks)     Telephone call with Tone who verbalizes understanding and agrees to plan    Lab visit scheduled    Education provided:     Contact [phone]  with any changes, questions or concerns.     Plan made per [facility] anticoagulation protocol

## 2022-12-03 ENCOUNTER — HOSPITAL ENCOUNTER (EMERGENCY)
Facility: CLINIC | Age: 32
Discharge: HOME | End: 2022-12-04
Attending: EMERGENCY MEDICINE | Admitting: EMERGENCY MEDICINE
Payer: COMMERCIAL

## 2022-12-03 VITALS
SYSTOLIC BLOOD PRESSURE: 173 MMHG | DIASTOLIC BLOOD PRESSURE: 85 MMHG | HEIGHT: 72 IN | OXYGEN SATURATION: 98 % | WEIGHT: 240 LBS | RESPIRATION RATE: 20 BRPM | BODY MASS INDEX: 32.51 KG/M2 | TEMPERATURE: 98.9 F | HEART RATE: 75 BPM

## 2022-12-03 DIAGNOSIS — S61.211A LACERATION OF LEFT INDEX FINGER WITHOUT FOREIGN BODY WITHOUT DAMAGE TO NAIL, INITIAL ENCOUNTER: ICD-10-CM

## 2022-12-03 PROCEDURE — 99283 EMERGENCY DEPT VISIT LOW MDM: CPT

## 2022-12-03 PROCEDURE — 12001 RPR S/N/AX/GEN/TRNK 2.5CM/<: CPT

## 2022-12-03 ASSESSMENT — ENCOUNTER SYMPTOMS: WOUND: 1

## 2022-12-04 NOTE — ED TRIAGE NOTES
Patient presents with laceration to left index finger that wont stop bleeding.  He was cutting bread with kitchen knife and accidentally sliced his finger.  He takes Warfarin and Aspirin daily for antiphospholipid antibody syndrome.  In triage, wound is slowly oozing blood.  Dressing applied.     Triage Assessment     Row Name 12/03/22 2608       Triage Assessment (Adult)    Airway WDL WDL       Respiratory WDL    Respiratory WDL WDL       Skin Circulation/Temperature WDL    Skin Circulation/Temperature WDL WDL       Cardiac WDL    Cardiac WDL WDL       Peripheral/Neurovascular WDL    Peripheral Neurovascular WDL WDL       Cognitive/Neuro/Behavioral WDL    Cognitive/Neuro/Behavioral WDL WDL

## 2022-12-04 NOTE — ED PROVIDER NOTES
History   Chief Complaint:  Laceration     The history is provided by the patient and medical records.      Tone Marley is a 32 year old male who is anticoagulated on warfarin with history of antiphospholipid antibody syndrome, PE/DVT, cerebrovascular accident, and hypertension who presents with a finger laceration. The patient states he accidentally cut his left first finger while cutting bread at approximately 2150 today. Due to continued uncontrolled bleeding, the patient presented to the emergency department. He has no other injuries. He is currently taking warfarin and aspirin for antiphospholipid antibody syndrome. Last known tetanus was in 2012, according to the [facility].     Review of Systems   Skin: Positive for wound.   All other systems reviewed and are negative.    Allergies:  The patient has no known allergies.     Medications:  Aspirin   Lipitor  Hydrodiuril   Cozaar  Depotestosterone   Coumadin     Past Medical History:     Antiphospholipid syndrome  Hyperlipidemia  Hypertension  ADHD  Pulmonary embolism  Deep venous thrombosis   Class 2 obesity   Cerebrovascular accident   Low serum testosterone      Past Surgical History:    [place] teeth extraction      Family History:    Mother: Asthma, Depression  Father: Heart Disease     Social History:  The patient was unaccompanied to the emergency department.  PCP: Yara Faust     Physical Exam     Patient Vitals for the past 24 hrs:   BP Temp Temp src Pulse Resp SpO2 Height Weight   12/03/22 2244 (!) 173/85 -- -- -- -- -- -- --   12/03/22 2242 -- 98.9  F (37.2  C) Temporal 75 20 98 % 1.829 m (6') 108.9 kg (240 lb)     Physical Exam  General:  Alert, nontoxic in appearance  CV:  Appears well perfused  Lungs:  No obvious respiratory distress  Neuro:  Speaking clearly, no slurred speech  MSK:  Skin avulsion to lateral aspect of distal left index finger.  No nail involvement.        Emergency Department Course     Procedures    Laceration Repair       Procedure: Laceration Repair    Indication: Laceration    Consent: Verbal    Location: Left L second (index) finger    Length: 1.0 cm    Preparation: Irrigation with Tap Water and and soap.    Anesthesia/Sedation: None      Treatment/Exploration: Wound explored, no foreign bodies found     Closure: The wound was closed with Tissue Adhesive.    Patient Status: The patient tolerated the procedure well: Yes. There were no complications.    Emergency Department Course:     Reviewed:  I reviewed nursing notes, vitals, past medical history and Care Everywhere    Assessments:  2335 I obtained history and examined the patient as noted above.   2342 I performed a laceration repair, procedure noted above.     Disposition:  The patient was discharged to home.     Impression & Plan   Medical Decision Making:  Tone Marley is a 32-year-old gentleman who presents due to bleeding from his left index finger after accidentally cutting it with a knife.  On my evaluation he did have a laceration with loss of tissue to the lateral aspect of the left index finger.  There is no involvement of the nailbed.  I then placed a finger tourniquet to stop the bleeding and tissue adhesive was applied with good effect.  Patient is on Coumadin.  Patient was discharged with recommendation for supportive care.    Diagnosis:    ICD-10-CM    1. Laceration of left index finger without foreign body without damage to nail, initial encounter  S61.211A         Scribe Disclosure:  I, Kendra Kuhn, am serving as a scribe at 11:34 PM on 12/3/2022 to document services personally performed by Jasvir Klein MD based on my observations and the provider's statements to me.     Jasvir Klein MD  12/06/22 1222

## 2022-12-05 ENCOUNTER — PRE VISIT (OUTPATIENT)
Dept: ENDOCRINOLOGY | Facility: CLINIC | Age: 32
End: 2022-12-05

## 2022-12-05 ENCOUNTER — OFFICE VISIT (OUTPATIENT)
Dept: ENDOCRINOLOGY | Facility: CLINIC | Age: 32
End: 2022-12-05
Payer: COMMERCIAL

## 2022-12-05 ENCOUNTER — TELEPHONE (OUTPATIENT)
Dept: FAMILY MEDICINE | Facility: CLINIC | Age: 32
End: 2022-12-05

## 2022-12-05 VITALS
DIASTOLIC BLOOD PRESSURE: 76 MMHG | SYSTOLIC BLOOD PRESSURE: 135 MMHG | HEART RATE: 61 BPM | BODY MASS INDEX: 33.77 KG/M2 | WEIGHT: 249 LBS

## 2022-12-05 DIAGNOSIS — Z86.73 HISTORY OF CVA (CEREBROVASCULAR ACCIDENT): ICD-10-CM

## 2022-12-05 DIAGNOSIS — Z79.01 CHRONIC ANTICOAGULATION: ICD-10-CM

## 2022-12-05 DIAGNOSIS — D68.61 ANTIPHOSPHOLIPID ANTIBODY SYNDROME (H): ICD-10-CM

## 2022-12-05 DIAGNOSIS — Z86.711 HISTORY OF PULMONARY EMBOLISM: ICD-10-CM

## 2022-12-05 DIAGNOSIS — R79.89 LOW SERUM TESTOSTERONE: ICD-10-CM

## 2022-12-05 DIAGNOSIS — E22.1 HYPERPROLACTINEMIA (H): Primary | ICD-10-CM

## 2022-12-05 DIAGNOSIS — E29.1 HYPOGONADISM MALE: ICD-10-CM

## 2022-12-05 PROCEDURE — 99215 OFFICE O/P EST HI 40 MIN: CPT | Performed by: INTERNAL MEDICINE

## 2022-12-05 RX ORDER — LOSARTAN POTASSIUM 25 MG/1
50 TABLET ORAL DAILY
COMMUNITY
End: 2024-04-23

## 2022-12-05 RX ORDER — TESTOSTERONE CYPIONATE 1000 MG/10ML
150 INJECTION, SOLUTION INTRAMUSCULAR
Qty: 10 ML | Refills: 5 | Status: SHIPPED | OUTPATIENT
Start: 2022-12-05 | End: 2023-02-01 | Stop reason: ALTCHOICE

## 2022-12-05 RX ORDER — SYRINGE WITH NEEDLE, 1 ML 25GX5/8"
SYRINGE, EMPTY DISPOSABLE MISCELLANEOUS
COMMUNITY
Start: 2022-06-07 | End: 2023-02-22

## 2022-12-05 RX ORDER — NEEDLES, DISPOSABLE 25GX5/8"
NEEDLE, DISPOSABLE MISCELLANEOUS
Qty: 50 EACH | Refills: 3 | Status: SHIPPED | OUTPATIENT
Start: 2022-12-05 | End: 2023-02-22

## 2022-12-05 ASSESSMENT — PAIN SCALES - GENERAL: PAINLEVEL: NO PAIN (0)

## 2022-12-05 NOTE — TELEPHONE ENCOUNTER
ANTICOAGULATION  MANAGEMENT: Discharge Review    Tone Marley chart reviewed for anticoagulation continuity of care    Emergency room visit on 12/3/22 for laceration of left first finger.    Discharge disposition: Home    Results:    No results for input(s): INR, [id number], F2, ALMWH, AAUFH in the last 168 hours.    Anticoagulation inpatient management:     not applicable     Anticoagulation discharge instructions:     Warfarin dosing: home regimen continued   Bridging: No   INR goal change: No      Medication changes affecting anticoagulation: No    Additional factors affecting anticoagulation: No     PLAN     No adjustment to anticoagulation plan needed    Patient contacted via [employer]    No adjustment to Anticoagulation Calendar was required    Beatriz Martin RN

## 2022-12-05 NOTE — LETTER
12/5/2022       RE: Tone Marley  [address]  [place] [address]     Dear Colleague,    Thank you for referring your patient, Tone Marley, to the [facility] [place] at [facility]. Please see a copy of my visit note below.    Endocrine Consult note    Attending Assessment/Plan :        Low serum testosterone  Hypogonadism male  History of CVA (cerebrovascular accident)  Antiphospholipid antibody syndrome (H)  History of pulmonary embolism  Chronic anticoagulation  Hyperprolactinemia (H)  -check testosterone, CBC, gonadotropins midway through injection cycle  -recheck prolactin as this has been elevated in the past  -until we get labs back, continue testosterone 150 q2 weeks injection  -can consider going to topical therapy depending on labs    RTC 5 months    I have independently reviewed and interpreted labs, imaging as indicated.      Chief complaint:  Tone is a 32 year old male seen in consultation for hypogonadism.       HISTORY OF PRESENT ILLNESS    Tone is a 32M who comes to clinic for management of hypogonadism.     He is currently on 150mg testosterone cyp q14 days.  He injects this on Tuesdays.     He also has a hx of antiphospholipid syndrome and is on anticoagulation for this. This was diagnosed at age 13 after he had a DVT in the lower extremity and PE.  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      Prior to therapy with testosterone he was having low libido, low energy, and difficulty losing weight.     Discussed potentially switching to trial of topical testosterone to eliminate injection burden and give more physiologic consistent testosterone levels.       Endocrine relevant labs and/or imaging are as follows:  Pituitary MRI in 2020 showed no pituitary lesions.  "    REVIEW OF SYSTEMS  Answers for HPI/ROS submitted by the patient on 11/28/2022  General Symptoms: No  Skin Symptoms: No  HENT Symptoms: No  EYE SYMPTOMS: No  HEART SYMPTOMS: No  LUNG SYMPTOMS: No  INTESTINAL SYMPTOMS: No  URINARY SYMPTOMS: No  REPRODUCTIVE SYMPTOMS: No  SKELETAL SYMPTOMS: No  BLOOD SYMPTOMS: No  NERVOUS SYSTEM SYMPTOMS: No  MENTAL HEALTH SYMPTOMS: No      10 system ROS otherwise as per the HPI or negative    Past Medical History  Past Medical History:   Diagnosis Date     ADHD (attention deficit hyperactivity disorder) 6/27/2012     Antiphospholipid antibody syndromes     at age 13     Antiphospholipid syndrome (H) 2/2/2012    On anticoagulation coumdin 10 mg once daily       Cerebral infarction (H) 7/1/18    Vertebral artery dissection     History of pulmonary embolism 9/2/2013    Was diagnosed  Positive CT scan in emergency department   now Under care of Dr Turcios at [facility],[phone] His nurse personal line [phone] Was on lovenox for 5 months  Now  For past one month on xareltlo 20 mg once daily and plaqunil twice daily       Hyperlipidemia LDL goal <130 6/11/2013     Hypertension goal BP (blood pressure) < 140/90 6/19/12     Hypertension, uncontrolled 6/27/2012     Postphlebitic syndrome 12/9/2014     Prehypertension 2/2/2012     Pulmonary embolism (H)     at age 13     Pulmonary embolism (H) 1/11/2015       Medications  Current Outpatient Medications   Medication Sig Dispense Refill     ASPIRIN PO Take 81 mg by mouth daily       atorvastatin (LIPITOR) 10 MG tablet Take 1 tablet (10 mg) by mouth daily 90 tablet 3     B-D LUER-RAMIRO SYRINGE 25G X 1\" 3 ML MISC USE FOR TESTOSTERONE INJECTION EVERY 14 DAYS       Blood Collection Needle 20G X 1\" MISC Us to inject testosterone IM 6 each 3     hydrochlorothiazide (HYDRODIURIL) 25 MG tablet Take 1 tablet (25 mg) by mouth daily 90 tablet 3     losartan (COZAAR) 100 MG tablet Take 1 tablet (100 mg) by mouth daily 90 tablet 3     " "losartan (COZAAR) 25 MG tablet Take 50 mg by mouth daily       Needle, Disp, (BD DISP NEEDLES) 20G X 1\" MISC USE FOR TESTOSTERONE IN THE MUSCLE INJECTION 6 each 1     Omega-3 Fatty Acids (FISH OIL PO)        Syringe/Needle, Disp, 20G X 1-1/2\" 3 ML MISC Use for IM testosterone injection 6 each 1     Syringe/Needle, Disp, 20G X 1-1/2\" 3 ML MISC Use to draw up testosterone for IM injection 6 each 3     syringe/needle, disp, 23G X 1\" 3 ML MISC Use for  Testosterone injection every 14 days LUER  LOCK 6 each 3     testosterone cypionate (DEPOTESTOSTERONE) 100 MG/ML injection Inject 1.5 mLs (150 mg) into the muscle every 14 days 10 mL 1     warfarin ANTICOAGULANT (COUMADIN) 10 MG tablet Take 12.5 mg (5 mg x 0.5 tablet and 10 mg tablet) daily by mouth. Adjust dose based on INR results as directed. 90 tablet 1     warfarin ANTICOAGULANT (COUMADIN) 5 MG tablet Take 1/2 tablet by mouth daily along with 10 mg tablet for a total of 12.5 mg daily. Adjust dose based on INR results as directed. 60 tablet 1       Allergies  Allergies   Allergen Reactions     Nkda [No Known Drug Allergies]          Family History  family history includes Asthma in his mother; C.A.D. (age of onset: 42) in his paternal uncle; Depression in his mother; Heart Surgery (age of onset: 60) in his father; Other Cancer in his maternal grandmother.    Social History  Social History     Tobacco Use     Smoking status: Never     Smokeless tobacco: Former     Types: Chew     Quit date: 07/2018     Tobacco comments:     Chew maybe once per week.   Substance Use Topics     Alcohol use: Yes     Alcohol/week: 0.0 standard drinks     Comment:  quit post stroke 7-2018     Drug use: No       Physical Exam  /76 (BP Location: Right arm, Patient Position: Sitting, Cuff Size: Adult Regular)   Pulse 61   Wt 112.9 kg (249 lb)   BMI 33.77 kg/m    Body mass index is 33.77 kg/m .    Physical Exam    GENERAL :  In no apparent distress  SKIN: Normal color, normal " temperature     EYES: EOMI, No scleral icterus  NECK: No visible masses  RESP: No respiratory distress, normal effort  CARDIO: regular rate  ABDOMEN: flat, nondistended       NEURO: awake, alert, responds appropriately to questions   EXTREMITIES: No clubbing, cyanosis or edema.    I spent a total of 46 minutes on the day of this new patient visit on chart review, lab review, review of imaging, reviewing previous providers notes, exam and counseling of patient    Jonathon Contreras MD

## 2022-12-05 NOTE — PROGRESS NOTES
Endocrine Consult note    Attending Assessment/Plan :        Low serum testosterone  Hypogonadism male  History of CVA (cerebrovascular accident)  Antiphospholipid antibody syndrome (H)  History of pulmonary embolism  Chronic anticoagulation  Hyperprolactinemia (H)  -check testosterone, CBC, gonadotropins midway through injection cycle  -recheck prolactin as this has been elevated in the past  -until we get labs back, continue testosterone 150 q2 weeks injection  -can consider going to topical therapy depending on labs    RTC 5 months    I have independently reviewed and interpreted labs, imaging as indicated.      Chief complaint:  Tone is a 32 year old male seen in consultation for hypogonadism.       HISTORY OF PRESENT ILLNESS    Tone is a 32M who comes to clinic for management of hypogonadism.     He is currently on 150mg testosterone cyp q14 days.  He injects this on Tuesdays.     He also has a hx of antiphospholipid syndrome and is on anticoagulation for this. This was diagnosed at age 13 after he had a DVT in the lower extremity and PE.  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      Prior to therapy with testosterone he was having low libido, low energy, and difficulty losing weight.     Discussed potentially switching to trial of topical testosterone to eliminate injection burden and give more physiologic consistent testosterone levels.       Endocrine relevant labs and/or imaging are as follows:  Pituitary MRI in 2020 showed no pituitary lesions.     REVIEW OF SYSTEMS  Answers for HPI/ROS submitted by the patient on 11/28/2022  General Symptoms: No  Skin Symptoms: No  HENT Symptoms: No  EYE SYMPTOMS: No  HEART SYMPTOMS: No  LUNG SYMPTOMS: No  INTESTINAL SYMPTOMS: No  URINARY SYMPTOMS: No  REPRODUCTIVE SYMPTOMS: No  SKELETAL SYMPTOMS: No  BLOOD SYMPTOMS: No  NERVOUS SYSTEM  "SYMPTOMS: No  MENTAL HEALTH SYMPTOMS: No      10 system ROS otherwise as per the HPI or negative    Past Medical History  Past Medical History:   Diagnosis Date     ADHD (attention deficit hyperactivity disorder) 6/27/2012     Antiphospholipid antibody syndromes     at age 13     Antiphospholipid syndrome (H) 2/2/2012    On anticoagulation coumdin 10 mg once daily       Cerebral infarction (H) 7/1/18    Vertebral artery dissection     History of pulmonary embolism 9/2/2013    Was diagnosed  Positive CT scan in emergency department   now Under care of Dr Turcios at [facility],[phone] His nurse personal line [phone] Was on lovenox for 5 months  Now  For past one month on xareltlo 20 mg once daily and plaqunil twice daily       Hyperlipidemia LDL goal <130 6/11/2013     Hypertension goal BP (blood pressure) < 140/90 6/19/12     Hypertension, uncontrolled 6/27/2012     Postphlebitic syndrome 12/9/2014     Prehypertension 2/2/2012     Pulmonary embolism (H)     at age 13     Pulmonary embolism (H) 1/11/2015       Medications  Current Outpatient Medications   Medication Sig Dispense Refill     ASPIRIN PO Take 81 mg by mouth daily       atorvastatin (LIPITOR) 10 MG tablet Take 1 tablet (10 mg) by mouth daily 90 tablet 3     B-D LUER-RAMIRO SYRINGE 25G X 1\" 3 ML MISC USE FOR TESTOSTERONE INJECTION EVERY 14 DAYS       Blood Collection Needle 20G X 1\" MISC Us to inject testosterone IM 6 each 3     hydrochlorothiazide (HYDRODIURIL) 25 MG tablet Take 1 tablet (25 mg) by mouth daily 90 tablet 3     losartan (COZAAR) 100 MG tablet Take 1 tablet (100 mg) by mouth daily 90 tablet 3     losartan (COZAAR) 25 MG tablet Take 50 mg by mouth daily       Needle, Disp, (BD DISP NEEDLES) 20G X 1\" MISC USE FOR TESTOSTERONE IN THE MUSCLE INJECTION 6 each 1     Omega-3 Fatty Acids (FISH OIL PO)        Syringe/Needle, Disp, 20G X 1-1/2\" 3 ML MISC Use for IM testosterone injection 6 each 1     Syringe/Needle, Disp, 20G X 1-1/2\" 3 " "ML MISC Use to draw up testosterone for IM injection 6 each 3     syringe/needle, disp, 23G X 1\" 3 ML MISC Use for  Testosterone injection every 14 days LUER  LOCK 6 each 3     testosterone cypionate (DEPOTESTOSTERONE) 100 MG/ML injection Inject 1.5 mLs (150 mg) into the muscle every 14 days 10 mL 1     warfarin ANTICOAGULANT (COUMADIN) 10 MG tablet Take 12.5 mg (5 mg x 0.5 tablet and 10 mg tablet) daily by mouth. Adjust dose based on INR results as directed. 90 tablet 1     warfarin ANTICOAGULANT (COUMADIN) 5 MG tablet Take 1/2 tablet by mouth daily along with 10 mg tablet for a total of 12.5 mg daily. Adjust dose based on INR results as directed. 60 tablet 1       Allergies  Allergies   Allergen Reactions     Nkda [No Known Drug Allergies]          Family History  family history includes Asthma in his mother; C.A.D. (age of onset: 42) in his paternal uncle; Depression in his mother; Heart Surgery (age of onset: 60) in his father; Other Cancer in his maternal grandmother.    Social History  Social History     Tobacco Use     Smoking status: Never     Smokeless tobacco: Former     Types: Chew     Quit date: 07/2018     Tobacco comments:     Chew maybe once per week.   Substance Use Topics     Alcohol use: Yes     Alcohol/week: 0.0 standard drinks     Comment:  quit post stroke 7-2018     Drug use: No       Physical Exam  /76 (BP Location: Right arm, Patient Position: Sitting, Cuff Size: Adult Regular)   Pulse 61   Wt 112.9 kg (249 lb)   BMI 33.77 kg/m    Body mass index is 33.77 kg/m .    Physical Exam    GENERAL :  In no apparent distress  SKIN: Normal color, normal temperature     EYES: EOMI, No scleral icterus  NECK: No visible masses  RESP: No respiratory distress, normal effort  CARDIO: regular rate  ABDOMEN: flat, nondistended       NEURO: awake, alert, responds appropriately to questions   EXTREMITIES: No clubbing, cyanosis or edema.    I spent a total of 46 minutes on the day of this new patient " visit on chart review, lab review, review of imaging, reviewing previous providers notes, exam and counseling of patient

## 2022-12-21 ENCOUNTER — MYC MEDICAL ADVICE (OUTPATIENT)
Dept: ANTICOAGULATION | Facility: CLINIC | Age: 32
End: 2022-12-21

## 2022-12-21 ENCOUNTER — LAB (OUTPATIENT)
Dept: LAB | Facility: CLINIC | Age: 32
End: 2022-12-21
Payer: COMMERCIAL

## 2022-12-21 ENCOUNTER — ANTICOAGULATION THERAPY VISIT (OUTPATIENT)
Dept: ANTICOAGULATION | Facility: CLINIC | Age: 32
End: 2022-12-21

## 2022-12-21 DIAGNOSIS — Z79.01 CHRONIC ANTICOAGULATION: ICD-10-CM

## 2022-12-21 DIAGNOSIS — Z86.73 HISTORY OF CVA (CEREBROVASCULAR ACCIDENT): ICD-10-CM

## 2022-12-21 DIAGNOSIS — R79.89 LOW SERUM TESTOSTERONE: ICD-10-CM

## 2022-12-21 DIAGNOSIS — Z86.711 HISTORY OF PULMONARY EMBOLISM: ICD-10-CM

## 2022-12-21 DIAGNOSIS — D68.61 ANTIPHOSPHOLIPID ANTIBODY SYNDROME (H): Primary | ICD-10-CM

## 2022-12-21 DIAGNOSIS — E29.1 HYPOGONADISM MALE: ICD-10-CM

## 2022-12-21 DIAGNOSIS — D68.61 ANTIPHOSPHOLIPID SYNDROME (H): ICD-10-CM

## 2022-12-21 DIAGNOSIS — D68.61 ANTIPHOSPHOLIPID ANTIBODY SYNDROME (H): ICD-10-CM

## 2022-12-21 LAB — FACT X ACT/NOR PPP CHRO: 35 % (ref 70–130)

## 2022-12-21 PROCEDURE — 36415 COLL VENOUS BLD VENIPUNCTURE: CPT

## 2022-12-21 PROCEDURE — 85260 CLOT FACTOR X STUART-POWER: CPT

## 2022-12-21 NOTE — PROGRESS NOTES
Anticoagulation Monitoring CHROMOGENIC FACTOR 10   Latest Ref Rng & Units 70 - 130 %   12/21/2022 35 (L)   11/25/2022 11/23/2022 30 (L)   10/24/2022 29 (L)   9/28/2022 43 (L)   9/28/2022 8/17/2022 34 (L)   7/13/2022 32 (L)     ANTICOAGULATION MANAGEMENT     Tone Marley 32 year old male is on warfarin with therapeutic result. (Goal Chromogenic Factor X 40-20%)    Recent labs: (last 7 days)     12/21/22  0835   [id number] 35*       ASSESSMENT     Source(s): Chart Review and Patient/Caregiver Call       Warfarin doses taken: Warfarin taken as instructed    Diet: No new diet changes identified    New illness, injury, or hospitalization: No    Medication/supplement changes: None noted    Signs or symptoms of bleeding or clotting: No    Previous result: Therapeutic last 2(+) visits    Additional findings: None       PLAN     Recommended plan for no diet, medication or health factor changes affecting result    Dosing Instructions: Continue your current warfarin dose 15 mg Wed Sat; 12.5 mg all other days with next Chromogenic Factor X in 4 weeks       Sent [employer] message with dosing and follow up instructions    Contact [phone]  to schedule and with any changes, questions or concerns.     Education provided:     Please call back if any changes to your diet, medications or how you've been taking warfarin    Plan made per [facility] anticoagulation protocol

## 2022-12-27 ENCOUNTER — LAB (OUTPATIENT)
Dept: LAB | Facility: CLINIC | Age: 32
End: 2022-12-27
Payer: COMMERCIAL

## 2022-12-27 DIAGNOSIS — Z86.711 HISTORY OF PULMONARY EMBOLISM: ICD-10-CM

## 2022-12-27 DIAGNOSIS — D68.61 ANTIPHOSPHOLIPID SYNDROME (H): ICD-10-CM

## 2022-12-27 DIAGNOSIS — R79.89 LOW SERUM TESTOSTERONE: ICD-10-CM

## 2022-12-27 DIAGNOSIS — D68.61 ANTIPHOSPHOLIPID ANTIBODY SYNDROME (H): ICD-10-CM

## 2022-12-27 DIAGNOSIS — E29.1 HYPOGONADISM MALE: ICD-10-CM

## 2022-12-27 DIAGNOSIS — E22.1 HYPERPROLACTINEMIA (H): ICD-10-CM

## 2022-12-27 DIAGNOSIS — Z79.01 CHRONIC ANTICOAGULATION: ICD-10-CM

## 2022-12-27 LAB
BASOPHILS # BLD AUTO: 0 10E3/UL (ref 0–0.2)
BASOPHILS NFR BLD AUTO: 1 %
EOSINOPHIL # BLD AUTO: 0.1 10E3/UL (ref 0–0.7)
EOSINOPHIL NFR BLD AUTO: 3 %
ERYTHROCYTE [DISTWIDTH] IN BLOOD BY AUTOMATED COUNT: 12.8 % (ref 10–15)
FSH SERPL IRP2-ACNC: <0.3 MIU/ML (ref 1.5–12.4)
HCT VFR BLD AUTO: 45.1 % (ref 40–53)
HGB BLD-MCNC: 15.3 G/DL (ref 13.3–17.7)
IMM GRANULOCYTES # BLD: 0 10E3/UL
IMM GRANULOCYTES NFR BLD: 0 %
LH SERPL-ACNC: <0.3 MIU/ML (ref 1.7–8.6)
LYMPHOCYTES # BLD AUTO: 1.7 10E3/UL (ref 0.8–5.3)
LYMPHOCYTES NFR BLD AUTO: 35 %
MCH RBC QN AUTO: 29.1 PG (ref 26.5–33)
MCHC RBC AUTO-ENTMCNC: 33.9 G/DL (ref 31.5–36.5)
MCV RBC AUTO: 86 FL (ref 78–100)
MONOCYTES # BLD AUTO: 0.5 10E3/UL (ref 0–1.3)
MONOCYTES NFR BLD AUTO: 10 %
NEUTROPHILS # BLD AUTO: 2.5 10E3/UL (ref 1.6–8.3)
NEUTROPHILS NFR BLD AUTO: 52 %
PLATELET # BLD AUTO: 190 10E3/UL (ref 150–450)
PROLACTIN SERPL 3RD IS-MCNC: 58 NG/ML (ref 4–15)
RBC # BLD AUTO: 5.26 10E6/UL (ref 4.4–5.9)
WBC # BLD AUTO: 4.8 10E3/UL (ref 4–11)

## 2022-12-27 PROCEDURE — 84443 ASSAY THYROID STIM HORMONE: CPT

## 2022-12-27 PROCEDURE — 85025 COMPLETE CBC W/AUTO DIFF WBC: CPT

## 2022-12-27 PROCEDURE — 83001 ASSAY OF GONADOTROPIN (FSH): CPT

## 2022-12-27 PROCEDURE — 84439 ASSAY OF FREE THYROXINE: CPT

## 2022-12-27 PROCEDURE — 84403 ASSAY OF TOTAL TESTOSTERONE: CPT

## 2022-12-27 PROCEDURE — 84146 ASSAY OF PROLACTIN: CPT

## 2022-12-27 PROCEDURE — 99000 SPECIMEN HANDLING OFFICE-LAB: CPT

## 2022-12-27 PROCEDURE — 36415 COLL VENOUS BLD VENIPUNCTURE: CPT

## 2022-12-27 PROCEDURE — 83002 ASSAY OF GONADOTROPIN (LH): CPT

## 2022-12-29 LAB
T4 FREE SERPL-MCNC: 1.2 NG/DL (ref 0.9–1.7)
TESTOST SERPL-MCNC: 398 NG/DL (ref 240–950)
TSH SERPL DL<=0.005 MIU/L-ACNC: 4.34 UIU/ML (ref 0.3–4.2)

## 2022-12-30 LAB
PROLACTIN SERPL IA-MCNC: 36.4 NG/ML
PROLACTIN.MONOMERIC SERPL-MCNC: 26.2 NG/ML
PROLACTIN.MONOMERIC/TOTAL MFR SERPL: 72 %

## 2023-01-14 ENCOUNTER — HEALTH MAINTENANCE LETTER (OUTPATIENT)
Age: 33
End: 2023-01-14

## 2023-01-17 DIAGNOSIS — Z86.73 HISTORY OF CVA (CEREBROVASCULAR ACCIDENT): ICD-10-CM

## 2023-01-17 DIAGNOSIS — Z79.01 CHRONIC ANTICOAGULATION: ICD-10-CM

## 2023-01-17 DIAGNOSIS — Z86.711 HISTORY OF PULMONARY EMBOLISM: ICD-10-CM

## 2023-01-17 DIAGNOSIS — D68.61 ANTIPHOSPHOLIPID ANTIBODY SYNDROME (H): ICD-10-CM

## 2023-01-17 RX ORDER — WARFARIN SODIUM 10 MG/1
TABLET ORAL
Qty: 90 TABLET | Refills: 1 | Status: SHIPPED | OUTPATIENT
Start: 2023-01-17 | End: 2023-02-22

## 2023-01-17 NOTE — TELEPHONE ENCOUNTER
ANTICOAGULATION MANAGEMENT:  Medication Refill    Anticoagulation Summary  As of 12/21/2022    Warfarin maintenance plan:  15 mg (5 mg x 1 and 10 mg x 1) every Wed, Sat; 12.5 mg (5 mg x 0.5 and 10 mg x 1) all other days   Next INR check:  1/18/2023   Target end date:  Indefinite    Indications    Antiphospholipid antibody syndrome (H) [D68.61]  Chronic anticoagulation [Z79.01]  Antiphospholipid syndrome (H) [D68.61]  History of pulmonary embolism [Z86.711]  History of CVA (cerebrovascular accident) [Z86.73]             Anticoagulation Care Providers     Provider Role Specialty Phone number    Yara Faust MD Referring Family Medicine [phone]          Visit with referring provider/group within last year: Yes    [facility] referral signed within last year: Yes    Tone meets all criteria for refill (current [facility] referral, office visit with referring provider/group in last year, lab monitoring up to date or not exceeding 2 weeks overdue). Rx instructions and quantity supplied updated to match patient's current dosing plan. Warfarin 90 day supply with 1 refill granted per [facility] protocol     Jed Hilario RN  Anticoagulation Clinic

## 2023-01-18 ENCOUNTER — LAB (OUTPATIENT)
Dept: LAB | Facility: CLINIC | Age: 33
End: 2023-01-18
Payer: COMMERCIAL

## 2023-01-18 ENCOUNTER — ANTICOAGULATION THERAPY VISIT (OUTPATIENT)
Dept: ANTICOAGULATION | Facility: CLINIC | Age: 33
End: 2023-01-18

## 2023-01-18 DIAGNOSIS — Z79.01 CHRONIC ANTICOAGULATION: ICD-10-CM

## 2023-01-18 DIAGNOSIS — D68.61 ANTIPHOSPHOLIPID ANTIBODY SYNDROME (H): ICD-10-CM

## 2023-01-18 DIAGNOSIS — Z86.711 HISTORY OF PULMONARY EMBOLISM: ICD-10-CM

## 2023-01-18 DIAGNOSIS — D68.61 ANTIPHOSPHOLIPID ANTIBODY SYNDROME (H): Primary | ICD-10-CM

## 2023-01-18 DIAGNOSIS — D68.61 ANTIPHOSPHOLIPID SYNDROME (H): ICD-10-CM

## 2023-01-18 DIAGNOSIS — Z86.73 HISTORY OF CVA (CEREBROVASCULAR ACCIDENT): ICD-10-CM

## 2023-01-18 LAB — FACT X ACT/NOR PPP CHRO: 23 % (ref 70–130)

## 2023-01-18 PROCEDURE — 36415 COLL VENOUS BLD VENIPUNCTURE: CPT

## 2023-01-18 PROCEDURE — 85260 CLOT FACTOR X STUART-POWER: CPT

## 2023-01-18 NOTE — PROGRESS NOTES
ANTICOAGULATION MANAGEMENT     Shahram Sitzman 32 year old male is on warfarin with therapeutic result. (Goal Chromogenic Factor X 40-20%)    Recent labs: (last 7 days)     01/18/23  0814   [id number] 23*     Anticoagulation Monitoring CHROMOGENIC FACTOR 10   Latest Ref Rng & Units 70 - 130 %   1/18/2023 23 (L)   12/21/2022 35 (L)   12/21/2022 11/25/2022 11/23/2022 30 (L)   10/24/2022 29 (L)   9/28/2022 43 (L)       ASSESSMENT     Source(s): Chart Review and Patient/Caregiver Call       Warfarin doses taken: Warfarin taken as instructed    Diet: No new diet changes identified    New illness, injury, or hospitalization: No    Medication/supplement changes: fenugreen seed started on ~2 weeks ago which has potential for interaction; decreasing Chromogenic Factor X     Signs or symptoms of bleeding or clotting: No    Previous result: Therapeutic last 2(+) visits    Additional findings: None       PLAN     Recommended plan for ongoing change(s) affecting result    Dosing Instructions: Continue your current warfarin dose 15 mg Wed,Sat. 12.5 mg all other days with next Chromogenic Factor X in 5 weeks       Telephone call with Tone who verbalizes understanding and agrees to plan    Lab visit scheduled    Education provided:     Goal range and lab monitoring: goal range and significance of current result    Interaction IS anticipated between warfarin and fenugreek seeds    Plan made per [facility] anticoagulation protocol

## 2023-01-24 ENCOUNTER — MYC MEDICAL ADVICE (OUTPATIENT)
Dept: FAMILY MEDICINE | Facility: CLINIC | Age: 33
End: 2023-01-24
Payer: COMMERCIAL

## 2023-01-25 ENCOUNTER — DOCUMENTATION ONLY (OUTPATIENT)
Dept: LAB | Facility: CLINIC | Age: 33
End: 2023-01-25
Payer: COMMERCIAL

## 2023-01-25 DIAGNOSIS — Z11.3 ROUTINE SCREENING FOR STI (SEXUALLY TRANSMITTED INFECTION): ICD-10-CM

## 2023-01-31 ENCOUNTER — LAB (OUTPATIENT)
Dept: LAB | Facility: CLINIC | Age: 33
End: 2023-01-31
Payer: COMMERCIAL

## 2023-01-31 ENCOUNTER — TELEPHONE (OUTPATIENT)
Dept: ENDOCRINOLOGY | Facility: CLINIC | Age: 33
End: 2023-01-31

## 2023-01-31 DIAGNOSIS — I10 BENIGN ESSENTIAL HYPERTENSION: ICD-10-CM

## 2023-01-31 DIAGNOSIS — Z11.3 ROUTINE SCREENING FOR STI (SEXUALLY TRANSMITTED INFECTION): ICD-10-CM

## 2023-01-31 DIAGNOSIS — E78.2 MIXED HYPERLIPIDEMIA: ICD-10-CM

## 2023-01-31 LAB
HIV 1+2 AB+HIV1 P24 AG SERPL QL IA: NONREACTIVE
T PALLIDUM AB SER QL: NONREACTIVE

## 2023-01-31 PROCEDURE — 87591 N.GONORRHOEAE DNA AMP PROB: CPT

## 2023-01-31 PROCEDURE — 87491 CHLMYD TRACH DNA AMP PROBE: CPT

## 2023-01-31 PROCEDURE — 87389 HIV-1 AG W/HIV-1&-2 AB AG IA: CPT

## 2023-01-31 PROCEDURE — 36415 COLL VENOUS BLD VENIPUNCTURE: CPT

## 2023-01-31 PROCEDURE — 86780 TREPONEMA PALLIDUM: CPT

## 2023-01-31 NOTE — TELEPHONE ENCOUNTER
[facility] Call Center    Phone Message    May a detailed message be left on voicemail: yes     Reason for Call: Other: patient called in and would like a call back to discuss their lab results. Please reach out as soon as possible. Advised that he will keep calling back each day till he hears from someone about his labs. Thank you.     Action Taken: Other: Endo    Travel Screening: Not Applicable

## 2023-02-01 ENCOUNTER — VIRTUAL VISIT (OUTPATIENT)
Dept: ENDOCRINOLOGY | Facility: CLINIC | Age: 33
End: 2023-02-01
Payer: COMMERCIAL

## 2023-02-01 DIAGNOSIS — R79.89 LOW SERUM TESTOSTERONE: Primary | ICD-10-CM

## 2023-02-01 DIAGNOSIS — E78.2 MIXED HYPERLIPIDEMIA: ICD-10-CM

## 2023-02-01 LAB
C TRACH DNA SPEC QL NAA+PROBE: NEGATIVE
N GONORRHOEA DNA SPEC QL NAA+PROBE: NEGATIVE

## 2023-02-01 PROCEDURE — 99212 OFFICE O/P EST SF 10 MIN: CPT | Mod: 95 | Performed by: INTERNAL MEDICINE

## 2023-02-01 RX ORDER — TESTOSTERONE 1.62 MG/G
2 GEL TRANSDERMAL DAILY
Qty: 75 G | Refills: 5 | Status: SHIPPED | OUTPATIENT
Start: 2023-02-01 | End: 2023-05-01

## 2023-02-01 RX ORDER — LOSARTAN POTASSIUM 100 MG/1
TABLET ORAL
Qty: 30 TABLET | Refills: 0 | Status: SHIPPED | OUTPATIENT
Start: 2023-02-01 | End: 2023-02-22

## 2023-02-01 RX ORDER — HYDROCHLOROTHIAZIDE 25 MG/1
TABLET ORAL
Qty: 30 TABLET | Refills: 0 | Status: SHIPPED | OUTPATIENT
Start: 2023-02-01 | End: 2023-02-22

## 2023-02-01 RX ORDER — ATORVASTATIN CALCIUM 10 MG/1
TABLET, FILM COATED ORAL
Qty: 30 TABLET | Refills: 0 | Status: SHIPPED | OUTPATIENT
Start: 2023-02-01 | End: 2023-02-22

## 2023-02-01 ASSESSMENT — ENCOUNTER SYMPTOMS
WEIGHT GAIN: 1
NERVOUS/ANXIOUS: 0
WEIGHT LOSS: 0
CHILLS: 0
INSOMNIA: 0
POLYPHAGIA: 0
FATIGUE: 0
POLYDIPSIA: 0
ALTERED TEMPERATURE REGULATION: 0
HALLUCINATIONS: 0
DECREASED APPETITE: 0
NIGHT SWEATS: 0
PANIC: 0
INCREASED ENERGY: 1
FEVER: 0
DEPRESSION: 1
DECREASED CONCENTRATION: 0

## 2023-02-01 NOTE — TELEPHONE ENCOUNTER
Medication is being filled for 1 time refill only due to:  Patient needs to be seen because it has been more than one year since last visit.     Due for physical.  Last 1/11/22.    Kaylie Wilde RN

## 2023-02-01 NOTE — TELEPHONE ENCOUNTER
Pt returned my call 2/1/23. Scheduled pt for a follow up appointment with Dr. Lake to review results.

## 2023-02-01 NOTE — TELEPHONE ENCOUNTER
Medication is being filled for 1 time refill only due to:  Patient needs to be seen because it has been more than one year since last visit.   Due for physical .  Last 1/11/22.    Kaylie Wilde RN

## 2023-02-01 NOTE — LETTER
2/1/2023         RE: Tone Marley  [address]  [facility] [place] [address]        Dear Colleague,    Thank you for referring your patient, Tone Marley, to the [facility] ENDOCRINOLOGY. Please see a copy of my visit note below.    Tone is a 32 year old who is being evaluated via a billable telephone visit.      What phone number would you like to be contacted at? [phone]  How would you like to obtain your AVS? Enriquehart    Distant Location (provider location):  On-site  Phone call duration: 10 minutes    Called patient to discuss lab results and new management of his hypogonadism.     Issues discussed today:    -prolactin mildly elevated but negative for macroprolactin - can monitor for now  -Pt has spoken to insurance and can affordably switch to androgel if he prefers.  Now getting symptomatic prior to next injection while doing the 150mg testo cyp q14 so would like to make this change.   -discussed fertility concerns - he would like to preserve fertility if at all possible.      Plan:  -switch to androgel 2 pumps/day.  He injected testosterone yesterday (1/31) so can start the androgel on Monday if prior auth and supply have been acquired by then else he has supply of cypionate for one more injection if needed prior to starting androgel    -told pt to look into sperm preservation options and if they would be affordable in case that is necessary - no immediate plans for pregnancy but potentially in the near future.  Depending on labs can consider HCG/FSH injections.      -at followup in May will repeat testo labs, FSH/LH, and will discuss doing a sperm analysis to get a baseline and do testicular exam to rule out structural issues.     Pt in agreement with plan.  Will plan to see him in clinic as scheduled on 5/1 and discuss next steps for lab workup at that time.      Answers for HPI/ROS submitted by the patient on 2/1/2023  General Symptoms: Yes  Skin Symptoms: No  HENT  Symptoms: No  EYE SYMPTOMS: No  HEART SYMPTOMS: No  LUNG SYMPTOMS: No  INTESTINAL SYMPTOMS: No  URINARY SYMPTOMS: No  REPRODUCTIVE SYMPTOMS: Yes  SKELETAL SYMPTOMS: No  BLOOD SYMPTOMS: No  NERVOUS SYSTEM SYMPTOMS: No  MENTAL HEALTH SYMPTOMS: Yes  Fever: No  Loss of appetite: No  Weight loss: No  Weight gain: Yes  Fatigue: No  Night sweats: No  Chills: No  Increased stress: No  Excessive hunger: No  Excessive thirst: No  Feeling hot or cold when others believe the temperature is normal: No  Loss of height: No  Post-operative complications: No  Surgical site pain: No  Hallucinations: No  Change in or Loss of Energy: Yes  Hyperactivity: No  Confusion: No  Scrotal pain or swelling: No  Erectile dysfunction: Yes  Penile discharge: No  Genital ulcers: No  Reduced libido: Yes  Nervous or Anxious: No  Depression: Yes  Trouble sleeping: No  Trouble thinking or concentrating: No  Mood changes: No  Panic attacks: No          Again, thank you for allowing me to participate in the care of your patient.        Sincerely,        Jonathon Contreras MD

## 2023-02-01 NOTE — PROGRESS NOTES
Tone is a 32 year old who is being evaluated via a billable telephone visit.      What phone number would you like to be contacted at? [phone]  How would you like to obtain your AVS? Nick    Distant Location (provider location):  On-site  Phone call duration: 10 minutes

## 2023-02-01 NOTE — TELEPHONE ENCOUNTER
[facility] for pt to c/b to schedule sooner appointment with Dr. Contreras. Left direct number [phone] for pt to c/b.

## 2023-02-01 NOTE — TELEPHONE ENCOUNTER
----- Message from Aracely Bullard RN sent at 2/1/2023  8:52 AM CST -----  Regarding: Please offer sooner visit for lab review and recommendation  If Pt is willing, if not, please document and forward to Vanessa and Myself. Thank you.

## 2023-02-01 NOTE — PATIENT INSTRUCTIONS
Start androgel 2 pumps per day on clean dry skin (do not use shower gels to clean that area of your skin as some can leave a lotion layer).      Look into sperm preservation options - specifically cost - so that we can discuss all the options and labs at your followup in May.     I will order testosterone labs to be done in 6 weeks if needed (I.e. if you are symptomatic on the androgel) but otherwise we will check all labs at your followup visit in May.

## 2023-02-01 NOTE — PROGRESS NOTES
Called patient to discuss lab results and new management of his hypogonadism.     Issues discussed today:    -prolactin mildly elevated but negative for macroprolactin - can monitor for now  -Pt has spoken to insurance and can affordably switch to androgel if he prefers.  Now getting symptomatic prior to next injection while doing the 150mg testo cyp q14 so would like to make this change.   -discussed fertility concerns - he would like to preserve fertility if at all possible.      Plan:  -switch to androgel 2 pumps/day.  He injected testosterone yesterday (1/31) so can start the androgel on Monday if prior auth and supply have been acquired by then else he has supply of cypionate for one more injection if needed prior to starting androgel    -told pt to look into sperm preservation options and if they would be affordable in case that is necessary - no immediate plans for pregnancy but potentially in the near future.  Depending on labs can consider HCG/FSH injections.      -at followup in May will repeat testo labs, FSH/LH, and will discuss doing a sperm analysis to get a baseline and do testicular exam to rule out structural issues.     Pt in agreement with plan.  Will plan to see him in clinic as scheduled on 5/1 and discuss next steps for lab workup at that time.      Answers for HPI/ROS submitted by the patient on 2/1/2023  General Symptoms: Yes  Skin Symptoms: No  HENT Symptoms: No  EYE SYMPTOMS: No  HEART SYMPTOMS: No  LUNG SYMPTOMS: No  INTESTINAL SYMPTOMS: No  URINARY SYMPTOMS: No  REPRODUCTIVE SYMPTOMS: Yes  SKELETAL SYMPTOMS: No  BLOOD SYMPTOMS: No  NERVOUS SYSTEM SYMPTOMS: No  MENTAL HEALTH SYMPTOMS: Yes  Fever: No  Loss of appetite: No  Weight loss: No  Weight gain: Yes  Fatigue: No  Night sweats: No  Chills: No  Increased stress: No  Excessive hunger: No  Excessive thirst: No  Feeling hot or cold when others believe the temperature is normal: No  Loss of height: No  Post-operative complications:  No  Surgical site pain: No  Hallucinations: No  Change in or Loss of Energy: Yes  Hyperactivity: No  Confusion: No  Scrotal pain or swelling: No  Erectile dysfunction: Yes  Penile discharge: No  Genital ulcers: No  Reduced libido: Yes  Nervous or Anxious: No  Depression: Yes  Trouble sleeping: No  Trouble thinking or concentrating: No  Mood changes: No  Panic attacks: No

## 2023-02-02 ENCOUNTER — TELEPHONE (OUTPATIENT)
Dept: ENDOCRINOLOGY | Facility: CLINIC | Age: 33
End: 2023-02-02
Payer: COMMERCIAL

## 2023-02-02 RX ORDER — ATORVASTATIN CALCIUM 10 MG/1
TABLET, FILM COATED ORAL
Qty: 1 TABLET | Refills: 0 | OUTPATIENT
Start: 2023-02-02

## 2023-02-02 NOTE — TELEPHONE ENCOUNTER
Prior Authorization Retail Medication Request    Medication/Dose: testosterone (ANDROGEL 1.62 % PUMP) 20.25 MG/ACT gel  ICD code (if different than what is on RX):    Previously Tried and Failed:    Rationale:      Insurance Name:  [employer] [place]   Insurance ID:  [id number]       Pharmacy Information (if different than what is on RX)  [place]:  [facility] [place]   Phone:  [phone]

## 2023-02-05 NOTE — TELEPHONE ENCOUNTER
Prior Authorization Approval    Authorization Effective Date: 2/8/2023  Authorization Expiration Date: 2/8/2024  Medication: testosterone (ANDROGEL 1.62 % PUMP) 20.25 MG/ACT gel  Approved Dose/Quantity:   Reference #:     Insurance Company: [employer] - Phone [phone] Fax [phone]  Expected CoPay:       CoPay Card Available:      Foundation Assistance Needed:    Which Pharmacy is filling the prescription (Not needed for infusion/clinic administered): [id number] - [place], [place] - [address] [place] [address] [address] AT [facility]  Pharmacy Notified: Yes  Patient Notified: No

## 2023-02-22 ENCOUNTER — LAB (OUTPATIENT)
Dept: LAB | Facility: CLINIC | Age: 33
End: 2023-02-22
Payer: COMMERCIAL

## 2023-02-22 ENCOUNTER — VIRTUAL VISIT (OUTPATIENT)
Dept: FAMILY MEDICINE | Facility: CLINIC | Age: 33
End: 2023-02-22
Payer: COMMERCIAL

## 2023-02-22 DIAGNOSIS — Z86.711 HISTORY OF PULMONARY EMBOLISM: ICD-10-CM

## 2023-02-22 DIAGNOSIS — D68.61 ANTIPHOSPHOLIPID SYNDROME (H): ICD-10-CM

## 2023-02-22 DIAGNOSIS — Z79.01 CHRONIC ANTICOAGULATION: ICD-10-CM

## 2023-02-22 DIAGNOSIS — E22.1 HYPERPROLACTINEMIA (H): ICD-10-CM

## 2023-02-22 DIAGNOSIS — D68.61 ANTIPHOSPHOLIPID ANTIBODY SYNDROME (H): ICD-10-CM

## 2023-02-22 DIAGNOSIS — I10 BENIGN ESSENTIAL HYPERTENSION: Primary | ICD-10-CM

## 2023-02-22 DIAGNOSIS — Z86.73 HISTORY OF CVA (CEREBROVASCULAR ACCIDENT): ICD-10-CM

## 2023-02-22 DIAGNOSIS — E66.01 MORBID OBESITY (H): ICD-10-CM

## 2023-02-22 DIAGNOSIS — E78.2 MIXED HYPERLIPIDEMIA: ICD-10-CM

## 2023-02-22 PROCEDURE — 85260 CLOT FACTOR X STUART-POWER: CPT

## 2023-02-22 PROCEDURE — 99214 OFFICE O/P EST MOD 30 MIN: CPT | Mod: VID | Performed by: FAMILY MEDICINE

## 2023-02-22 PROCEDURE — 36415 COLL VENOUS BLD VENIPUNCTURE: CPT

## 2023-02-22 RX ORDER — ATORVASTATIN CALCIUM 10 MG/1
10 TABLET, FILM COATED ORAL DAILY
Qty: 90 TABLET | Refills: 3 | Status: SHIPPED | OUTPATIENT
Start: 2023-02-22 | End: 2024-03-25

## 2023-02-22 RX ORDER — HYDROCHLOROTHIAZIDE 25 MG/1
25 TABLET ORAL DAILY
Qty: 90 TABLET | Refills: 3 | Status: SHIPPED | OUTPATIENT
Start: 2023-02-22 | End: 2024-03-25

## 2023-02-22 RX ORDER — WARFARIN SODIUM 5 MG/1
TABLET ORAL
Qty: 90 TABLET | Refills: 3 | Status: SHIPPED | OUTPATIENT
Start: 2023-02-22 | End: 2024-04-23

## 2023-02-22 RX ORDER — WARFARIN SODIUM 10 MG/1
TABLET ORAL
Qty: 90 TABLET | Refills: 3 | Status: SHIPPED | OUTPATIENT
Start: 2023-02-22 | End: 2024-02-23

## 2023-02-22 RX ORDER — LOSARTAN POTASSIUM 100 MG/1
100 TABLET ORAL DAILY
Qty: 90 TABLET | Refills: 3 | Status: SHIPPED | OUTPATIENT
Start: 2023-02-22 | End: 2024-04-23

## 2023-02-22 NOTE — PROGRESS NOTES
Tone is a 32 year old who is being evaluated via a billable video visit.      How would you like to obtain your AVS? MyChart  If the video visit is dropped, the invitation should be resent by: Send to e-mail at: [email or web address]  Will anyone else be joining your video visit? No        Assessment & Plan     Benign essential hypertension  Plan.  Monitor BP on home blood pressure cuff.  -Encourage low-salt diet, encouraged regular physical activity.  Refill on Cozaar and hydrochlorothiazide is given for 1 year.   - losartan (COZAAR) 100 MG tablet; Take 1 tablet (100 mg) by mouth daily    - hydrochlorothiazide (HYDRODIURIL) 25 MG tablet; Take 1 tablet (25 mg) by mouth daily     Lab only appointment future for BMP.  - Basic metabolic panel  (Ca, Cl, CO2, Creat, Gluc, K, Na, BUN); Future      Mixed hyperlipidemia  Plan.  Encouraged healthy whole food based diet.  Encouraged regular exercise and he is motivated.  Refill on atorvastatin given for 1 year.  - atorvastatin (LIPITOR) 10 MG tablet; Take 1 tablet (10 mg) by mouth daily lab only appointment future for fasting lipid.      - Lipid Profile (Chol, Trig, HDL, LDL calc); Future    Antiphospholipid antibody syndrome (H)  Plan refill given.  She was on Plaquenil and has been taken off.  - warfarin ANTICOAGULANT (COUMADIN) 10 MG tablet; Take by mouth 15mg on Wednesday & Saturday; 12.5 mg all other days of the week or as directed by the Anticoagulation Clinic. Pt has 5mg tablets of warfarin in addition to 10mg.    Chronic anticoagulation  Due to pulmonary embolism and stroke.  He is on warfarin because he had stroke while he was getting- DAOC  - warfarin ANTICOAGULANT (COUMADIN) 10 MG tablet; Take by mouth 15mg on Wednesday & Saturday; 12.5 mg all other days of the week or as directed by the Anticoagulation Clinic. Pt has 5mg tablets of warfarin in addition to 10mg.  - warfarin ANTICOAGULANT (COUMADIN) 5 MG tablet; Take 1/2 tablet by mouth daily along with 10 mg  tablet for a total of 12.5 mg daily. Adjust dose based on INR results as directed.    History of pulmonary embolism  - warfarin ANTICOAGULANT (COUMADIN) 10 MG tablet; Take by mouth 15mg on Wednesday & Saturday; 12.5 mg all other days of the week or as directed by the Anticoagulation Clinic. Pt has 5mg tablets of warfarin in addition to 10mg.    History of CVA (cerebrovascular accident)  - warfarin ANTICOAGULANT (COUMADIN) 10 MG tablet; Take by mouth 15mg on Wednesday & Saturday; 12.5 mg all other days of the week or as directed by the Anticoagulation Clinic. Pt has 5mg tablets of warfarin in addition to 10mg.    Hyperprolactinemia (H)  Low testosterone level  - considered this problem when making today's plans  - no interventions today       -followed by specialist.      Morbid obesity (H)  Attempting to lose weight.    Need for vaccination reviewed.  Patient deferred COVID and he was also given reminder for Td booster.  Ordering of each unique test  Prescription drug management  I spent a total of 30 minutes on the day of the visit.   Time spent doing chart review, history and exam, documentation and further activities per the note       BMI:   Estimated body mass index is 33.77 kg/m  as calculated from the following:    Height as of 12/3/22: 1.829 m (6').    Weight as of 12/5/22: 112.9 kg (249 lb).   Weight management plan: Discussed healthy diet and exercise guidelines        No follow-ups on file.   Follow-up Visit   Expected date:  Mar 08, 2023 (Approximate)      Follow Up Appointment Details:     Follow-up with whom?: Me    Follow-Up for what?: Adult Preventive    How?: In Person                    Yara Faust MD  [facility]    Radha Wayne is a 32 year old, presenting for the following health issues:  No chief complaint on file.      History of Present Illness       Reason for visit:  Annual follow-up on maintenance medications.    He eats 2-3 servings of fruits and  vegetables daily.He consumes 0 sweetened beverage(s) daily.He exercises with enough effort to increase his heart rate 60 or more minutes per day.  He exercises with enough effort to increase his heart rate 4 days per week.   He is taking medications regularly.  Trying more cardio and cutting back on calories.    Working from home helps a lot,Gym in am,Able to meal prep-eager to lose more weight.  No side effect from any medication including body aches from atorvastatin.  He has plans to make lab only appointment at the clinic closer to [place].      Additional information is his endocrinology consultation and management because of low testosterone.  He is eager to continue with testosterone as it has been life-changing, at the same time guarded about possibility of not being able to fathered a child.    Hyperlipidemia Follow-Up      Are you regularly taking any medication or supplement to lower your cholesterol?   Yes- daily    Are you having muscle aches or other side effects that you think could be caused by your cholesterol lowering medication?  No    Hypertension Follow-up      Do you check your blood pressure regularly outside of the clinic? No     Are you following a low salt diet? Yes    Are your blood pressures ever more than 140 on the top number (systolic) OR more   than 90 on the bottom number (diastolic), for example 140/90? No        Review of Systems   Constitutional, HEENT, cardiovascular, pulmonary, GI, , musculoskeletal, neuro, skin, endocrine and psych systems are negative, except as otherwise noted.      Objective    Vitals - Patient Reported  Weight (Patient Reported): 108.9 kg (240 lb)  Height (Patient Reported): 182.9 cm (6')  BMI (Based on Pt Reported Ht/Wt): 32.55  Pain Score: No Pain (0)      Vitals:  No vitals were obtained today due to virtual visit.    Physical Exam   GENERAL: Healthy, alert and no distress  EYES: Eyes grossly normal to inspection.  No discharge or erythema, or  obvious scleral/conjunctival abnormalities.  RESP: No audible wheeze, cough, or visible cyanosis.  No visible retractions or increased work of breathing.    SKIN: Visible skin clear. No significant rash, abnormal pigmentation or lesions.  NEURO: Cranial nerves grossly intact.  Mentation and speech appropriate for age.  PSYCH: Mentation appears normal, affect normal/bright, judgement and insight intact, normal speech and appearance well-groomed.                Video-Visit Details    Type of service:  Video Visit     Originating Location (pt. Location): Home  Distant Location (provider location):  On-site  Platform used for Video Visit: Lorena

## 2023-02-23 ENCOUNTER — ANTICOAGULATION THERAPY VISIT (OUTPATIENT)
Dept: ANTICOAGULATION | Facility: CLINIC | Age: 33
End: 2023-02-23
Payer: COMMERCIAL

## 2023-02-23 DIAGNOSIS — Z86.73 HISTORY OF CVA (CEREBROVASCULAR ACCIDENT): ICD-10-CM

## 2023-02-23 DIAGNOSIS — Z86.711 HISTORY OF PULMONARY EMBOLISM: ICD-10-CM

## 2023-02-23 DIAGNOSIS — D68.61 ANTIPHOSPHOLIPID ANTIBODY SYNDROME (H): Primary | ICD-10-CM

## 2023-02-23 DIAGNOSIS — D68.61 ANTIPHOSPHOLIPID SYNDROME (H): ICD-10-CM

## 2023-02-23 DIAGNOSIS — Z79.01 CHRONIC ANTICOAGULATION: ICD-10-CM

## 2023-02-23 LAB — FACT X ACT/NOR PPP CHRO: 25 % (ref 70–130)

## 2023-02-23 NOTE — PROGRESS NOTES
ANTICOAGULATION MANAGEMENT     Tone Marley 32 year old male is on warfarin with therapeutic result. (Goal Chromogenic Factor X 40-20%)    Anticoagulation Monitoring [id number]   Latest Ref Rng & Units 70 - 130 %   2/22/2023 25 (L)   1/18/2023 23 (L)   12/21/2022 35 (L)       ASSESSMENT     Source(s): Chart Review and Patient/Caregiver Call       Warfarin doses taken: Warfarin taken as instructed    Diet: No new diet changes identified    New illness, injury, or hospitalization: No    Medication/supplement changes: None noted    Signs or symptoms of bleeding or clotting: No    Previous result: Therapeutic last 2(+) visits    Additional findings: None       PLAN     Recommended plan for no diet, medication or health factor changes affecting result    Dosing Instructions: Continue your current warfarin dose 15mg Wed/Sat, 12.5mg AOD with next Chromogenic Factor X in 6 weeks       Telephone call with Tone who verbalizes understanding and agrees to plan    Lab visit scheduled    Education provided:     Contact [phone] with any changes, questions or concerns.     Plan made per [facility] anticoagulation protocol

## 2023-04-05 ENCOUNTER — ANTICOAGULATION THERAPY VISIT (OUTPATIENT)
Dept: ANTICOAGULATION | Facility: CLINIC | Age: 33
End: 2023-04-05

## 2023-04-05 ENCOUNTER — LAB (OUTPATIENT)
Dept: LAB | Facility: CLINIC | Age: 33
End: 2023-04-05
Payer: COMMERCIAL

## 2023-04-05 DIAGNOSIS — D68.61 ANTIPHOSPHOLIPID SYNDROME (H): ICD-10-CM

## 2023-04-05 DIAGNOSIS — Z79.01 CHRONIC ANTICOAGULATION: ICD-10-CM

## 2023-04-05 DIAGNOSIS — D68.61 ANTIPHOSPHOLIPID ANTIBODY SYNDROME (H): Primary | ICD-10-CM

## 2023-04-05 DIAGNOSIS — Z86.711 HISTORY OF PULMONARY EMBOLISM: ICD-10-CM

## 2023-04-05 DIAGNOSIS — D68.61 ANTIPHOSPHOLIPID ANTIBODY SYNDROME (H): ICD-10-CM

## 2023-04-05 DIAGNOSIS — Z86.73 HISTORY OF CVA (CEREBROVASCULAR ACCIDENT): ICD-10-CM

## 2023-04-05 LAB — FACT X ACT/NOR PPP CHRO: 26 % (ref 70–130)

## 2023-04-05 PROCEDURE — 85260 CLOT FACTOR X STUART-POWER: CPT

## 2023-04-05 PROCEDURE — 36415 COLL VENOUS BLD VENIPUNCTURE: CPT

## 2023-04-23 ENCOUNTER — HEALTH MAINTENANCE LETTER (OUTPATIENT)
Age: 33
End: 2023-04-23

## 2023-04-26 ENCOUNTER — LAB (OUTPATIENT)
Dept: LAB | Facility: CLINIC | Age: 33
End: 2023-04-26
Payer: COMMERCIAL

## 2023-04-26 DIAGNOSIS — E78.2 MIXED HYPERLIPIDEMIA: ICD-10-CM

## 2023-04-26 DIAGNOSIS — R79.89 LOW SERUM TESTOSTERONE: ICD-10-CM

## 2023-04-26 DIAGNOSIS — Z86.711 HISTORY OF PULMONARY EMBOLISM: ICD-10-CM

## 2023-04-26 DIAGNOSIS — Z79.01 CHRONIC ANTICOAGULATION: ICD-10-CM

## 2023-04-26 DIAGNOSIS — I10 BENIGN ESSENTIAL HYPERTENSION: ICD-10-CM

## 2023-04-26 DIAGNOSIS — D68.61 ANTIPHOSPHOLIPID ANTIBODY SYNDROME (H): ICD-10-CM

## 2023-04-26 DIAGNOSIS — E29.1 HYPOGONADISM MALE: ICD-10-CM

## 2023-04-26 DIAGNOSIS — D68.61 ANTIPHOSPHOLIPID SYNDROME (H): ICD-10-CM

## 2023-04-26 LAB
ANION GAP SERPL CALCULATED.3IONS-SCNC: 12 MMOL/L (ref 7–15)
BUN SERPL-MCNC: 22.6 MG/DL (ref 6–20)
CALCIUM SERPL-MCNC: 10 MG/DL (ref 8.6–10)
CHLORIDE SERPL-SCNC: 101 MMOL/L (ref 98–107)
CHOLEST SERPL-MCNC: 136 MG/DL
CREAT SERPL-MCNC: 1.22 MG/DL (ref 0.67–1.17)
DEPRECATED HCO3 PLAS-SCNC: 27 MMOL/L (ref 22–29)
GFR SERPL CREATININE-BSD FRML MDRD: 79 ML/MIN/1.73M2
GLUCOSE SERPL-MCNC: 96 MG/DL (ref 70–99)
HCT VFR BLD AUTO: 44.6 % (ref 40–53)
HDLC SERPL-MCNC: 44 MG/DL
HGB BLD-MCNC: 15.4 G/DL (ref 13.3–17.7)
LDLC SERPL CALC-MCNC: 73 MG/DL
NONHDLC SERPL-MCNC: 92 MG/DL
POTASSIUM SERPL-SCNC: 4 MMOL/L (ref 3.4–5.3)
SODIUM SERPL-SCNC: 140 MMOL/L (ref 136–145)
TRIGL SERPL-MCNC: 93 MG/DL

## 2023-04-26 PROCEDURE — 84403 ASSAY OF TOTAL TESTOSTERONE: CPT

## 2023-04-26 PROCEDURE — 85260 CLOT FACTOR X STUART-POWER: CPT

## 2023-04-26 PROCEDURE — 80048 BASIC METABOLIC PNL TOTAL CA: CPT

## 2023-04-26 PROCEDURE — 85014 HEMATOCRIT: CPT

## 2023-04-26 PROCEDURE — 80061 LIPID PANEL: CPT

## 2023-04-26 PROCEDURE — 85018 HEMOGLOBIN: CPT

## 2023-04-26 PROCEDURE — 36415 COLL VENOUS BLD VENIPUNCTURE: CPT

## 2023-04-27 ENCOUNTER — ANTICOAGULATION THERAPY VISIT (OUTPATIENT)
Dept: ANTICOAGULATION | Facility: CLINIC | Age: 33
End: 2023-04-27
Payer: COMMERCIAL

## 2023-04-27 DIAGNOSIS — D68.61 ANTIPHOSPHOLIPID SYNDROME (H): ICD-10-CM

## 2023-04-27 DIAGNOSIS — Z86.73 HISTORY OF CVA (CEREBROVASCULAR ACCIDENT): ICD-10-CM

## 2023-04-27 DIAGNOSIS — Z86.711 HISTORY OF PULMONARY EMBOLISM: ICD-10-CM

## 2023-04-27 DIAGNOSIS — D68.61 ANTIPHOSPHOLIPID ANTIBODY SYNDROME (H): Primary | ICD-10-CM

## 2023-04-27 DIAGNOSIS — Z79.01 CHRONIC ANTICOAGULATION: ICD-10-CM

## 2023-04-27 LAB — FACT X ACT/NOR PPP CHRO: 25 % (ref 70–130)

## 2023-04-27 NOTE — PROGRESS NOTES
ANTICOAGULATION MANAGEMENT     Shahram Sitzman 33 year old male is on warfarin with therapeutic result. (Goal Chromogenic Factor X 40-20%)    Recent labs: (last 7 days)     04/26/23  0858   [id number] 25*       ASSESSMENT     Source(s): Chart Review and Patient/Caregiver Call       Warfarin doses taken: Warfarin taken as instructed    Diet: No new diet changes identified    Medication/supplement changes: None noted    New illness, injury, or hospitalization: No    Signs or symptoms of bleeding or clotting: No    Previous result: Therapeutic last 2(+) visits    Additional findings: None       PLAN     Recommended plan for no diet, medication or health factor changes affecting result    Dosing Instructions: Continue your current warfarin dose 15 mg Wednesdays and Saturdays and 12.5 mg all other days. with next Chromogenic Factor X in 6 weeks       Telephone call with Tone who verbalizes understanding and agrees to plan and who agrees to plan and repeated back plan correctly    Lab visit scheduled    Education provided:     Please call back if any changes to your diet, medications or how you've been taking warfarin    Goal range and lab monitoring: Importance of therapeutic range    Plan made per [facility] anticoagulation protocol

## 2023-04-28 LAB — TESTOST SERPL-MCNC: 247 NG/DL (ref 240–950)

## 2023-05-01 ENCOUNTER — LAB (OUTPATIENT)
Dept: LAB | Facility: CLINIC | Age: 33
End: 2023-05-01
Payer: COMMERCIAL

## 2023-05-01 ENCOUNTER — OFFICE VISIT (OUTPATIENT)
Dept: ENDOCRINOLOGY | Facility: CLINIC | Age: 33
End: 2023-05-01
Payer: COMMERCIAL

## 2023-05-01 VITALS
SYSTOLIC BLOOD PRESSURE: 135 MMHG | BODY MASS INDEX: 33.09 KG/M2 | DIASTOLIC BLOOD PRESSURE: 82 MMHG | OXYGEN SATURATION: 97 % | HEART RATE: 60 BPM | WEIGHT: 244 LBS

## 2023-05-01 DIAGNOSIS — R79.89 LOW SERUM TESTOSTERONE: ICD-10-CM

## 2023-05-01 DIAGNOSIS — E22.1 HYPERPROLACTINEMIA (H): Primary | ICD-10-CM

## 2023-05-01 LAB
ALBUMIN SERPL BCG-MCNC: 4.7 G/DL (ref 3.5–5.2)
FSH SERPL IRP2-ACNC: <0.3 MIU/ML (ref 1.5–12.4)
LH SERPL-ACNC: <0.3 MIU/ML (ref 1.7–8.6)
PROLACTIN SERPL 3RD IS-MCNC: 51 NG/ML (ref 4–15)
SHBG SERPL-SCNC: 24 NMOL/L (ref 11–80)

## 2023-05-01 PROCEDURE — 82040 ASSAY OF SERUM ALBUMIN: CPT | Performed by: PATHOLOGY

## 2023-05-01 PROCEDURE — 84270 ASSAY OF SEX HORMONE GLOBUL: CPT | Performed by: PATHOLOGY

## 2023-05-01 PROCEDURE — 84403 ASSAY OF TOTAL TESTOSTERONE: CPT | Performed by: PATHOLOGY

## 2023-05-01 PROCEDURE — 83002 ASSAY OF GONADOTROPIN (LH): CPT | Performed by: PATHOLOGY

## 2023-05-01 PROCEDURE — 99000 SPECIMEN HANDLING OFFICE-LAB: CPT | Performed by: PATHOLOGY

## 2023-05-01 PROCEDURE — 99214 OFFICE O/P EST MOD 30 MIN: CPT | Performed by: INTERNAL MEDICINE

## 2023-05-01 PROCEDURE — 36415 COLL VENOUS BLD VENIPUNCTURE: CPT | Performed by: PATHOLOGY

## 2023-05-01 PROCEDURE — 84146 ASSAY OF PROLACTIN: CPT | Performed by: PATHOLOGY

## 2023-05-01 PROCEDURE — 83001 ASSAY OF GONADOTROPIN (FSH): CPT | Performed by: PATHOLOGY

## 2023-05-01 RX ORDER — TESTOSTERONE 1.62 MG/G
4 GEL TRANSDERMAL DAILY
Qty: 150 G | Refills: 5 | Status: SHIPPED | OUTPATIENT
Start: 2023-05-01 | End: 2023-10-03

## 2023-05-01 ASSESSMENT — PAIN SCALES - GENERAL: PAINLEVEL: NO PAIN (0)

## 2023-05-01 NOTE — PATIENT INSTRUCTIONS
Labs today. Increase androgel to 4 pumps/day.     Followup in 5 months with labs 1-2 weeks ahead of visit.

## 2023-05-01 NOTE — PROGRESS NOTES
Endocrine Consult note    Attending Assessment/Plan :        Low serum testosterone  Hyperprolactinemia (H)    Assessment:  -low normal total testosterone on 2 pumps/day androgel - tolerating well   -may benefit from tx of mild hyperPRL but can optimize testosterone dosing first  -appears to have some mild sx of hypogonadism at low-normal total testosterone     Plan:  -will recheck T today and also get SHBG, albumin, FSH, LH now that he's on topical therapy  -recheck PRL   -increase androgel to 4 pumps/day  -also ordered repeat labs for 1 week before next visit  -may consider adding cabergoline at next visit as first step if PRL is still elevated and T is low    RTC 5 months with repeat labs 1-2 weeks ahead of visit.     I have independently reviewed and interpreted labs, imaging as indicated.      Chief complaint:  Tone is a 33 year old male seen in consultation for hypogonadism.       HISTORY OF PRESENT ILLNESS    Tone is a 33M who comes to clinic for management of hypogonadism.     He also has a hx of antiphospholipid syndrome and is on anticoagulation for this. This was diagnosed at age 13 after he had a DVT in the lower extremity and PE.  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      Prior to therapy with testosterone he was having low libido, low energy, and difficulty losing weight.     At last visit he was on injectable testosterone and we switched to topical androgel 2 pumps/day.      He has tolerated this well and is glad to not be requiring injections anymore.  Recent lab recheck came back low-normal.  Sx mostly under control but is still having a bit of low libido.  Mild PRL elevated still, denies galactorrhea or gynecomastia.     Endocrine relevant labs and/or imaging are as follows:  Pituitary MRI in 2020 showed no obvious pituitary lesions.     REVIEW OF  SYSTEMS  Answers for HPI/ROS submitted by the patient on 4/25/2023  General Symptoms: No  Skin Symptoms: No  HENT Symptoms: No  EYE SYMPTOMS: No  HEART SYMPTOMS: No  LUNG SYMPTOMS: No  INTESTINAL SYMPTOMS: No  URINARY SYMPTOMS: No  REPRODUCTIVE SYMPTOMS: No  SKELETAL SYMPTOMS: No  BLOOD SYMPTOMS: No  NERVOUS SYSTEM SYMPTOMS: No  MENTAL HEALTH SYMPTOMS: No          10 system ROS otherwise as per the HPI or negative    Past Medical History  Past Medical History:   Diagnosis Date     ADHD (attention deficit hyperactivity disorder) 6/27/2012     Antiphospholipid antibody syndromes     at age 13     Antiphospholipid syndrome (H) 2/2/2012    On anticoagulation coumdin 10 mg once daily       Cerebral infarction (H) 7/1/18    Vertebral artery dissection     History of pulmonary embolism 9/2/2013    Was diagnosed  Positive CT scan in emergency department   now Under care of Dr Turcios at [facility],[phone] His nurse personal line [phone] Was on lovenox for 5 months  Now  For past one month on xareltlo 20 mg once daily and plaqunil twice daily       Hyperlipidemia LDL goal <130 6/11/2013     Hypertension goal BP (blood pressure) < 140/90 6/19/12     Hypertension, uncontrolled 6/27/2012     Postphlebitic syndrome 12/9/2014     Prehypertension 2/2/2012     Pulmonary embolism (H)     at age 13     Pulmonary embolism (H) 1/11/2015       Medications  Current Outpatient Medications   Medication Sig Dispense Refill     ASPIRIN PO Take 81 mg by mouth daily       atorvastatin (LIPITOR) 10 MG tablet Take 1 tablet (10 mg) by mouth daily 90 tablet 3     hydrochlorothiazide (HYDRODIURIL) 25 MG tablet Take 1 tablet (25 mg) by mouth daily 90 tablet 3     losartan (COZAAR) 100 MG tablet Take 1 tablet (100 mg) by mouth daily 90 tablet 3     losartan (COZAAR) 25 MG tablet Take 50 mg by mouth daily       Omega-3 Fatty Acids (FISH OIL PO)        testosterone (ANDROGEL 1.62 % PUMP) 20.25 MG/ACT gel Place 4 Pump onto the skin  daily Apply from dispenser to clean, dry, intact skin of the upper arms and shoulders. 150 g 5     warfarin ANTICOAGULANT (COUMADIN) 10 MG tablet Take by mouth 15mg on Wednesday & Saturday; 12.5 mg all other days of the week or as directed by the Anticoagulation Clinic. Pt has 5mg tablets of warfarin in addition to 10mg. 90 tablet 3     warfarin ANTICOAGULANT (COUMADIN) 5 MG tablet Take 1/2 tablet by mouth daily along with 10 mg tablet for a total of 12.5 mg daily. Adjust dose based on INR results as directed. 90 tablet 3       Allergies  Allergies   Allergen Reactions     Nkda [No Known Drug Allergy]          Family History  family history includes Asthma in his mother; C.A.D. (age of onset: 42) in his paternal uncle; Depression in his mother; Heart Surgery (age of onset: 60) in his father; Other Cancer in his maternal grandmother.    Social History  Social History     Tobacco Use     Smoking status: Never     Smokeless tobacco: Former     Types: Chew     Quit date: 07/2018     Tobacco comments:     Chew maybe once per week.   Vaping Use     Vaping status: Never Used   Substance Use Topics     Alcohol use: Yes     Alcohol/week: 0.0 standard drinks of alcohol     Comment:  quit post stroke 7-2018     Drug use: No       Physical Exam  /82   Pulse 60   Wt 110.7 kg (244 lb)   SpO2 97%   BMI 33.09 kg/m    Body mass index is 33.09 kg/m .    Physical Exam    GENERAL :  In no apparent distress  SKIN: Normal color, normal temperature     EYES: EOMI, No scleral icterus  NECK: No visible masses  RESP: No respiratory distress, normal effort  CARDIO: regular rate  ABDOMEN: flat, nondistended       NEURO: awake, alert, responds appropriately to questions   EXTREMITIES: No clubbing, cyanosis or edema.    I spent a total of 30 minutes on the day of this established patient visit on chart review, lab review, review of imaging, reviewing previous providers notes, exam and counseling of patient

## 2023-05-01 NOTE — LETTER
5/1/2023       RE: Tone Marley  [address]  [place] [address]     Dear Colleague,    Thank you for referring your patient, Tone Marley, to the [facility] [place] at [facility]. Please see a copy of my visit note below.    Endocrine Consult note    Attending Assessment/Plan :        Low serum testosterone  Hyperprolactinemia (H)    Assessment:  -low normal total testosterone on 2 pumps/day androgel - tolerating well   -may benefit from tx of mild hyperPRL but can optimize testosterone dosing first  -appears to have some mild sx of hypogonadism at low-normal total testosterone     Plan:  -will recheck T today and also get SHBG, albumin, FSH, LH now that he's on topical therapy  -recheck PRL   -increase androgel to 4 pumps/day  -also ordered repeat labs for 1 week before next visit  -may consider adding cabergoline at next visit as first step if PRL is still elevated and T is low    RTC 5 months with repeat labs 1-2 weeks ahead of visit.     I have independently reviewed and interpreted labs, imaging as indicated.      Chief complaint:  Tone is a 33 year old male seen in consultation for hypogonadism.       HISTORY OF PRESENT ILLNESS    Tone is a 33M who comes to clinic for management of hypogonadism.     He also has a hx of antiphospholipid syndrome and is on anticoagulation for this. This was diagnosed at age 13 after he had a DVT in the lower extremity and PE.  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      Prior to therapy with testosterone he was having low libido, low energy, and difficulty losing weight.     At last visit he was on injectable testosterone and we switched to topical androgel 2 pumps/day.      He has tolerated this well and is glad to not be requiring  injections anymore.  Recent lab recheck came back low-normal.  Sx mostly under control but is still having a bit of low libido.  Mild PRL elevated still, denies galactorrhea or gynecomastia.     Endocrine relevant labs and/or imaging are as follows:  Pituitary MRI in 2020 showed no obvious pituitary lesions.     REVIEW OF SYSTEMS  Answers for HPI/ROS submitted by the patient on 4/25/2023  General Symptoms: No  Skin Symptoms: No  HENT Symptoms: No  EYE SYMPTOMS: No  HEART SYMPTOMS: No  LUNG SYMPTOMS: No  INTESTINAL SYMPTOMS: No  URINARY SYMPTOMS: No  REPRODUCTIVE SYMPTOMS: No  SKELETAL SYMPTOMS: No  BLOOD SYMPTOMS: No  NERVOUS SYSTEM SYMPTOMS: No  MENTAL HEALTH SYMPTOMS: No          10 system ROS otherwise as per the HPI or negative    Past Medical History  Past Medical History:   Diagnosis Date    ADHD (attention deficit hyperactivity disorder) 6/27/2012    Antiphospholipid antibody syndromes     at age 13    Antiphospholipid syndrome (H) 2/2/2012    On anticoagulation coumdin 10 mg once daily      Cerebral infarction (H) 7/1/18    Vertebral artery dissection    History of pulmonary embolism 9/2/2013    Was diagnosed  Positive CT scan in emergency department   now Under care of Dr Turcios at [facility],[phone] His nurse personal line [phone] Was on lovenox for 5 months  Now  For past one month on xareltlo 20 mg once daily and plaqunil twice daily      Hyperlipidemia LDL goal <130 6/11/2013    Hypertension goal BP (blood pressure) < 140/90 6/19/12    Hypertension, uncontrolled 6/27/2012    Postphlebitic syndrome 12/9/2014    Prehypertension 2/2/2012    Pulmonary embolism (H)     at age 13    Pulmonary embolism (H) 1/11/2015       Medications  Current Outpatient Medications   Medication Sig Dispense Refill    ASPIRIN PO Take 81 mg by mouth daily      atorvastatin (LIPITOR) 10 MG tablet Take 1 tablet (10 mg) by mouth daily 90 tablet 3    hydrochlorothiazide (HYDRODIURIL) 25 MG tablet Take 1 tablet (25  mg) by mouth daily 90 tablet 3    losartan (COZAAR) 100 MG tablet Take 1 tablet (100 mg) by mouth daily 90 tablet 3    losartan (COZAAR) 25 MG tablet Take 50 mg by mouth daily      Omega-3 Fatty Acids (FISH OIL PO)       testosterone (ANDROGEL 1.62 % PUMP) 20.25 MG/ACT gel Place 4 Pump onto the skin daily Apply from dispenser to clean, dry, intact skin of the upper arms and shoulders. 150 g 5    warfarin ANTICOAGULANT (COUMADIN) 10 MG tablet Take by mouth 15mg on Wednesday & Saturday; 12.5 mg all other days of the week or as directed by the Anticoagulation Clinic. Pt has 5mg tablets of warfarin in addition to 10mg. 90 tablet 3    warfarin ANTICOAGULANT (COUMADIN) 5 MG tablet Take 1/2 tablet by mouth daily along with 10 mg tablet for a total of 12.5 mg daily. Adjust dose based on INR results as directed. 90 tablet 3       Allergies  Allergies   Allergen Reactions    Nkda [No Known Drug Allergy]          Family History  family history includes Asthma in his mother; C.A.D. (age of onset: 42) in his paternal uncle; Depression in his mother; Heart Surgery (age of onset: 60) in his father; Other Cancer in his maternal grandmother.    Social History  Social History     Tobacco Use    Smoking status: Never    Smokeless tobacco: Former     Types: Chew     Quit date: 07/2018    Tobacco comments:     Chew maybe once per week.   Vaping Use    Vaping status: Never Used   Substance Use Topics    Alcohol use: Yes     Alcohol/week: 0.0 standard drinks of alcohol     Comment:  quit post stroke 7-2018    Drug use: No       Physical Exam  /82   Pulse 60   Wt 110.7 kg (244 lb)   SpO2 97%   BMI 33.09 kg/m    Body mass index is 33.09 kg/m .    Physical Exam    GENERAL :  In no apparent distress  SKIN: Normal color, normal temperature     EYES: EOMI, No scleral icterus  NECK: No visible masses  RESP: No respiratory distress, normal effort  CARDIO: regular rate  ABDOMEN: flat, nondistended       NEURO: awake, alert, responds  appropriately to questions   EXTREMITIES: No clubbing, cyanosis or edema.    I spent a total of 30 minutes on the day of this established patient visit on chart review, lab review, review of imaging, reviewing previous providers notes, exam and counseling of patient    Jonathon Contreras MD

## 2023-05-01 NOTE — NURSING NOTE
Chief Complaint   Patient presents with     RECHECK     Testosterone        Ashley Burgess, CMA

## 2023-05-03 LAB — TESTOST SERPL-MCNC: 299 NG/DL (ref 240–950)

## 2023-06-07 ENCOUNTER — LAB (OUTPATIENT)
Dept: LAB | Facility: CLINIC | Age: 33
End: 2023-06-07
Payer: COMMERCIAL

## 2023-06-07 ENCOUNTER — ANTICOAGULATION THERAPY VISIT (OUTPATIENT)
Dept: ANTICOAGULATION | Facility: CLINIC | Age: 33
End: 2023-06-07

## 2023-06-07 DIAGNOSIS — Z79.01 CHRONIC ANTICOAGULATION: ICD-10-CM

## 2023-06-07 DIAGNOSIS — D68.61 ANTIPHOSPHOLIPID ANTIBODY SYNDROME (H): Primary | ICD-10-CM

## 2023-06-07 DIAGNOSIS — Z86.711 HISTORY OF PULMONARY EMBOLISM: ICD-10-CM

## 2023-06-07 DIAGNOSIS — D68.61 ANTIPHOSPHOLIPID SYNDROME (H): ICD-10-CM

## 2023-06-07 DIAGNOSIS — D68.61 ANTIPHOSPHOLIPID ANTIBODY SYNDROME (H): ICD-10-CM

## 2023-06-07 DIAGNOSIS — Z86.73 HISTORY OF CVA (CEREBROVASCULAR ACCIDENT): ICD-10-CM

## 2023-06-07 LAB — FACT X ACT/NOR PPP CHRO: 28 % (ref 70–130)

## 2023-06-07 PROCEDURE — 85260 CLOT FACTOR X STUART-POWER: CPT

## 2023-06-07 PROCEDURE — 36415 COLL VENOUS BLD VENIPUNCTURE: CPT

## 2023-06-07 NOTE — PROGRESS NOTES
ANTICOAGULATION MANAGEMENT     Shahram Sitzman 33 year old male is on warfarin with therapeutic result. (Goal Chromogenic Factor X 40-20%)    Recent labs: (last 7 days)     06/07/23  0817   [id number] 28*       ASSESSMENT     Warfarin Lab Questionnaire    Warfarin Doses Last 7 Days      6/6/2023    11:12 AM   Dose in Tablet or Mg   TAB or MG? milligram (mg)     Pt Rptd Dose SUNDAY MONDAY TUESDAY WED THURS FRIDAY SATURDAY 6/6/2023  11:12 AM 12.5 12.5 12.5 15 12.5 12.5 15         6/6/2023   Warfarin Lab Questionnaire   Missed doses within past 14 days? No   Changes in diet or alcohol within past 14 days? No   Medication changes since last result? No   Injuries or illness since last result? No   New shortness of breath, severe headaches or sudden changes in vision since last result? No   Abnormal bleeding since last result? No   Upcoming surgery, procedure? No        Previous result: Therapeutic last 2(+) visits  Additional findings: None     PLAN     Recommended plan for no diet, medication or health factor changes affecting result    Dosing Instructions: Continue your current warfarin dose 15 mg every Wed/Sat and 12.5 mg all other days. with next Chromogenic Factor X in 6 weeks       Telephone call with Tone who verbalizes understanding and agrees to plan    Lab visit scheduled    Education provided:     Goal range and lab monitoring: goal range and significance of current result    Contact [phone] with any changes, questions or concerns.     Plan made per [facility] anticoagulation protocol

## 2023-07-19 ENCOUNTER — LAB (OUTPATIENT)
Dept: LAB | Facility: CLINIC | Age: 33
End: 2023-07-19
Payer: COMMERCIAL

## 2023-07-19 DIAGNOSIS — Z86.711 HISTORY OF PULMONARY EMBOLISM: ICD-10-CM

## 2023-07-19 DIAGNOSIS — D68.61 ANTIPHOSPHOLIPID SYNDROME (H): ICD-10-CM

## 2023-07-19 DIAGNOSIS — D68.61 ANTIPHOSPHOLIPID ANTIBODY SYNDROME (H): ICD-10-CM

## 2023-07-19 DIAGNOSIS — Z79.01 CHRONIC ANTICOAGULATION: ICD-10-CM

## 2023-07-19 PROCEDURE — 36415 COLL VENOUS BLD VENIPUNCTURE: CPT

## 2023-07-19 PROCEDURE — 85260 CLOT FACTOR X STUART-POWER: CPT

## 2023-07-20 ENCOUNTER — ANTICOAGULATION THERAPY VISIT (OUTPATIENT)
Dept: ANTICOAGULATION | Facility: CLINIC | Age: 33
End: 2023-07-20
Payer: COMMERCIAL

## 2023-07-20 DIAGNOSIS — D68.61 ANTIPHOSPHOLIPID ANTIBODY SYNDROME (H): Primary | ICD-10-CM

## 2023-07-20 DIAGNOSIS — Z86.711 HISTORY OF PULMONARY EMBOLISM: ICD-10-CM

## 2023-07-20 DIAGNOSIS — Z79.01 CHRONIC ANTICOAGULATION: ICD-10-CM

## 2023-07-20 DIAGNOSIS — Z86.73 HISTORY OF CVA (CEREBROVASCULAR ACCIDENT): ICD-10-CM

## 2023-07-20 DIAGNOSIS — D68.61 ANTIPHOSPHOLIPID SYNDROME (H): ICD-10-CM

## 2023-07-20 LAB — FACT X ACT/NOR PPP CHRO: 21 % (ref 70–130)

## 2023-07-20 NOTE — PROGRESS NOTES
ANTICOAGULATION MANAGEMENT     Tone Marley 33 year old male is on warfarin with therapeutic result. (Goal Chromogenic Factor X 40-20%)    Recent labs: (last 7 days)     07/19/23  0807   [id number] 21*       ASSESSMENT     Source(s): Chart Review and Patient/Caregiver Call       Warfarin doses taken: Warfarin taken as instructed    Diet: No new diet changes identified    Medication/supplement changes: None noted    New illness, injury, or hospitalization: No    Signs or symptoms of bleeding or clotting: No    Previous result: Therapeutic last 2(+) visits    Additional findings: None       PLAN     Recommended plan for no diet, medication or health factor changes affecting result    Dosing Instructions: Continue your current warfarin dose 15 mg every Wed and Sat; 12.5 mg all other days. with next Chromogenic Factor X in 6 weeks       Telephone call with Tone who verbalizes understanding and agrees to plan    Lab visit scheduled    Education provided:     Goal range and lab monitoring: goal range and significance of current result    Contact [phone] with any changes, questions or concerns.     Plan made per [facility] anticoagulation protocol

## 2023-08-30 ENCOUNTER — LAB (OUTPATIENT)
Dept: LAB | Facility: CLINIC | Age: 33
End: 2023-08-30
Payer: COMMERCIAL

## 2023-08-30 DIAGNOSIS — Z79.01 CHRONIC ANTICOAGULATION: ICD-10-CM

## 2023-08-30 DIAGNOSIS — D68.61 ANTIPHOSPHOLIPID ANTIBODY SYNDROME (H): ICD-10-CM

## 2023-08-30 DIAGNOSIS — Z86.711 HISTORY OF PULMONARY EMBOLISM: ICD-10-CM

## 2023-08-30 DIAGNOSIS — D68.61 ANTIPHOSPHOLIPID SYNDROME (H): ICD-10-CM

## 2023-08-30 PROCEDURE — 36415 COLL VENOUS BLD VENIPUNCTURE: CPT

## 2023-08-30 PROCEDURE — 85260 CLOT FACTOR X STUART-POWER: CPT

## 2023-09-01 ENCOUNTER — ANTICOAGULATION THERAPY VISIT (OUTPATIENT)
Dept: ANTICOAGULATION | Facility: CLINIC | Age: 33
End: 2023-09-01
Payer: COMMERCIAL

## 2023-09-01 DIAGNOSIS — Z86.711 HISTORY OF PULMONARY EMBOLISM: ICD-10-CM

## 2023-09-01 DIAGNOSIS — Z86.73 HISTORY OF CVA (CEREBROVASCULAR ACCIDENT): ICD-10-CM

## 2023-09-01 DIAGNOSIS — Z79.01 CHRONIC ANTICOAGULATION: ICD-10-CM

## 2023-09-01 DIAGNOSIS — D68.61 ANTIPHOSPHOLIPID ANTIBODY SYNDROME (H): Primary | ICD-10-CM

## 2023-09-01 DIAGNOSIS — D68.61 ANTIPHOSPHOLIPID SYNDROME (H): ICD-10-CM

## 2023-09-01 LAB — FACT X ACT/NOR PPP CHRO: 32 % (ref 70–130)

## 2023-09-01 NOTE — PROGRESS NOTES
ANTICOAGULATION MANAGEMENT     Tone Marley 33 year old male is on warfarin with therapeutic result. (Goal Chromogenic Factor X 40-20%)     Anticoagulation Monitoring    Chromogenic Factor 10   Latest Ref Rng 70 - 130 %   6/7/2023  8:17 AM 28 (L)    7/19/2023  8:07 AM 21 (L)    8/30/2023  8:04 AM 32 (L)       Legend:  (L) Low    ASSESSMENT     Source(s): Chart Review and Patient/Caregiver Call     Warfarin doses taken: Warfarin taken as instructed  Diet: No new diet changes identified  Medication/supplement changes: None noted  New illness, injury, or hospitalization: No  Signs or symptoms of bleeding or clotting: No  Previous result: Therapeutic last 2(+) visits  Additional findings: None     PLAN     Recommended plan for no diet, medication or health factor changes affecting result    Dosing Instructions: Continue your current warfarin dose 15mg We/Sa and 12.5mg AOD  with next Chromogenic Factor X in 6 weeks       Telephone call with Tone who verbalizes understanding and agrees to plan    Lab visit scheduled    Education provided:   Contact [phone] with any changes, questions or concerns.     Plan made per [facility] anticoagulation protocol    Maria Teresa Sierra RN  Anticoagulation Clinic  9/1/2023

## 2023-09-28 ENCOUNTER — LAB (OUTPATIENT)
Dept: LAB | Facility: CLINIC | Age: 33
End: 2023-09-28
Payer: COMMERCIAL

## 2023-09-28 DIAGNOSIS — D68.61 ANTIPHOSPHOLIPID SYNDROME (H): ICD-10-CM

## 2023-09-28 DIAGNOSIS — Z79.01 CHRONIC ANTICOAGULATION: ICD-10-CM

## 2023-09-28 DIAGNOSIS — R79.89 LOW SERUM TESTOSTERONE: ICD-10-CM

## 2023-09-28 DIAGNOSIS — D68.61 ANTIPHOSPHOLIPID ANTIBODY SYNDROME (H): ICD-10-CM

## 2023-09-28 DIAGNOSIS — Z86.711 HISTORY OF PULMONARY EMBOLISM: ICD-10-CM

## 2023-09-28 LAB
ALBUMIN SERPL BCG-MCNC: 4.5 G/DL (ref 3.5–5.2)
HCT VFR BLD AUTO: 48.4 % (ref 40–53)
HGB BLD-MCNC: 16 G/DL (ref 13.3–17.7)
PROLACTIN SERPL 3RD IS-MCNC: 55 NG/ML (ref 4–15)
SHBG SERPL-SCNC: 21 NMOL/L (ref 11–80)

## 2023-09-28 PROCEDURE — 36415 COLL VENOUS BLD VENIPUNCTURE: CPT

## 2023-09-28 PROCEDURE — 84146 ASSAY OF PROLACTIN: CPT

## 2023-09-28 PROCEDURE — 84270 ASSAY OF SEX HORMONE GLOBUL: CPT

## 2023-09-28 PROCEDURE — 85260 CLOT FACTOR X STUART-POWER: CPT

## 2023-09-28 PROCEDURE — 84403 ASSAY OF TOTAL TESTOSTERONE: CPT

## 2023-09-28 PROCEDURE — 82040 ASSAY OF SERUM ALBUMIN: CPT

## 2023-09-28 PROCEDURE — 85014 HEMATOCRIT: CPT

## 2023-09-28 PROCEDURE — 85018 HEMOGLOBIN: CPT

## 2023-09-29 ENCOUNTER — DOCUMENTATION ONLY (OUTPATIENT)
Dept: ANTICOAGULATION | Facility: CLINIC | Age: 33
End: 2023-09-29
Payer: COMMERCIAL

## 2023-09-29 ENCOUNTER — ANTICOAGULATION THERAPY VISIT (OUTPATIENT)
Dept: ANTICOAGULATION | Facility: CLINIC | Age: 33
End: 2023-09-29
Payer: COMMERCIAL

## 2023-09-29 DIAGNOSIS — D68.61 ANTIPHOSPHOLIPID ANTIBODY SYNDROME (H): Primary | ICD-10-CM

## 2023-09-29 DIAGNOSIS — Z79.01 CHRONIC ANTICOAGULATION: ICD-10-CM

## 2023-09-29 DIAGNOSIS — Z86.711 HISTORY OF PULMONARY EMBOLISM: ICD-10-CM

## 2023-09-29 DIAGNOSIS — Z86.73 HISTORY OF CVA (CEREBROVASCULAR ACCIDENT): ICD-10-CM

## 2023-09-29 DIAGNOSIS — D68.61 ANTIPHOSPHOLIPID SYNDROME (H): ICD-10-CM

## 2023-09-29 LAB — FACT X ACT/NOR PPP CHRO: 39 % (ref 70–130)

## 2023-09-29 NOTE — PROGRESS NOTES
ANTICOAGULATION MANAGEMENT     Tone Marley 33 year old male is on warfarin with therapeutic result. (Goal Chromogenic Factor X 40-20%)    Recent labs: (last 7 days)     09/28/23  0801   [id number] 39*       ASSESSMENT     Source(s): Chart Review and Patient/Caregiver Call     Warfarin doses taken: Warfarin taken as instructed  Diet: No new diet changes identified  Medication/supplement changes: None noted  New illness, injury, or hospitalization: No  Signs or symptoms of bleeding or clotting: No  Previous result: Therapeutic last 2(+) visits  Additional findings: None     PLAN     Recommended plan for no diet, medication or health factor changes affecting result    Dosing Instructions: Continue your current warfarin dose 15 mg every Wednesday and Saturday; 12.5 mg all other days;  with next Chromogenic Factor X in 6 weeks       Telephone call with Tone who verbalizes understanding and agrees to plan    Lab visit scheduled    Education provided:   Goal range and lab monitoring: goal range and significance of current result  Contact [phone] with any changes, questions or concerns.     Plan made per [facility] anticoagulation protocol    Abby Chatman RN  Anticoagulation Clinic  9/29/2023      
Speaking Coherently

## 2023-09-29 NOTE — PROGRESS NOTES
ANTICOAGULATION CLINIC REFERRAL RENEWAL REQUEST       An annual renewal order is required for all patients referred to [facility].?  Please review and sign the pended referral order for Tone Marley.       ANTICOAGULATION SUMMARY      Warfarin indication(s)   Stroke, PE, and Antiphospholipid Antibodies    Mechanical heart valve present?  NO       Current goal range   Chromogenic Factor X:  40-20%     Goal appropriate for indication? Chromogenic Factor X of 40-20% appropriate for indication(s) above     Time in Therapeutic Range (TTR)  (Goal > 60%) 100%       Office visit with referring provider's group within last year no on 1/11/2022       Abby Chatman RN  [facility]

## 2023-10-03 ENCOUNTER — VIRTUAL VISIT (OUTPATIENT)
Dept: ENDOCRINOLOGY | Facility: CLINIC | Age: 33
End: 2023-10-03
Payer: COMMERCIAL

## 2023-10-03 DIAGNOSIS — R79.89 LOW SERUM TESTOSTERONE: ICD-10-CM

## 2023-10-03 LAB — TESTOST SERPL-MCNC: 2121 NG/DL (ref 240–950)

## 2023-10-03 PROCEDURE — 99214 OFFICE O/P EST MOD 30 MIN: CPT | Mod: VID | Performed by: INTERNAL MEDICINE

## 2023-10-03 RX ORDER — TESTOSTERONE 1.62 MG/G
3 GEL TRANSDERMAL DAILY
Qty: 150 G | Refills: 5 | Status: SHIPPED | OUTPATIENT
Start: 2023-10-03 | End: 2024-04-15

## 2023-10-03 NOTE — PATIENT INSTRUCTIONS
Hold androgel for 1 week.  Then restart but at 3 pumps per day.  Repeat labs on 11/8 at [facility].     Schedule pituitary MRI when contacted by radiology.

## 2023-10-03 NOTE — PROGRESS NOTES
Tone is a 33 year old who is being evaluated via a billable video visit.      How would you like to obtain your AVS? MyChart  If the video visit is dropped, the invitation should be resent by: Text to cell phone: [phone]  Will anyone else be joining your video visit? No      Endocrine Consult note    Attending Assessment/Plan :     Low serum testosterone    Assessment:  -Had low-normal testosterone on 2 pumps/day androgel but appears to be supratherapeutic on 4 ppd.  Labs on topical are not always as consistent but this was fairly elevated so we will plan to lower dose and recheck  -discussed potentially going back to injections but doing weekly and subcutaneous if needed.  Would like to stay on androgel if possible  -unlikely to be causing hypogonadism but his PRL level did double from 25 -> 50 since last MRI so will repeat this and discussed starting low dose cabergoline if adenoma is now seen     Plan:  -hold androgel for 1 week  -restart at 3 pumps/day  -recheck labs in 1 month  -pituitary MRI - start cabergoline if adenoma is now visible     RTC 6 months with repeat labs 1-2 weeks ahead of visit.     I have independently reviewed and interpreted labs, imaging as indicated.      Chief complaint:  Tone is a 33 year old male seen in consultation for hypogonadism.       HISTORY OF PRESENT ILLNESS    Tone is a 33M who comes to clinic for management of hypogonadism.     Initial HPI:  He also has a hx of antiphospholipid syndrome and is on anticoagulation for this. This was diagnosed at age 13 after he had a DVT in the lower extremity and PE.  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      Prior to therapy with testosterone he was having low libido, low energy, and difficulty losing weight.     Elevated PRL level without galactorrhea or gynecomastia.  No adenoma seen on MRI in  2020 when level was around 25.  PRL level has since gone up to ~50 but no new clinical s/s of hyperprolactinemia at this time.     Today: doing well, continues on 4 ppd androgel.  Got a new puppy so has been able to be fairly active with her with improvement in his sx.     After last visit testo level was still low normal so we increased androgel dose to 4 ppd.  Almost all hypogonadal sx have since resolved but level did come back quite elevated.  Confirmed that this lab was drawn prior to application of androgel that day.      He has also been taking some supplements that are marketed to help with testosterone: fenugreek, ashwaganda, 600mg, long jessee (tongkat ali).     Endocrine relevant labs and/or imaging are as follows:  Pituitary MRI in 2020 showed no obvious pituitary lesions.     Component      Latest Ref Rng 5/1/2023  9:51 AM 9/28/2023  8:01 AM   Hemoglobin      13.3 - 17.7 g/dL  16.0    Hematocrit      40.0 - 53.0 %  48.4    Testosterone Total      240 - 950 ng/dL 299  2,121 (H)    Sex Hormone Binding Globulin      11 - 80 nmol/L  21    Albumin      3.5 - 5.2 g/dL  4.5    Prolactin      4 - 15 ng/mL  55 (H)       Legend:  (H) High  REVIEW OF SYSTEMS    10 system ROS otherwise as per the HPI or negative    Past Medical History  Past Medical History:   Diagnosis Date    ADHD (attention deficit hyperactivity disorder) 6/27/2012    Antiphospholipid antibody syndromes     at age 13    Antiphospholipid syndrome (H24) 2/2/2012    On anticoagulation coumdin 10 mg once daily      Cerebral infarction (H) 7/1/18    Vertebral artery dissection    History of pulmonary embolism 9/2/2013    Was diagnosed  Positive CT scan in emergency department   now Under care of Dr Turcios at [facility] at [facility],[id number] His nurse personal line [phone] Was on lovenox for 5 months  Now  For past one month on xareltlo 20 mg once daily and plaqunil twice daily      Hyperlipidemia LDL goal <130 6/11/2013    Hypertension goal BP  (blood pressure) < 140/90 6/19/12    Hypertension, uncontrolled 6/27/2012    Postphlebitic syndrome 12/9/2014    Prehypertension 2/2/2012    Pulmonary embolism (H)     at age 13    Pulmonary embolism (H) 1/11/2015       Medications  Current Outpatient Medications   Medication Sig Dispense Refill    ASPIRIN PO Take 81 mg by mouth daily      atorvastatin (LIPITOR) 10 MG tablet Take 1 tablet (10 mg) by mouth daily 90 tablet 3    hydrochlorothiazide (HYDRODIURIL) 25 MG tablet Take 1 tablet (25 mg) by mouth daily 90 tablet 3    losartan (COZAAR) 100 MG tablet Take 1 tablet (100 mg) by mouth daily 90 tablet 3    Omega-3 Fatty Acids (FISH OIL PO)       testosterone (ANDROGEL 1.62 % PUMP) 20.25 MG/ACT gel Place 3 Pump onto the skin daily Apply from dispenser to clean, dry, intact skin of the upper arms and shoulders. 150 g 5    warfarin ANTICOAGULANT (COUMADIN) 10 MG tablet Take by mouth 15mg on Wednesday & Saturday; 12.5 mg all other days of the week or as directed by the Anticoagulation Clinic. Pt has 5mg tablets of warfarin in addition to 10mg. 90 tablet 3    warfarin ANTICOAGULANT (COUMADIN) 5 MG tablet Take 1/2 tablet by mouth daily along with 10 mg tablet for a total of 12.5 mg daily. Adjust dose based on INR results as directed. 90 tablet 3    losartan (COZAAR) 25 MG tablet Take 50 mg by mouth daily (Patient not taking: Reported on 10/3/2023)         Allergies  Allergies   Allergen Reactions    Nkda [No Known Drug Allergy]          Family History  family history includes Asthma in his mother; C.A.D. (age of onset: 42) in his paternal uncle; Depression in his mother; Heart Surgery (age of onset: 60) in his father; Other Cancer in his maternal grandmother.    Social History  Social History     Tobacco Use    Smoking status: Never    Smokeless tobacco: Former     Types: Chew     Quit date: 07/2018    Tobacco comments:     Chew maybe once per week.   Vaping Use    Vaping Use: Never used   Substance Use Topics    Alcohol  use: Yes     Comment: occasional    Drug use: No       Physical Exam  There is no height or weight on file to calculate BMI.  GENERAL: no distress  SKIN: Visible skin clear. No significant rash, abnormal pigmentation or lesions.  EYES: Eyes grossly normal to inspection.  No discharge or erythema, or obvious scleral/conjunctival abnormalities.  NECK: visible goiter is not present; no visible neck masses  RESP: No audible wheeze, cough, or visible cyanosis.  No visible retractions or increased work of breathing.    NEURO: Awake, alert, responds appropriately to questions.  Mentation and speech fluent.  PSYCH:affect normal and appearance well-groomed.    Video-Visit Details    Type of service:  Video Visit    Video Start Time (time video started): 1100    Video End Time (time video stopped): 1122    Originating Location (pt. Location): Home      Distant Location (provider location):  Off-site    Mode of Communication:  Video Conference via [facility]    Physician has received verbal consent for a Video Visit from the patient? Yes    I spent a total of 37 minutes on the day of this established patient visit on chart review, lab review, review of imaging, coordinating care, exam, and counseling of patient    Jonathon Contreras MD

## 2023-10-03 NOTE — LETTER
10/3/2023         RE: Tone Marley  [address]  [facility] [address]        Dear Colleague,    Thank you for referring your patient, Tone Marley, to the [facility] ENDOCRINOLOGY. Please see a copy of my visit note below.    Tone is a 33 year old who is being evaluated via a billable video visit.      How would you like to obtain your AVS? MyChart  If the video visit is dropped, the invitation should be resent by: Text to cell phone: [phone]  Will anyone else be joining your video visit? No      Endocrine Consult note    Attending Assessment/Plan :     Low serum testosterone    Assessment:  -Had low-normal testosterone on 2 pumps/day androgel but appears to be supratherapeutic on 4 ppd.  Labs on topical are not always as consistent but this was fairly elevated so we will plan to lower dose and recheck  -discussed potentially going back to injections but doing weekly and subcutaneous if needed.  Would like to stay on androgel if possible  -unlikely to be causing hypogonadism but his PRL level did double from 25 -> 50 since last MRI so will repeat this and discussed starting low dose cabergoline if adenoma is now seen     Plan:  -hold androgel for 1 week  -restart at 3 pumps/day  -recheck labs in 1 month  -pituitary MRI - start cabergoline if adenoma is now visible     RTC 6 months with repeat labs 1-2 weeks ahead of visit.     I have independently reviewed and interpreted labs, imaging as indicated.      Chief complaint:  Tone is a 33 year old male seen in consultation for hypogonadism.       HISTORY OF PRESENT ILLNESS    Tone is a 33M who comes to clinic for management of hypogonadism.     Initial HPI:  He also has a hx of antiphospholipid syndrome and is on anticoagulation for this. This was diagnosed at age 13 after he had a DVT in the lower extremity and PE.  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      Prior to therapy with testosterone he was having low libido, low energy, and difficulty losing weight.     Elevated PRL level without galactorrhea or gynecomastia.  No adenoma seen on MRI in 2020 when level was around 25.  PRL level has since gone up to ~50 but no new clinical s/s of hyperprolactinemia at this time.     Today: doing well, continues on 4 ppd androgel.  Got a new puppy so has been able to be fairly active with her with improvement in his sx.     After last visit testo level was still low normal so we increased androgel dose to 4 ppd.  Almost all hypogonadal sx have since resolved but level did come back quite elevated.  Confirmed that this lab was drawn prior to application of androgel that day.      He has also been taking some supplements that are marketed to help with testosterone: fenugreek, ashwaganda, 600mg, long jessee (tongkat ali).     Endocrine relevant labs and/or imaging are as follows:  Pituitary MRI in 2020 showed no obvious pituitary lesions.     Component      Latest Ref Rng 5/1/2023  9:51 AM 9/28/2023  8:01 AM   Hemoglobin      13.3 - 17.7 g/dL  16.0    Hematocrit      40.0 - 53.0 %  48.4    Testosterone Total      240 - 950 ng/dL 299  2,121 (H)    Sex Hormone Binding Globulin      11 - 80 nmol/L  21    Albumin      3.5 - 5.2 g/dL  4.5    Prolactin      4 - 15 ng/mL  55 (H)       Legend:  (H) High  REVIEW OF SYSTEMS    10 system ROS otherwise as per the HPI or negative    Past Medical History  Past Medical History:   Diagnosis Date     ADHD (attention deficit hyperactivity disorder) 6/27/2012     Antiphospholipid antibody syndromes     at age 13     Antiphospholipid syndrome (H24) 2/2/2012    On anticoagulation coumdin 10 mg once daily       Cerebral infarction (H) 7/1/18    Vertebral artery dissection     History of pulmonary embolism 9/2/2013    Was diagnosed  Positive CT scan in emergency department    now Under care of Dr Turcios at [facility] at [facility],[id number] His nurse personal line [phone] Was on lovenox for 5 months  Now  For past one month on xareltlo 20 mg once daily and plaqunil twice daily       Hyperlipidemia LDL goal <130 6/11/2013     Hypertension goal BP (blood pressure) < 140/90 6/19/12     Hypertension, uncontrolled 6/27/2012     Postphlebitic syndrome 12/9/2014     Prehypertension 2/2/2012     Pulmonary embolism (H)     at age 13     Pulmonary embolism (H) 1/11/2015       Medications  Current Outpatient Medications   Medication Sig Dispense Refill     ASPIRIN PO Take 81 mg by mouth daily       atorvastatin (LIPITOR) 10 MG tablet Take 1 tablet (10 mg) by mouth daily 90 tablet 3     hydrochlorothiazide (HYDRODIURIL) 25 MG tablet Take 1 tablet (25 mg) by mouth daily 90 tablet 3     losartan (COZAAR) 100 MG tablet Take 1 tablet (100 mg) by mouth daily 90 tablet 3     Omega-3 Fatty Acids (FISH OIL PO)        testosterone (ANDROGEL 1.62 % PUMP) 20.25 MG/ACT gel Place 3 Pump onto the skin daily Apply from dispenser to clean, dry, intact skin of the upper arms and shoulders. 150 g 5     warfarin ANTICOAGULANT (COUMADIN) 10 MG tablet Take by mouth 15mg on Wednesday & Saturday; 12.5 mg all other days of the week or as directed by the Anticoagulation Clinic. Pt has 5mg tablets of warfarin in addition to 10mg. 90 tablet 3     warfarin ANTICOAGULANT (COUMADIN) 5 MG tablet Take 1/2 tablet by mouth daily along with 10 mg tablet for a total of 12.5 mg daily. Adjust dose based on INR results as directed. 90 tablet 3     losartan (COZAAR) 25 MG tablet Take 50 mg by mouth daily (Patient not taking: Reported on 10/3/2023)         Allergies  Allergies   Allergen Reactions     Nkda [No Known Drug Allergy]          Family History  family history includes Asthma in his mother; C.A.D. (age of onset: 42) in his paternal uncle; Depression in his mother; Heart Surgery (age of onset: 60) in his father; Other Cancer in  his maternal grandmother.    Social History  Social History     Tobacco Use     Smoking status: Never     Smokeless tobacco: Former     Types: Chew     Quit date: 07/2018     Tobacco comments:     Chew maybe once per week.   Vaping Use     Vaping Use: Never used   Substance Use Topics     Alcohol use: Yes     Comment: occasional     Drug use: No       Physical Exam  There is no height or weight on file to calculate BMI.  GENERAL: no distress  SKIN: Visible skin clear. No significant rash, abnormal pigmentation or lesions.  EYES: Eyes grossly normal to inspection.  No discharge or erythema, or obvious scleral/conjunctival abnormalities.  NECK: visible goiter is not present; no visible neck masses  RESP: No audible wheeze, cough, or visible cyanosis.  No visible retractions or increased work of breathing.    NEURO: Awake, alert, responds appropriately to questions.  Mentation and speech fluent.  PSYCH:affect normal and appearance well-groomed.    Video-Visit Details    Type of service:  Video Visit    Video Start Time (time video started): 1100    Video End Time (time video stopped): 1122    Originating Location (pt. Location): Home      Distant Location (provider location):  Off-site    Mode of Communication:  Video Conference via [facility]    Physician has received verbal consent for a Video Visit from the patient? Yes    I spent a total of 37 minutes on the day of this established patient visit on chart review, lab review, review of imaging, coordinating care, exam, and counseling of patient    Jonathon Contreras MD          Again, thank you for allowing me to participate in the care of your patient.        Sincerely,        Jonathon Contreras MD

## 2023-10-03 NOTE — Clinical Note
Please schedule for followup in 6 months with any endo provider who sees pituitary and low testosterone

## 2023-10-04 ENCOUNTER — TELEPHONE (OUTPATIENT)
Dept: ENDOCRINOLOGY | Facility: CLINIC | Age: 33
End: 2023-10-04
Payer: COMMERCIAL

## 2023-10-04 NOTE — TELEPHONE ENCOUNTER
Patient call:     Appointment type: return endo  Provider: Any   Return date: 6 month f/u 4/4/24  Speciality phone number: [phone]  Additional appointment(s) needed: N/A  Additional notes: Dr. Contreras Pt needs to be scheduled with a new provider.   LVM, MyC x1     Yuni Hernandez on 10/4/2023 at 2:13 PM

## 2023-10-14 ENCOUNTER — HOSPITAL ENCOUNTER (OUTPATIENT)
Dept: MRI IMAGING | Facility: CLINIC | Age: 33
Discharge: HOME | End: 2023-10-14
Attending: INTERNAL MEDICINE | Admitting: INTERNAL MEDICINE
Payer: COMMERCIAL

## 2023-10-14 DIAGNOSIS — R79.89 LOW SERUM TESTOSTERONE: ICD-10-CM

## 2023-10-14 PROCEDURE — 255N000002 HC RX 255 OP 636: Performed by: INTERNAL MEDICINE

## 2023-10-14 PROCEDURE — A9585 GADOBUTROL INJECTION: HCPCS | Performed by: INTERNAL MEDICINE

## 2023-10-14 PROCEDURE — 70553 MRI BRAIN STEM W/O & W/DYE: CPT

## 2023-10-14 RX ORDER — GADOBUTROL 604.72 MG/ML
10 INJECTION INTRAVENOUS ONCE
Status: COMPLETED | OUTPATIENT
Start: 2023-10-14 | End: 2023-10-14

## 2023-10-14 RX ADMIN — GADOBUTROL 10 ML: 604.72 INJECTION INTRAVENOUS at 08:24

## 2023-10-16 DIAGNOSIS — E22.1 HYPERPROLACTINEMIA (H): Primary | ICD-10-CM

## 2023-11-08 ENCOUNTER — LAB (OUTPATIENT)
Dept: LAB | Facility: CLINIC | Age: 33
End: 2023-11-08
Payer: COMMERCIAL

## 2023-11-08 ENCOUNTER — ANTICOAGULATION THERAPY VISIT (OUTPATIENT)
Dept: ANTICOAGULATION | Facility: CLINIC | Age: 33
End: 2023-11-08

## 2023-11-08 DIAGNOSIS — D68.61 ANTIPHOSPHOLIPID SYNDROME (H): ICD-10-CM

## 2023-11-08 DIAGNOSIS — Z86.711 HISTORY OF PULMONARY EMBOLISM: ICD-10-CM

## 2023-11-08 DIAGNOSIS — Z86.73 HISTORY OF CVA (CEREBROVASCULAR ACCIDENT): ICD-10-CM

## 2023-11-08 DIAGNOSIS — D68.61 ANTIPHOSPHOLIPID ANTIBODY SYNDROME (H): Primary | ICD-10-CM

## 2023-11-08 DIAGNOSIS — Z79.01 CHRONIC ANTICOAGULATION: ICD-10-CM

## 2023-11-08 DIAGNOSIS — R79.89 LOW SERUM TESTOSTERONE: ICD-10-CM

## 2023-11-08 DIAGNOSIS — E22.1 HYPERPROLACTINEMIA (H): ICD-10-CM

## 2023-11-08 DIAGNOSIS — D68.61 ANTIPHOSPHOLIPID ANTIBODY SYNDROME (H): ICD-10-CM

## 2023-11-08 LAB
FACT X ACT/NOR PPP CHRO: 34 % (ref 70–130)
T4 FREE SERPL-MCNC: 1.3 NG/DL (ref 0.9–1.7)
TSH SERPL DL<=0.005 MIU/L-ACNC: 5.08 UIU/ML (ref 0.3–4.2)

## 2023-11-08 PROCEDURE — 85260 CLOT FACTOR X STUART-POWER: CPT

## 2023-11-08 PROCEDURE — 84439 ASSAY OF FREE THYROXINE: CPT

## 2023-11-08 PROCEDURE — 36415 COLL VENOUS BLD VENIPUNCTURE: CPT

## 2023-11-08 PROCEDURE — 84443 ASSAY THYROID STIM HORMONE: CPT

## 2023-11-08 PROCEDURE — 84403 ASSAY OF TOTAL TESTOSTERONE: CPT

## 2023-11-08 NOTE — PROGRESS NOTES
ANTICOAGULATION MANAGEMENT     Tone Marley 33 year old male is on warfarin with therapeutic result. (Goal Chromogenic Factor X 40-20%)     Anticoagulation Monitoring    Chromogenic Factor 10   Latest Ref Rng 70 - 130 %   8/30/2023  8:04 AM 32 (L)    9/28/2023  8:01 AM 39 (L)    11/8/2023  8:04 AM 34 (L)       Legend:  (L) Low      ASSESSMENT     Source(s): Chart Review  Previous result was Therapeutic last 2(+) visits  Medication, diet, health changes since last result: chart reviewed; none identified       PLAN     Recommended plan for no diet, medication or health factor changes affecting result    Dosing Instructions: Continue your current warfarin dose 15mg Wednesday and Saturday and 12.5mg AOD  with next Chromogenic Factor X in 6 weeks       Detailed voice message left for Tone with dosing instructions and follow up date.     Contact [phone] to schedule and with any changes, questions or concerns.     Education provided:   Please call back if any changes to your diet, medications or how you've been taking warfarin  Goal range and lab monitoring: goal range and significance of current result    Plan made per [facility] anticoagulation protocol    Maria Teresa Sierra, RN  Anticoagulation Clinic  11/8/2023

## 2023-11-10 LAB — TESTOST SERPL-MCNC: 505 NG/DL (ref 240–950)

## 2023-11-13 DIAGNOSIS — E03.9 HYPOTHYROIDISM, UNSPECIFIED TYPE: ICD-10-CM

## 2023-11-13 DIAGNOSIS — E22.1 HYPERPROLACTINEMIA (H): Primary | ICD-10-CM

## 2023-11-13 RX ORDER — LEVOTHYROXINE SODIUM 50 UG/1
50 TABLET ORAL DAILY
Qty: 90 TABLET | Refills: 1 | Status: SHIPPED | OUTPATIENT
Start: 2023-11-13 | End: 2024-04-23

## 2023-12-13 ENCOUNTER — ANTICOAGULATION THERAPY VISIT (OUTPATIENT)
Dept: ANTICOAGULATION | Facility: CLINIC | Age: 33
End: 2023-12-13

## 2023-12-13 ENCOUNTER — LAB (OUTPATIENT)
Dept: LAB | Facility: CLINIC | Age: 33
End: 2023-12-13
Payer: COMMERCIAL

## 2023-12-13 DIAGNOSIS — Z86.73 HISTORY OF CVA (CEREBROVASCULAR ACCIDENT): ICD-10-CM

## 2023-12-13 DIAGNOSIS — D68.61 ANTIPHOSPHOLIPID ANTIBODY SYNDROME (H): ICD-10-CM

## 2023-12-13 DIAGNOSIS — D68.61 ANTIPHOSPHOLIPID ANTIBODY SYNDROME (H): Primary | ICD-10-CM

## 2023-12-13 DIAGNOSIS — Z86.711 HISTORY OF PULMONARY EMBOLISM: ICD-10-CM

## 2023-12-13 DIAGNOSIS — Z79.01 CHRONIC ANTICOAGULATION: ICD-10-CM

## 2023-12-13 DIAGNOSIS — D68.61 ANTIPHOSPHOLIPID SYNDROME (H): ICD-10-CM

## 2023-12-13 LAB — FACT X ACT/NOR PPP CHRO: 20 % (ref 70–130)

## 2023-12-13 PROCEDURE — 36415 COLL VENOUS BLD VENIPUNCTURE: CPT

## 2023-12-13 PROCEDURE — 85260 CLOT FACTOR X STUART-POWER: CPT

## 2023-12-13 NOTE — PROGRESS NOTES
ANTICOAGULATION MANAGEMENT     Tone Marley 33 year old male is on warfarin with therapeutic result. (Goal Chromogenic Factor X 40-20%)    Recent labs: (last 7 days)     12/13/23  0750   [id number] 20*       ASSESSMENT     Warfarin Lab Questionnaire    Warfarin Doses Last 7 Days      12/12/2023     9:25 AM   Dose in Tablet or Mg   TAB or MG? milligram (mg)     Pt Rptd Dose SUNDAY MONDAY TUESDAY WED THURS FRIDAY SATURDAY 12/12/2023   9:25 AM 12.5 12.5 12.5 15 12.5 12.5 15         12/12/2023   Warfarin Lab Questionnaire   Missed doses within past 14 days? No   Changes in diet or alcohol within past 14 days? No   Medication changes since last result? Yes   Please list: Levothyroxine added 2 weeks ago, has minor interaction to increase bleeding risk   Injuries or illness since last result? No   New shortness of breath, severe headaches or sudden changes in vision since last result? No   Abnormal bleeding since last result? No   Upcoming surgery, procedure? No     Previous result: Therapeutic last 2(+) visits  Additional findings: None     PLAN     Recommended plan for ongoing change(s) affecting result    Dosing Instructions: Continue your current warfarin dose 15mg Wednesday and Saturday and 12.5mg all other days  with next Chromogenic Factor X in 3 weeks       Telephone call with Tone who verbalizes understanding and agrees to plan    Lab visit scheduled    Education provided:   Goal range and lab monitoring: goal range and significance of current result  Contact [phone] with any changes, questions or concerns.     Plan made per [facility] anticoagulation protocol    Jaja Oliveira RN  Anticoagulation Clinic  12/13/2023

## 2024-01-10 ENCOUNTER — LAB (OUTPATIENT)
Dept: LAB | Facility: CLINIC | Age: 34
End: 2024-01-10
Payer: COMMERCIAL

## 2024-01-10 DIAGNOSIS — Z86.711 HISTORY OF PULMONARY EMBOLISM: ICD-10-CM

## 2024-01-10 DIAGNOSIS — E03.9 HYPOTHYROIDISM, UNSPECIFIED TYPE: ICD-10-CM

## 2024-01-10 DIAGNOSIS — D68.61 ANTIPHOSPHOLIPID ANTIBODY SYNDROME (H): ICD-10-CM

## 2024-01-10 DIAGNOSIS — Z79.01 CHRONIC ANTICOAGULATION: ICD-10-CM

## 2024-01-10 DIAGNOSIS — E22.1 HYPERPROLACTINEMIA (H): ICD-10-CM

## 2024-01-10 DIAGNOSIS — Z86.73 HISTORY OF CVA (CEREBROVASCULAR ACCIDENT): ICD-10-CM

## 2024-01-10 PROCEDURE — 84146 ASSAY OF PROLACTIN: CPT

## 2024-01-10 PROCEDURE — 84443 ASSAY THYROID STIM HORMONE: CPT

## 2024-01-10 PROCEDURE — 36415 COLL VENOUS BLD VENIPUNCTURE: CPT

## 2024-01-10 PROCEDURE — 85260 CLOT FACTOR X STUART-POWER: CPT

## 2024-01-11 ENCOUNTER — ANTICOAGULATION THERAPY VISIT (OUTPATIENT)
Dept: ANTICOAGULATION | Facility: CLINIC | Age: 34
End: 2024-01-11
Payer: COMMERCIAL

## 2024-01-11 DIAGNOSIS — Z79.01 CHRONIC ANTICOAGULATION: ICD-10-CM

## 2024-01-11 DIAGNOSIS — D68.61 ANTIPHOSPHOLIPID SYNDROME (H): ICD-10-CM

## 2024-01-11 DIAGNOSIS — D68.61 ANTIPHOSPHOLIPID ANTIBODY SYNDROME (H): Primary | ICD-10-CM

## 2024-01-11 DIAGNOSIS — Z86.73 HISTORY OF CVA (CEREBROVASCULAR ACCIDENT): ICD-10-CM

## 2024-01-11 DIAGNOSIS — Z86.711 HISTORY OF PULMONARY EMBOLISM: ICD-10-CM

## 2024-01-11 LAB
FACT X ACT/NOR PPP CHRO: 32 % (ref 70–130)
PROLACTIN SERPL 3RD IS-MCNC: 70 NG/ML (ref 4–15)
TSH SERPL DL<=0.005 MIU/L-ACNC: 1.98 UIU/ML (ref 0.3–4.2)

## 2024-01-11 NOTE — PROGRESS NOTES
"ANTICOAGULATION MANAGEMENT     Tone PARDO Donell 33 year old male is on warfarin with therapeutic result. (Goal Chromogenic Factor X 40-20%)    Recent labs: (last 7 days)     01/10/24  1310   [id number]*       ASSESSMENT     Warfarin Lab Questionnaire    Warfarin Doses Last 7 Days      1/10/2024    10:38 AM   Dose in Tablet or Mg   TAB or MG? milligram (mg)     Pt Rptd Dose CRISTIAN MONDAY TUESDAY WED THURS FRIDAY SATURDAY   1/10/2024  10:38 AM 12.5 12.5 12.5 15 12.5 12.5 15         1/10/2024   Warfarin Lab Questionnaire   Missed doses within past 14 days? Yes   If yes; please list when: Saturday (1/6)-- intentionally skipped dose due to second episode of coughing up blood   Changes in diet or alcohol within past 14 days? No   Medication changes since last result? No   Injuries or illness since last result? No   New shortness of breath, severe headaches or sudden changes in vision since last result? No   Abnormal bleeding since last result? Yes   If yes, please explain: coughed up blood for about 30 minutes on 12/27 and 1/6-- reports that for the past 5 years or so, at least 2 times per winter, he has episodes where he coughs up small amount of blood, usually when it is very cold and he breathes through his mouth; reports he has been hospitalized for this before when he coughed too hard to try and clear his throat, but now has a specific way he sits and calms himself to prevent it for getting to that point. He hasn't been seen by PCP for this as it is \"just a personal anecdote that I wanted you to know\".   Upcoming surgery, procedure? No   Best number to call with results? [phone]     Previous result: Therapeutic last 2(+) visits  Additional findings: None     PLAN     Recommended plan for no diet, medication or health factor changes affecting result    Dosing Instructions: Continue your current warfarin dose 15mg We/Sa, 12.5mg AOD  with next Chromogenic Factor X in 5 weeks       Telephone call with Tone" who verbalizes understanding and agrees to plan    Patient elected to schedule next visit for 4 weeks    Education provided:   Symptom monitoring: monitoring for bleeding signs and symptoms and when to seek medical attention/emergency care (as well as contacting PCP for OV if this becomes more of an ongoing pattern/issue)  Contact [phone] with any changes, questions or concerns.     Plan made per [facility] anticoagulation protocol    Maria Teresa Sierra RN  Anticoagulation Clinic  1/11/2024

## 2024-02-07 ENCOUNTER — ANTICOAGULATION THERAPY VISIT (OUTPATIENT)
Dept: ANTICOAGULATION | Facility: CLINIC | Age: 34
End: 2024-02-07

## 2024-02-07 ENCOUNTER — LAB (OUTPATIENT)
Dept: LAB | Facility: CLINIC | Age: 34
End: 2024-02-07
Payer: COMMERCIAL

## 2024-02-07 DIAGNOSIS — Z86.711 HISTORY OF PULMONARY EMBOLISM: ICD-10-CM

## 2024-02-07 DIAGNOSIS — D68.61 ANTIPHOSPHOLIPID ANTIBODY SYNDROME (H): Primary | ICD-10-CM

## 2024-02-07 DIAGNOSIS — D68.61 ANTIPHOSPHOLIPID SYNDROME (H): ICD-10-CM

## 2024-02-07 DIAGNOSIS — D68.61 ANTIPHOSPHOLIPID ANTIBODY SYNDROME (H): ICD-10-CM

## 2024-02-07 DIAGNOSIS — Z79.01 CHRONIC ANTICOAGULATION: ICD-10-CM

## 2024-02-07 DIAGNOSIS — Z86.73 HISTORY OF CVA (CEREBROVASCULAR ACCIDENT): ICD-10-CM

## 2024-02-07 LAB — FACT X ACT/NOR PPP CHRO: 41 % (ref 70–130)

## 2024-02-07 PROCEDURE — 36415 COLL VENOUS BLD VENIPUNCTURE: CPT

## 2024-02-07 PROCEDURE — 85260 CLOT FACTOR X STUART-POWER: CPT

## 2024-02-07 NOTE — PROGRESS NOTES
ANTICOAGULATION MANAGEMENT     Tone Marley 33 year old male is on warfarin with subtherapeutic result. (Goal Chromogenic Factor X 40-20%)    Recent labs: (last 7 days)     02/07/24  0818   [id number] 41*       ASSESSMENT     Warfarin Lab Questionnaire    Warfarin Doses Last 7 Days      2/6/2024     9:16 AM   Dose in Tablet or Mg   TAB or MG? milligram (mg)     Pt Rptd Dose SUNDAY MONDAY TUESDAY WED THURS FRIDAY SATURDAY 2/6/2024   9:16 AM 12.5 12.5 12.5 15 12.5 12.5 15         2/6/2024   Warfarin Lab Questionnaire   Missed doses within past 14 days? Yes   If yes; please list when: Friday, 2/2   Changes in diet or alcohol within past 14 days? No   Medication changes since last result? Yes   Please list: stopped taking levothyroxine-stopped 5 weeks ago   Injuries or illness since last result? No   New shortness of breath, severe headaches or sudden changes in vision since last result? No   Abnormal bleeding since last result? No   Upcoming surgery, procedure? No     Previous result: Therapeutic last 2(+) visits  Additional findings: No indication of readings changing with Levothyroxine in the past. Because is his scheduled higher dose of 15 mg, we will not do a boost dose.     PLAN     Recommended plan for temporary change(s) affecting result    Dosing Instructions: Continue your current warfarin dose 15 mg Wednesday and Saturday, 12.5 mg all other days  with next Chromogenic Factor X in 10-14 days (2/21/24 or sooner)    Telephone call with Tone who verbalizes understanding and agrees to plan  Sent Zadego message with dosing and follow up instructions    Lab visit scheduled    Education provided:   Goal range and lab monitoring: goal range and significance of current result  Written instructions provided  Contact [phone] with any changes, questions or concerns.     Plan made with [employer] Pharmacist Priscila Chatman, RN  Anticoagulation Clinic  2/7/2024

## 2024-02-14 NOTE — TELEPHONE ENCOUNTER
Anticoagulation Monitoring Factor X   Latest Ref Rng & Units 70 - 130 %   5/19/2020 23 (L)   5/5/2020 5/4/2020 23 (L)   4/21/2020 4/21/2020 4/20/2020 23 (L)   4/7/2020 4/6/2020 31 (L)   3/24/2020    3/23/2020 38 (L)   3/9/2020 31 (L)   3/9/2020    3/2/2020 28 (L)   3/2/2020    2/25/2020    2/24/2020 28 (L)       ANTICOAGULATION  MANAGEMENT    Assessment     Therapeutic Factor 10 result of 23% . Goal range 20-40%. Received result via Clinic Lab.       Plan     Patient will continue current dose of coumadin as patient's Factor 10 result is therapeutic.  Patient will recheck Factor 10 in 2 weeks (his preference). Lab appt scheduled.?       OBJECTIVE    INR   Date Value Ref Range Status   04/20/2020 4.97 (H) 0.86 - 1.14 Corrected     Comment:     NOTIFIED DR ALANA SHARPE @2145 04.20.20 [facility]. BY EK  CORRECTED ON 04/20 AT 2146: PREVIOUSLY REPORTED AS 8.09 Critical Value called   to and read back by DR LEISA ESTRELLA @2125 04.20.20 [facility]. BY EK       Chromogenic Factor 10   Date Value Ref Range Status   05/19/2020 23 (L) 70 - 130 % Final     Comment:     Therapeutic Range:  A Chromogenic Factor 10 level of approximately 20-40%   inversely correlates with an INR of 2-3 for patients receiving Warfarin.   Chromogenic Factor 10 levels below 20% indicate an INR greater than 3 and   levels above 40% indicate an INR less than 2.       Factor 2 Assay   Date Value Ref Range Status   02/13/2012 36 (L) 60 - 140 % Final       ASSESSMENT / PLAN             167.64

## 2024-02-19 ENCOUNTER — LAB (OUTPATIENT)
Dept: LAB | Facility: CLINIC | Age: 34
End: 2024-02-19
Payer: COMMERCIAL

## 2024-02-19 ENCOUNTER — ANTICOAGULATION THERAPY VISIT (OUTPATIENT)
Dept: ANTICOAGULATION | Facility: CLINIC | Age: 34
End: 2024-02-19

## 2024-02-19 DIAGNOSIS — Z86.711 HISTORY OF PULMONARY EMBOLISM: ICD-10-CM

## 2024-02-19 DIAGNOSIS — Z86.73 HISTORY OF CVA (CEREBROVASCULAR ACCIDENT): ICD-10-CM

## 2024-02-19 DIAGNOSIS — Z79.01 CHRONIC ANTICOAGULATION: ICD-10-CM

## 2024-02-19 DIAGNOSIS — D68.61 ANTIPHOSPHOLIPID SYNDROME (H): ICD-10-CM

## 2024-02-19 DIAGNOSIS — D68.61 ANTIPHOSPHOLIPID ANTIBODY SYNDROME (H): Primary | ICD-10-CM

## 2024-02-19 DIAGNOSIS — D68.61 ANTIPHOSPHOLIPID ANTIBODY SYNDROME (H): ICD-10-CM

## 2024-02-19 LAB — FACT X ACT/NOR PPP CHRO: 29 % (ref 70–130)

## 2024-02-19 PROCEDURE — 36415 COLL VENOUS BLD VENIPUNCTURE: CPT

## 2024-02-19 PROCEDURE — 85260 CLOT FACTOR X STUART-POWER: CPT

## 2024-02-19 NOTE — PROGRESS NOTES
ANTICOAGULATION MANAGEMENT     Tone Marley 33 year old male is on warfarin with therapeutic result. (Goal Chromogenic Factor X 40-20%)    Recent labs: (last 7 days)     02/19/24  0915   [id number] 29*       ASSESSMENT     Warfarin Lab Questionnaire    Warfarin Doses Last 7 Days      2/18/2024     7:19 PM   Dose in Tablet or Mg   TAB or MG? milligram (mg)     Pt Rptd Dose SUNDAY MONDAY TUESDAY WED THURS FRIDAY SATURDAY 2/18/2024   7:19 PM 12.5 12.5 12.5 15 12.5 12.5 15         2/18/2024   Warfarin Lab Questionnaire   Missed doses within past 14 days? No   Changes in diet or alcohol within past 14 days? No   Medication changes since last result? No   Injuries or illness since last result? No   New shortness of breath, severe headaches or sudden changes in vision since last result? No   Abnormal bleeding since last result? No   Upcoming surgery, procedure? No     Previous result: Subtherapeutic  Additional findings: None     PLAN     Recommended plan for no diet, medication or health factor changes affecting result    Dosing Instructions: 5 mg every Wed, Sat; 12.5 mg all other days  with next Chromogenic Factor X in 6 weeks       Telephone call with Tone who verbalizes understanding and agrees to plan    Lab visit scheduled    Education provided:   Please call back if any changes to your diet, medications or how you've been taking warfarin    Plan made per [facility] anticoagulation protocol    Jyotsna Baker, RN  Anticoagulation Clinic  2/19/2024

## 2024-02-23 DIAGNOSIS — Z86.73 HISTORY OF CVA (CEREBROVASCULAR ACCIDENT): ICD-10-CM

## 2024-02-23 DIAGNOSIS — Z86.711 HISTORY OF PULMONARY EMBOLISM: ICD-10-CM

## 2024-02-23 DIAGNOSIS — D68.61 ANTIPHOSPHOLIPID ANTIBODY SYNDROME (H): ICD-10-CM

## 2024-02-23 DIAGNOSIS — Z79.01 CHRONIC ANTICOAGULATION: ICD-10-CM

## 2024-02-23 RX ORDER — WARFARIN SODIUM 10 MG/1
TABLET ORAL
Qty: 90 TABLET | Refills: 0 | Status: SHIPPED | OUTPATIENT
Start: 2024-02-23 | End: 2024-04-23

## 2024-02-23 NOTE — TELEPHONE ENCOUNTER
LAST APPT WAS ON 1/11/22 (OVER TWO YEARS AGO)       ANTICOAGULATION MANAGEMENT:  Medication Refill    Anticoagulation Summary  As of 2/19/2024      Warfarin maintenance plan:  15 mg (5 mg x 1 and 10 mg x 1) every Wed, Sat; 12.5 mg (5 mg x 0.5 and 10 mg x 1) all other days   Next INR check:  3/18/2024   Target end date:  Indefinite    Indications    Antiphospholipid antibody syndrome (H24) [D68.61]  Chronic anticoagulation [Z79.01]  Antiphospholipid syndrome (H24) [D68.61]  History of pulmonary embolism [Z86.711]  History of CVA (cerebrovascular accident) [Z86.73]                 Anticoagulation Care Providers       Provider Role Specialty Phone number    Yara Faust MD Referring Family Medicine [phone]            Refill Criteria    Visit with referring provider/group: Overdue for referring provider visit, last visit on 1/11/22    [facility] referral signed last signed: 10/06/2023; within last year: Yes    Lab monitoring not exceeding 2 weeks overdue: Yes    Tone does NOT meet all criteria for refill: Visit with referring provider's group was >= 2 year ago. 90 day quinn fill approved; patient notified to schedule with referring provider per [facility] protocol    Chetna Herron RN  Anticoagulation Clinic

## 2024-04-01 ENCOUNTER — TELEPHONE (OUTPATIENT)
Dept: ENDOCRINOLOGY | Facility: CLINIC | Age: 34
End: 2024-04-01
Payer: COMMERCIAL

## 2024-04-01 NOTE — TELEPHONE ENCOUNTER
Prior Authorization Specialty Medication Request    Medication/Dose: testosterone (ANDROGEL 1.62 % PUMP) 20.25 MG/ACT gel     Diagnosis and ICD code (if different than what is on RX):    New/renewal/insurance change PA/secondary ins. PA:  Previously Tried and Failed:      Important Lab Values: Testosterone total  Order: [id number]   Status: Final result      Visible to patient: Yes (seen)      Dx: Low serum testosterone      0 Result Notes     1 Patient Communication               Component  Ref Range & Units 4 mo ago  (11/8/23) 6 mo ago  (9/28/23) 11 mo ago  (5/1/23) 11 mo ago  (4/26/23) 1 yr ago  (12/27/22) 1 yr ago  (7/13/22) 2 yr ago  (7/27/21)    Testosterone Total  240 - 950 ng/dL 505                 Rationale: Long term stable on medication     Insurance   Primary:   Insurance ID:      Secondary (if applicable):  Insurance ID:      Pharmacy Information (if different than what is on RX)  Name:    Phone:    Fax:

## 2024-04-03 ENCOUNTER — ANTICOAGULATION THERAPY VISIT (OUTPATIENT)
Dept: ANTICOAGULATION | Facility: CLINIC | Age: 34
End: 2024-04-03

## 2024-04-03 ENCOUNTER — LAB (OUTPATIENT)
Dept: LAB | Facility: CLINIC | Age: 34
End: 2024-04-03
Payer: COMMERCIAL

## 2024-04-03 DIAGNOSIS — Z79.01 CHRONIC ANTICOAGULATION: ICD-10-CM

## 2024-04-03 DIAGNOSIS — D68.61 ANTIPHOSPHOLIPID SYNDROME (H): ICD-10-CM

## 2024-04-03 DIAGNOSIS — D68.61 ANTIPHOSPHOLIPID ANTIBODY SYNDROME (H): Primary | ICD-10-CM

## 2024-04-03 DIAGNOSIS — Z86.73 HISTORY OF CVA (CEREBROVASCULAR ACCIDENT): ICD-10-CM

## 2024-04-03 DIAGNOSIS — Z86.711 HISTORY OF PULMONARY EMBOLISM: ICD-10-CM

## 2024-04-03 DIAGNOSIS — D68.61 ANTIPHOSPHOLIPID ANTIBODY SYNDROME (H): ICD-10-CM

## 2024-04-03 DIAGNOSIS — R79.89 LOW SERUM TESTOSTERONE: ICD-10-CM

## 2024-04-03 LAB — FACT X ACT/NOR PPP CHRO: 30 % (ref 70–130)

## 2024-04-03 PROCEDURE — 84403 ASSAY OF TOTAL TESTOSTERONE: CPT

## 2024-04-03 PROCEDURE — 85260 CLOT FACTOR X STUART-POWER: CPT

## 2024-04-03 PROCEDURE — 36415 COLL VENOUS BLD VENIPUNCTURE: CPT

## 2024-04-03 RX ORDER — WARFARIN SODIUM 5 MG/1
TABLET ORAL
Qty: 90 TABLET | Refills: 3 | OUTPATIENT
Start: 2024-04-03

## 2024-04-03 NOTE — TELEPHONE ENCOUNTER
Patient said he has enough Warfarin 5mg and 10mg tabs at this time.  He is overdue to see PCP and patient said he would call to schedule right away.  He said he would contact the pharmacy when he needs refills and by that point he will have an appointment at least scheduled.  Refill denied at this time.  Rico ALMANZA

## 2024-04-04 ENCOUNTER — VIRTUAL VISIT (OUTPATIENT)
Dept: ENDOCRINOLOGY | Facility: CLINIC | Age: 34
End: 2024-04-04
Payer: COMMERCIAL

## 2024-04-04 VITALS — WEIGHT: 245 LBS | BODY MASS INDEX: 33.23 KG/M2

## 2024-04-04 DIAGNOSIS — R79.89 LOW SERUM TESTOSTERONE: ICD-10-CM

## 2024-04-04 DIAGNOSIS — E22.1 HYPERPROLACTINEMIA (H): Primary | ICD-10-CM

## 2024-04-04 DIAGNOSIS — E03.9 HYPOTHYROIDISM, UNSPECIFIED TYPE: ICD-10-CM

## 2024-04-04 PROCEDURE — 99417 PROLNG OP E/M EACH 15 MIN: CPT | Performed by: STUDENT IN AN ORGANIZED HEALTH CARE EDUCATION/TRAINING PROGRAM

## 2024-04-04 PROCEDURE — 99215 OFFICE O/P EST HI 40 MIN: CPT | Mod: 95 | Performed by: STUDENT IN AN ORGANIZED HEALTH CARE EDUCATION/TRAINING PROGRAM

## 2024-04-04 PROCEDURE — G2211 COMPLEX E/M VISIT ADD ON: HCPCS | Mod: 95 | Performed by: STUDENT IN AN ORGANIZED HEALTH CARE EDUCATION/TRAINING PROGRAM

## 2024-04-04 NOTE — PROGRESS NOTES
Endocrinology Clinic Visit 4/4/2024      Video-Visit Details    Type of service:  Video Visit  Joined the call at 4/4/2024, 9:06:11 am.  Left the call at 4/4/2024, 9:3748 am.      Originating Location (pt. Location): Home        Distant Location (provider location):  Off-site    Mode of Communication:  Video Conference via [employer]    Physician has received verbal consent for a Video Visit from the patient? Yes    I spent a total of 63 minutes on the date of encounter reviewing medical records, evaluating the patient, coordinating care and documenting in the EHR, as detailed above.      NAME:  Tone Marley  PCP:  GovindYara  MRN:  [id number]  Reason for Consult: Hypogonadism  Requesting Provider:  No ref. provider found    Chief Complaint     Chief Complaint   Patient presents with    RECHECK    Video Visit       History of Present Illness     Tone Marley is a 34 year old male who is seen in video visit for hypogonadism.    He was previously seen by Dr. Casey in October 2019 and has been recently following with Dr. Contreras.  He is new to me    He has a hx of antiphospholipid syndrome and is on anticoagulation for this. This was diagnosed at age 13 after he had a DVT in the lower extremity and PE.  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  he had headache, vision and speech abnormalities, sx improved with therapy.     Prior to therapy with testosterone he was having low libido, low energy, and difficulty losing weight.  I reviewed all his testosterone levels. Low Testosterone Total 200 (240-950) on 8/20/19 at 9:28am, 164 on 8/7/18 at 11:15 am, 124 on 3/8/18 at 7:47 am. He was seen by Dr. Zuleta 10/2019, pituitary access were checked:      Latest Reference Range & Units 10/30/19 07:40 10/30/19 07:41   Adrenal Corticotropin <47 pg/mL  48 (H)    Cortisol Serum 4 - 22 ug/dL  20.0   Free Testosterone Calculated 4.7 - 24.4 ng/dL 7.88    FSH 0.7 - 10.8 IU/L 2.8    Growth Hormone <1.3 ug/L <0.1    Prolactin 2 - 18 ug/L 25 (H)    PSA 0 - 4 ug/L 0.59    T4 Free 0.76 - 1.46 ng/dL 1.03    Testosterone Total 240 - 950 ng/dL 259    TSH 0.40 - 4.00 mU/L 4.44 (H)         Brain Mri 1/2020: showed a normal pituitary gland.    He initial started on T injections end of 2019 then switched to androgel.     He feels good since being on testosterone. He had LUTS when he was started on T injections in 2019. He also does not like needles and that's why he prefers topical.    No other side effects since starting T.    Had low-normal testosterone on 2 pumps/day androgel but appears to be supratherapeutic on 4 ppd.  he had discussion with Dr. Contreras about labs on topical are not always as consistent but this was fairly elevated so we will plan to lower dose and recheck. Recheck T level 11/2023  was 505.  Last hematocrit 9/2023 wnl.      Never fathered any children. He is interested in fertility in the near future.  He had concerns about his last FSH being suppressed on 5/2023.      # elevated PRL  Elevated PRL level without galactorrhea or gynecomastia.  No adenoma seen on MRI in 2020 when level was around 25.  PRL trended up to 55 on 9/2023 and 70 on 1/2024.    Problem List     Patient Active Problem List   Diagnosis    Antiphospholipid syndrome (H24)    Mixed hyperlipidemia    Benign essential hypertension    Attention deficit hyperactivity disorder (ADHD), predominantly inattentive type    History of pulmonary embolism    Chronic anticoagulation    Class 2 obesity due to excess calories without serious comorbidity with body mass index (BMI) of 37.0 to 37.9 in adult    Obesity (BMI 35.0-39.9) with comorbidity (H)    Antiphospholipid antibody syndrome (H24)    History of CVA (cerebrovascular accident)    Low serum testosterone    Rosacea    Hyperprolactinemia (H24)         Medications     Current Outpatient Medications   Medication Sig Dispense Refill    ASPIRIN PO Take 81 mg by mouth daily      atorvastatin (LIPITOR) 10 MG tablet TAKE 1 TABLET(10 MG) BY MOUTH DAILY 90 tablet 0    hydrochlorothiazide (HYDRODIURIL) 25 MG tablet TAKE 1 TABLET(25 MG) BY MOUTH DAILY 90 tablet 0    losartan (COZAAR) 100 MG tablet Take 1 tablet (100 mg) by mouth daily 90 tablet 3    Omega-3 Fatty Acids (FISH OIL PO)       testosterone (ANDROGEL 1.62 % PUMP) 20.25 MG/ACT gel Place 3 Pump onto the skin daily Apply from dispenser to clean, dry, intact skin of the upper arms and shoulders. 150 g 5    warfarin ANTICOAGULANT (COUMADIN) 10 MG tablet Take 15 mg on Wed, Sat and 12.5 mg all other days, or as directed by the Coumadin Clinic. Appt with primary provider needed for further refills. 90 tablet 0    warfarin ANTICOAGULANT (COUMADIN) 5 MG tablet Take 1/2 tablet by mouth daily along with 10 mg tablet for a total of 12.5 mg daily. Adjust dose based on INR results as directed. 90 tablet 3    levothyroxine (SYNTHROID/LEVOTHROID) 50 MCG tablet Take 1 tablet (50 mcg) by mouth daily (Patient not taking: Reported on 4/4/2024) 90 tablet 1    losartan (COZAAR) 25 MG tablet Take 50 mg by mouth daily (Patient not taking: Reported on 10/3/2023)       No current facility-administered medications for this visit.        Allergies     Allergies   Allergen Reactions    Nkda [No Known Drug Allergy]        Medical / Surgical History     Past Medical History:   Diagnosis Date    ADHD (attention deficit hyperactivity disorder) 6/27/2012    Antiphospholipid antibody syndromes     at age 13    Antiphospholipid syndrome (H24) 2/2/2012    On anticoagulation coumdin 10 mg once daily      Cerebral infarction (H) 7/1/18    Vertebral artery dissection    History of pulmonary embolism 9/2/2013    Was diagnosed  Positive CT scan in emergency department   now Under care of Dr Turcios at [facility],[phone] His nurse  personal line [phone] Was on lovenox for 5 months  Now  For past one month on xareltlo 20 mg once daily and plaqunil twice daily      Hyperlipidemia LDL goal <130 6/11/2013    Hypertension goal BP (blood pressure) < 140/90 6/19/12    Hypertension, uncontrolled 6/27/2012    Postphlebitic syndrome 12/9/2014    Prehypertension 2/2/2012    Pulmonary embolism (H)     at age 13    Pulmonary embolism (H) 1/11/2015     Past Surgical History:   Procedure Laterality Date    wisdom teeth extraction         Social History     Social History     Socioeconomic History    Marital status: Single     Spouse name: Not on file    Number of children: Not on file    Years of education: Not on file    Highest education level: Not on file   Occupational History     Employer: [employer]   Tobacco Use    Smoking status: Never    Smokeless tobacco: Former     Types: Chew     Quit date: 07/2018    Tobacco comments:     Chew maybe once per week.   Vaping Use    Vaping Use: Never used   Substance and Sexual Activity    Alcohol use: Yes     Comment: occasional    Drug use: No    Sexual activity: Yes     Partners: Female     Birth control/protection: I.U.D.   Other Topics Concern    Parent/sibling w/ CABG, MI or angioplasty before 65F 55M? 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 Paternal Uncle 42    Other Cancer Maternal Grandmother         Lung cancer (non-smoker)       ROS     12 ROS completed, pertinent positive and negative in HPI    Physical Exam   Wt 111.1 kg (245 lb)   BMI 33.23 kg/m     GENERAL: alert and no distress  EYES: Eyes grossly normal to inspection.  No discharge or erythema, or obvious scleral/conjunctival abnormalities.  RESP: No audible wheeze, cough, or visible cyanosis.    SKIN: Visible skin clear. No significant rash, abnormal pigmentation or lesions.  NEURO: Cranial nerves grossly intact.  Mentation and speech appropriate for age.  PSYCH: Appropriate affect, tone, and pace of words     Labs/Imaging     Pertinent Labs were reviewed and updated in EPIC and discussed briefly.  Radiology Results were  reviewed and updated in EPIC and discussed briefly.    Summary of recent findings:   Lab Results   Component Value Date    A1C 4.9 05/31/2022       TSH   Date Value Ref Range Status   01/10/2024 1.98 0.30 - 4.20 uIU/mL Final   11/08/2023 5.08 (H) 0.30 - 4.20 uIU/mL Final   12/27/2022 4.34 (H) 0.30 - 4.20 uIU/mL Final   10/30/2019 4.44 (H) 0.40 - 4.00 mU/L Final   02/21/2018 3.41 0.40 - 4.00 mU/L Final   08/18/2016 1.86 0.40 - 4.00 mU/L Final     T4 Free   Date Value Ref Range Status   10/30/2019 1.03 0.76 - 1.46 ng/dL Final     Free T4   Date Value Ref Range Status   11/08/2023 1.30 0.90 - 1.70 ng/dL Final   12/27/2022 1.20 0.90 - 1.70 ng/dL Final       Creatinine   Date Value Ref Range Status   04/26/2023 1.22 (H) 0.67 - 1.17 mg/dL Final   09/22/2020 1.11 0.66 - 1.25 mg/dL Final       Recent Labs   Lab Test 04/26/23  0858 01/05/22  0824   CHOL 136 155   HDL 44 46   LDL 73 81   TRIG 93 138       No results found for: \"[id number]\", \"[id number]\", \"[id number]\"    I personally reviewed the patient's outside records from [facility] EMR and Care " Everywhere. Summary of pertinent findings in HPI.    Impression / Plan       1) hypogonadotropic Hypogonadism  2) hyperprolactinemia  3) interested in fertility  Initial testing with low normal T level and inappropriately normal gonadotropins, thought to be related to his CVA.  He did have slightly elevated prolactin which has been trending up with known normal pituitary on imaging.  He was never treated for elevated prolactin.  He has been on testosterone replacement since 2019, he is interested in future fertility.  We spent a good time today talking about approach to infertility in men.  He never had any sperm analysis.  His FSH was undetectable it was checked while on testosterone replacement.  He had thought about stopping testosterone and see if his pituitary would recover, we discussed that this might take at least 6 months when he would be experiencing symptoms of hypogonadism, he did not want to proceed with it.  We discussed the use of hCG and FSH in the future to stimulate spermatogenesis.  We discussed that the process might take at least 6-month to 1 year with a regular lab monitoring.  His prolactin level has been trending up, although there is no lesion on the pituitary on most recent brain MRI this still could be a microadenoma not seen on imaging.  For now we will plan on repeating labs and monitoring.  We might have to consider treatment in the future.       We discussed the risks and benefits of testosterone therapy at length today including expected improvements from testosterone use (libido, erectile function, bone density) and that the side effects and specifically cardiovascular effects of testosterone are not fully known. The relationship between T administration and the risk of prostate cancer remains poorly understood. We will monitor for prostate cancer by checking PSA.     Testosterone therapy can also increase lower urinary tract symptoms. . Worsen untreated HOSSEIN. Common drug-related  adverse events include acne, oiliness of skin, and breast tenderness.     I reviewed the need to monitor hematocrit as elevated hematocrit is a known side effect and can cause increased blood viscosity and thereby increase the risk of stroke or thromboembolism.       We reviewed available formulation for T replacement ( pros and cons). He prefers to continue with AndroGel, refills provided.    4) subclinical hypothyroidism  Very mild subclinical hypothyroidism.  He reported he did not tolerate levothyroxine and prefers to stay off of it which is reasonable given his TSH mildly elevated and less than 10.  Plan to recheck TSH in 3-month with the rest of the labs as below.    Test and/or medications prescribed today:  Orders Placed This Encounter   Procedures    Prolactin    TSH with free T4 reflex    Hematocrit    [facility]; Macroprolactin (Laboratory Miscellaneous Order)    Iron and iron binding capacity    Ferritin    Testosterone Free and Total         Follow up: 6 month    The longitudinal plan of care for the diagnosis(es)/condition(s) as documented were addressed during this visit. Due to the added complexity in care, I will continue to support Tone in the subsequent management and with ongoing continuity of care.    Toshia Roche MD  Endocrinology, Diabetes and Metabolism  [facility]

## 2024-04-04 NOTE — NURSING NOTE
Is the patient currently in the state of [place]? YES    Visit mode:VIDEO    If the visit is dropped, the patient can be reconnected by: VIDEO VISIT: Text to cell phone:   Telephone Information:   Mobile [phone]       Will anyone else be joining the visit? NO  (If patient encounters technical issues they should call [phone]    How would you like to obtain your AVS? MyChart    Are changes needed to the allergy or medication list? Yes No longer takes Levothyroxin     Are refills needed on medications prescribed by this physician?     Reason for visit: RECHECK and Video Visit    Keyanna DOAN

## 2024-04-04 NOTE — LETTER
4/4/2024       RE: Tone Marley  [address]  [facility] [address]     Dear Colleague,    Thank you for referring your patient, Tone Marley, to the [facility] [place] at [facility]. Please see a copy of my visit note below.    Endocrinology Clinic Visit 4/4/2024      Video-Visit Details    Type of service:  Video Visit  Joined the call at 4/4/2024, 9:06:11 am.  Left the call at 4/4/2024, 9:3748 am.      Originating Location (pt. Location): Home        Distant Location (provider location):  Off-site    Mode of Communication:  Video Conference via [employer]    Physician has received verbal consent for a Video Visit from the patient? Yes    I spent a total of 63 minutes on the date of encounter reviewing medical records, evaluating the patient, coordinating care and documenting in the EHR, as detailed above.      NAME:  Tone Marley  PCP:  Yara Faust  MRN:  [id number]  Reason for Consult: Hypogonadism  Requesting Provider:  No ref. provider found    Chief Complaint     Chief Complaint   Patient presents with    RECHECK    Video Visit       History of Present Illness     Tone Marley is a 34 year old male who is seen in video visit for hypogonadism.    He was previously seen by Dr. Casey in October 2019 and has been recently following with Dr. Contreras.  He is new to me    He has a hx of antiphospholipid syndrome and is on anticoagulation for this. This was diagnosed at age 13 after he had a DVT in the lower extremity and PE.  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  he had headache, vision and speech abnormalities, sx improved with therapy.     Prior to therapy with testosterone he was having low libido, low energy, and difficulty losing weight.  I  reviewed all his testosterone levels. Low Testosterone Total 200 (240-950) on 8/20/19 at 9:28am, 164 on 8/7/18 at 11:15 am, 124 on 3/8/18 at 7:47 am. He was seen by Dr. Zuleta 10/2019, pituitary access were checked:      Latest Reference Range & Units 10/30/19 07:40 10/30/19 07:41   Adrenal Corticotropin <47 pg/mL  48 (H)   Cortisol Serum 4 - 22 ug/dL  20.0   Free Testosterone Calculated 4.7 - 24.4 ng/dL 7.88    FSH 0.7 - 10.8 IU/L 2.8    Growth Hormone <1.3 ug/L <0.1    Prolactin 2 - 18 ug/L 25 (H)    PSA 0 - 4 ug/L 0.59    T4 Free 0.76 - 1.46 ng/dL 1.03    Testosterone Total 240 - 950 ng/dL 259    TSH 0.40 - 4.00 mU/L 4.44 (H)         Brain Mri 1/2020: showed a normal pituitary gland.    He initial started on T injections end of 2019 then switched to androgel.     He feels good since being on testosterone. He had LUTS when he was started on T injections in 2019. He also does not like needles and that's why he prefers topical.    No other side effects since starting T.    Had low-normal testosterone on 2 pumps/day androgel but appears to be supratherapeutic on 4 ppd.  he had discussion with Dr. Contreras about labs on topical are not always as consistent but this was fairly elevated so we will plan to lower dose and recheck. Recheck T level 11/2023  was 505.  Last hematocrit 9/2023 wnl.      Never fathered any children. He is interested in fertility in the near future.  He had concerns about his last FSH being suppressed on 5/2023.      # elevated PRL  Elevated PRL level without galactorrhea or gynecomastia.  No adenoma seen on MRI in 2020 when level was around 25.  PRL trended up to 55 on 9/2023 and 70 on 1/2024.    Problem List     Patient Active Problem List   Diagnosis    Antiphospholipid syndrome (H24)    Mixed hyperlipidemia    Benign essential hypertension    Attention deficit hyperactivity disorder (ADHD), predominantly inattentive type    History of pulmonary embolism    Chronic anticoagulation    Class 2  obesity due to excess calories without serious comorbidity with body mass index (BMI) of 37.0 to 37.9 in adult    Obesity (BMI 35.0-39.9) with comorbidity (H)    Antiphospholipid antibody syndrome (H24)    History of CVA (cerebrovascular accident)    Low serum testosterone    Rosacea    Hyperprolactinemia (H24)        Medications     Current Outpatient Medications   Medication Sig Dispense Refill    ASPIRIN PO Take 81 mg by mouth daily      atorvastatin (LIPITOR) 10 MG tablet TAKE 1 TABLET(10 MG) BY MOUTH DAILY 90 tablet 0    hydrochlorothiazide (HYDRODIURIL) 25 MG tablet TAKE 1 TABLET(25 MG) BY MOUTH DAILY 90 tablet 0    losartan (COZAAR) 100 MG tablet Take 1 tablet (100 mg) by mouth daily 90 tablet 3    Omega-3 Fatty Acids (FISH OIL PO)       testosterone (ANDROGEL 1.62 % PUMP) 20.25 MG/ACT gel Place 3 Pump onto the skin daily Apply from dispenser to clean, dry, intact skin of the upper arms and shoulders. 150 g 5    warfarin ANTICOAGULANT (COUMADIN) 10 MG tablet Take 15 mg on Wed, Sat and 12.5 mg all other days, or as directed by the Coumadin Clinic. Appt with primary provider needed for further refills. 90 tablet 0    warfarin ANTICOAGULANT (COUMADIN) 5 MG tablet Take 1/2 tablet by mouth daily along with 10 mg tablet for a total of 12.5 mg daily. Adjust dose based on INR results as directed. 90 tablet 3    levothyroxine (SYNTHROID/LEVOTHROID) 50 MCG tablet Take 1 tablet (50 mcg) by mouth daily (Patient not taking: Reported on 4/4/2024) 90 tablet 1    losartan (COZAAR) 25 MG tablet Take 50 mg by mouth daily (Patient not taking: Reported on 10/3/2023)       No current facility-administered medications for this visit.        Allergies     Allergies   Allergen Reactions    Nkda [No Known Drug Allergy]        Medical / Surgical History     Past Medical History:   Diagnosis Date    ADHD (attention deficit hyperactivity disorder) 6/27/2012    Antiphospholipid antibody syndromes     at age 13    Antiphospholipid  syndrome (H24) 2/2/2012    On anticoagulation coumdin 10 mg once daily      Cerebral infarction (H) 7/1/18    Vertebral artery dissection    History of pulmonary embolism 9/2/2013    Was diagnosed  Positive CT scan in emergency department   now Under care of Dr Turcios at [facility] at [facility] to [facility],[phone] His nurse personal line [phone] Was on lovenox for 5 months  Now  For past one month on xareltlo 20 mg once daily and plaqunil twice daily      Hyperlipidemia LDL goal <130 6/11/2013    Hypertension goal BP (blood pressure) < 140/90 6/19/12    Hypertension, uncontrolled 6/27/2012    Postphlebitic syndrome 12/9/2014    Prehypertension 2/2/2012    Pulmonary embolism (H)     at age 13    Pulmonary embolism (H) 1/11/2015     Past Surgical History:   Procedure Laterality Date    wisdom teeth extraction         Social History     Social History     Socioeconomic History    Marital status: Single     Spouse name: Not on file    Number of children: Not on file    Years of education: Not on file    Highest education level: Not on file   Occupational History     Employer: [employer]   Tobacco Use    Smoking status: Never    Smokeless tobacco: Former     Types: Chew     Quit date: 07/2018    Tobacco comments:     Chew maybe once per week.   Vaping Use    Vaping Use: Never used   Substance and Sexual Activity    Alcohol use: Yes     Comment: occasional    Drug use: No    Sexual activity: Yes     Partners: Female     Birth control/protection: I.U.D.   Other Topics Concern    Parent/sibling w/ CABG, MI or angioplasty before 65F 55M? 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 Paternal Uncle 42    Other Cancer Maternal Grandmother         Lung cancer (non-smoker)       ROS     12 ROS completed, pertinent positive and negative in HPI    Physical Exam   Wt 111.1 kg (245 lb)   BMI 33.23 kg/m     GENERAL: alert and no distress  EYES: Eyes grossly normal to inspection.  No discharge or erythema, or obvious scleral/conjunctival abnormalities.  RESP: No audible wheeze, cough, or visible cyanosis.    SKIN: Visible skin clear. No significant rash, abnormal pigmentation or lesions.  NEURO: Cranial nerves grossly intact.  Mentation and speech appropriate for age.  PSYCH: Appropriate affect, tone, and pace of words     Labs/Imaging     Pertinent Labs were reviewed and updated in EPIC and discussed briefly.  Radiology Results were  reviewed and updated in EPIC and discussed briefly.    Summary of recent findings:   Lab Results   Component Value Date    A1C 4.9 05/31/2022       TSH   Date Value Ref Range Status   01/10/2024 1.98 0.30 - 4.20 uIU/mL Final   11/08/2023 5.08 (H) 0.30 - 4.20 uIU/mL Final   12/27/2022 4.34 (H) 0.30 - 4.20 uIU/mL Final   10/30/2019 4.44 (H) 0.40 - 4.00 mU/L Final   02/21/2018 3.41 0.40 - 4.00 mU/L Final   08/18/2016 1.86 0.40 - 4.00 mU/L Final     T4 Free   Date Value Ref Range Status   10/30/2019 1.03 0.76 - 1.46 ng/dL Final     Free T4   Date Value Ref Range Status   11/08/2023 1.30 0.90 - 1.70 ng/dL Final   12/27/2022 1.20 0.90 - 1.70 ng/dL Final       Creatinine   Date Value Ref Range Status   04/26/2023 1.22 (H) 0.67 -  "1.17 mg/dL Final   09/22/2020 1.11 0.66 - 1.25 mg/dL Final       Recent Labs   Lab Test 04/26/23  0858 01/05/22  0824   CHOL 136 155   HDL 44 46   LDL 73 81   TRIG 93 138       No results found for: \"[id number]\", \"[id number]\", \"[id number]\"    I personally reviewed the patient's outside records from [facility] EMR and Care Everywhere. Summary of pertinent findings in HPI.    Impression / Plan       1) hypogonadotropic Hypogonadism  2) hyperprolactinemia  3) interested in fertility  Initial testing with low normal T level and inappropriately normal gonadotropins, thought to be related to his CVA.  He did have slightly elevated prolactin which has been trending up with known normal pituitary on imaging.  He was never treated for elevated prolactin.  He has been on testosterone replacement since 2019, he is interested in future fertility.  We spent a good time today talking about approach to infertility in men.  He never had any sperm analysis.  His FSH was undetectable it was checked while on testosterone replacement.  He had thought about stopping testosterone and see if his pituitary would recover, we discussed that this might take at least 6 months when he would be experiencing symptoms of hypogonadism, he did not want to proceed with it.  We discussed the use of hCG and FSH in the future to stimulate spermatogenesis.  We discussed that the process might take at least 6-month to 1 year with a regular lab monitoring.  His prolactin level has been trending up, although there is no lesion on the pituitary on most recent brain MRI this still could be a microadenoma not seen on imaging.  For now we will plan on repeating labs and monitoring.  We might have to consider treatment in the future.       We discussed the risks and benefits of testosterone therapy at length today including expected improvements from testosterone use (libido, erectile function, bone density) and that the side effects and specifically cardiovascular " effects of testosterone are not fully known. The relationship between T administration and the risk of prostate cancer remains poorly understood. We will monitor for prostate cancer by checking PSA.     Testosterone therapy can also increase lower urinary tract symptoms. . Worsen untreated HOSSEIN. Common drug-related adverse events include acne, oiliness of skin, and breast tenderness.     I reviewed the need to monitor hematocrit as elevated hematocrit is a known side effect and can cause increased blood viscosity and thereby increase the risk of stroke or thromboembolism.       We reviewed available formulation for T replacement ( pros and cons). He prefers to continue with AndroGel, refills provided.    4) subclinical hypothyroidism  Very mild subclinical hypothyroidism.  He reported he did not tolerate levothyroxine and prefers to stay off of it which is reasonable given his TSH mildly elevated and less than 10.  Plan to recheck TSH in 3-month with the rest of the labs as below.    Test and/or medications prescribed today:  Orders Placed This Encounter   Procedures    Prolactin    TSH with free T4 reflex    Hematocrit    [facility]; Macroprolactin (Laboratory Miscellaneous Order)    Iron and iron binding capacity    Ferritin    Testosterone Free and Total         Follow up: 6 month    The longitudinal plan of care for the diagnosis(es)/condition(s) as documented were addressed during this visit. Due to the added complexity in care, I will continue to support Tone in the subsequent management and with ongoing continuity of care.    Toshia Roche MD  Endocrinology, Diabetes and Metabolism  [facility]

## 2024-04-05 ENCOUNTER — TELEPHONE (OUTPATIENT)
Dept: ENDOCRINOLOGY | Facility: CLINIC | Age: 34
End: 2024-04-05
Payer: COMMERCIAL

## 2024-04-05 LAB — TESTOST SERPL-MCNC: 603 NG/DL (ref 240–950)

## 2024-04-11 NOTE — TELEPHONE ENCOUNTER
Retail Pharmacy Prior Authorization Team   Phone: [phone]    PA Initiation    Medication: TESTOSTERONE 20.25 MG/ACT (1.62%) TD GEL  Insurance Company: [employer] [place] - Phone [phone] Fax [phone]  Pharmacy Filling the Rx: [facility] #[id number] - [place], [place] - [facility] AT [facility] [address]  Filling Pharmacy Phone: [phone]  Filling Pharmacy Fax: [phone]  Start Date: 4/11/2024

## 2024-04-12 NOTE — TELEPHONE ENCOUNTER
Prior Authorization Approval    Medication: TESTOSTERONE 20.25 MG/ACT (1.62%) TD GEL  Authorization Effective Date: 3/12/2024  Authorization Expiration Date: 4/11/2025  Approved Dose/Quantity:   Reference #:     Insurance Company: [employer] [place] - Phone [phone] Fax [phone]  Expected CoPay: $    CoPay Card Available:      Financial Assistance Needed:   Which Pharmacy is filling the prescription: [facility] #[id number] - [place], [place] - [address] AT [facility]  Pharmacy Notified: Yes  Patient Notified:

## 2024-04-15 ENCOUNTER — MYC REFILL (OUTPATIENT)
Dept: ENDOCRINOLOGY | Facility: CLINIC | Age: 34
End: 2024-04-15
Payer: COMMERCIAL

## 2024-04-15 DIAGNOSIS — R79.89 LOW SERUM TESTOSTERONE: ICD-10-CM

## 2024-04-15 NOTE — TELEPHONE ENCOUNTER
Medication not on nurse protocol. Sent to provider for review.  Danielle Cortez RN BSN PHN    LOV 4/4/24

## 2024-04-16 RX ORDER — TESTOSTERONE 1.62 MG/G
3 GEL TRANSDERMAL DAILY
Qty: 150 G | Refills: 5 | Status: SHIPPED | OUTPATIENT
Start: 2024-04-16 | End: 2024-09-25

## 2024-04-23 ENCOUNTER — VIRTUAL VISIT (OUTPATIENT)
Dept: FAMILY MEDICINE | Facility: CLINIC | Age: 34
End: 2024-04-23
Payer: COMMERCIAL

## 2024-04-23 DIAGNOSIS — Z86.73 HISTORY OF CVA (CEREBROVASCULAR ACCIDENT): ICD-10-CM

## 2024-04-23 DIAGNOSIS — Z79.01 CHRONIC ANTICOAGULATION: ICD-10-CM

## 2024-04-23 DIAGNOSIS — D68.61 ANTIPHOSPHOLIPID ANTIBODY SYNDROME (H): ICD-10-CM

## 2024-04-23 DIAGNOSIS — Z86.711 HISTORY OF PULMONARY EMBOLISM: ICD-10-CM

## 2024-04-23 DIAGNOSIS — I74.9 EMBOLISM AND THROMBOSIS OF ARTERY (H): ICD-10-CM

## 2024-04-23 DIAGNOSIS — I10 HYPERTENSION GOAL BP (BLOOD PRESSURE) < 130/80: ICD-10-CM

## 2024-04-23 DIAGNOSIS — E66.01 MORBID OBESITY (H): ICD-10-CM

## 2024-04-23 DIAGNOSIS — E78.5 HYPERLIPIDEMIA LDL GOAL <100: Primary | ICD-10-CM

## 2024-04-23 PROCEDURE — 99213 OFFICE O/P EST LOW 20 MIN: CPT | Mod: 95 | Performed by: FAMILY MEDICINE

## 2024-04-23 RX ORDER — LOSARTAN POTASSIUM 100 MG/1
100 TABLET ORAL DAILY
Qty: 90 TABLET | Refills: 3 | Status: SHIPPED | OUTPATIENT
Start: 2024-04-23

## 2024-04-23 RX ORDER — ATORVASTATIN CALCIUM 10 MG/1
10 TABLET, FILM COATED ORAL DAILY
Qty: 90 TABLET | Refills: 0 | Status: SHIPPED | OUTPATIENT
Start: 2024-04-23 | End: 2024-09-26

## 2024-04-23 RX ORDER — HYDROCHLOROTHIAZIDE 25 MG/1
25 TABLET ORAL DAILY
Qty: 90 TABLET | Refills: 3 | Status: SHIPPED | OUTPATIENT
Start: 2024-04-23

## 2024-04-23 RX ORDER — WARFARIN SODIUM 10 MG/1
TABLET ORAL
Qty: 90 TABLET | Refills: 0 | Status: SHIPPED | OUTPATIENT
Start: 2024-04-23 | End: 2024-09-24

## 2024-04-23 RX ORDER — WARFARIN SODIUM 5 MG/1
TABLET ORAL
Qty: 90 TABLET | Refills: 3 | Status: SHIPPED | OUTPATIENT
Start: 2024-04-23

## 2024-04-23 NOTE — PROGRESS NOTES
Tone is a 34 year old who is being evaluated via a billable video visit.    How would you like to obtain your AVS? MyChart  If the video visit is dropped, the invitation should be resent by: Text to cell phone: [phone]  Will anyone else be joining your video visit? No      Assessment & Plan     1. Hypertension goal BP (blood pressure) < 130/80  Plan: Blood pressure well controlled   Refill given for 1 yr  Advised to add labs with next routine appointment   - Basic metabolic panel  (Ca, Cl, CO2, Creat, Gluc, K, Na, BUN); Future  - hydrochlorothiazide (HYDRODIURIL) 25 MG tablet; Take 1 tablet (25 mg) by mouth daily  Dispense: 90 tablet; Refill: 3  - losartan (COZAAR) 100 MG tablet; Take 1 tablet (100 mg) by mouth daily  Dispense: 90 tablet; Refill: 3    2. Hyperlipidemia LDL goal <100  Plan: fasting lipid recheck is advised  - Lipid Profile (Chol, Trig, HDL, LDL calc); Future  - atorvastatin (LIPITOR) 10 MG tablet; Take 1 tablet (10 mg) by mouth daily  Dispense: 90 tablet; Refill: 0    3. Chronic anticoagulation  4. Antiphospholipid antibody syndrome (H24)  5.History of pulmonary embolism  6.History of CVA (cerebrovascular accident)  Plan: continue close monitoring with anticoagulation nurse/lab follow up   Refill given for 1 yr  The dose maybe changed based on blood test  Hematology consultation 4/2019- and he was switched back from  xraelto to coumadin because of concern about being high risk for stroke while on xarelto  - warfarin ANTICOAGULANT (COUMADIN) 10 MG tablet; Take 15 mg on Wed, Sat and 12.5 mg all other days, or as directed by the Coumadin Clinic. Appt with primary provider needed for further refills.  Dispense: 90 tablet; Refill: 0  - warfarin ANTICOAGULANT (COUMADIN) 5 MG tablet; Take 1/2 tablet by mouth daily along with 10 mg tablet for a total of 12.5 mg daily. Adjust dose based on INR results as directed.  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 Morbid obesity (H)        Ordering of each unique test  Prescription drug management  I spent a total of 30 minutes on the day of the visit.   Time spent by me doing chart review, history and exam, documentation and further activities per the note            Subjective   Tone is a 34 year old, presenting for the following health issues:  Recheck Medication and Hypertension    HPI           Review of Systems  Constitutional, neuro, ENT, endocrine, pulmonary, cardiac, gastrointestinal, genitourinary, musculoskeletal, integument and psychiatric systems are negative, except as otherwise noted.      Objective           Vitals:  No vitals were obtained today due to virtual visit.  Wt as per patient 240lbs  Blood pressure 120/80  Physical Exam   GENERAL: alert and no distress  EYES: Eyes grossly normal to inspection.  No discharge or erythema, or obvious scleral/conjunctival abnormalities.  RESP: No audible wheeze, cough, or visible cyanosis.    SKIN: Visible skin clear. No significant rash, abnormal pigmentation or lesions.  NEURO: Cranial nerves grossly intact.  Mentation and speech appropriate for age.  PSYCH: Appropriate affect, tone, and pace of words          Video-Visit Details    Type of service:  Video Visit   Originating Location (pt. Location): Home    Distant Location (provider location):  On-site  Platform used for Video Visit: Lorena  Signed Electronically by: Yara Faust MD  2

## 2024-05-15 ENCOUNTER — ANTICOAGULATION THERAPY VISIT (OUTPATIENT)
Dept: ANTICOAGULATION | Facility: CLINIC | Age: 34
End: 2024-05-15

## 2024-05-15 ENCOUNTER — LAB (OUTPATIENT)
Dept: LAB | Facility: CLINIC | Age: 34
End: 2024-05-15
Payer: COMMERCIAL

## 2024-05-15 DIAGNOSIS — D68.61 ANTIPHOSPHOLIPID ANTIBODY SYNDROME (H): Primary | ICD-10-CM

## 2024-05-15 DIAGNOSIS — Z79.01 CHRONIC ANTICOAGULATION: ICD-10-CM

## 2024-05-15 DIAGNOSIS — I10 HYPERTENSION GOAL BP (BLOOD PRESSURE) < 130/80: ICD-10-CM

## 2024-05-15 DIAGNOSIS — Z86.711 HISTORY OF PULMONARY EMBOLISM: ICD-10-CM

## 2024-05-15 DIAGNOSIS — Z86.73 HISTORY OF CVA (CEREBROVASCULAR ACCIDENT): ICD-10-CM

## 2024-05-15 DIAGNOSIS — D68.61 ANTIPHOSPHOLIPID ANTIBODY SYNDROME (H): ICD-10-CM

## 2024-05-15 DIAGNOSIS — D68.61 ANTIPHOSPHOLIPID SYNDROME (H): ICD-10-CM

## 2024-05-15 DIAGNOSIS — E78.5 HYPERLIPIDEMIA LDL GOAL <100: ICD-10-CM

## 2024-05-15 LAB
ANION GAP SERPL CALCULATED.3IONS-SCNC: 10 MMOL/L (ref 7–15)
BUN SERPL-MCNC: 23.7 MG/DL (ref 6–20)
CALCIUM SERPL-MCNC: 9.8 MG/DL (ref 8.6–10)
CHLORIDE SERPL-SCNC: 101 MMOL/L (ref 98–107)
CHOLEST SERPL-MCNC: 149 MG/DL
CREAT SERPL-MCNC: 1.02 MG/DL (ref 0.67–1.17)
DEPRECATED HCO3 PLAS-SCNC: 24 MMOL/L (ref 22–29)
EGFRCR SERPLBLD CKD-EPI 2021: >90 ML/MIN/1.73M2
FACT X ACT/NOR PPP CHRO: 24 % (ref 70–130)
FASTING STATUS PATIENT QL REPORTED: YES
FASTING STATUS PATIENT QL REPORTED: YES
GLUCOSE SERPL-MCNC: 94 MG/DL (ref 70–99)
HDLC SERPL-MCNC: 45 MG/DL
LDLC SERPL CALC-MCNC: 84 MG/DL
NONHDLC SERPL-MCNC: 104 MG/DL
POTASSIUM SERPL-SCNC: 4.3 MMOL/L (ref 3.4–5.3)
SODIUM SERPL-SCNC: 135 MMOL/L (ref 135–145)
TRIGL SERPL-MCNC: 102 MG/DL

## 2024-05-15 PROCEDURE — 80061 LIPID PANEL: CPT

## 2024-05-15 PROCEDURE — 85260 CLOT FACTOR X STUART-POWER: CPT

## 2024-05-15 PROCEDURE — 80048 BASIC METABOLIC PNL TOTAL CA: CPT

## 2024-05-15 PROCEDURE — 36415 COLL VENOUS BLD VENIPUNCTURE: CPT

## 2024-05-15 NOTE — PROGRESS NOTES
ANTICOAGULATION MANAGEMENT     Tone Marley 34 year old male is on warfarin with therapeutic result. (Goal Chromogenic Factor X 40-20%)    Recent labs: (last 7 days)     05/15/24  0805   [id number] 24*       ASSESSMENT     Warfarin Lab Questionnaire    Warfarin Doses Last 7 Days      5/14/2024     7:27 PM   Dose in Tablet or Mg   TAB or MG? milligram (mg)     Pt Rptd Dose SUNDAY MONDAY TUESDAY WED THURS FRIDAY SATURDAY 5/14/2024   7:27 PM 12.5 12.5 12.5 15 12.5 12.5 15         5/14/2024   Warfarin Lab Questionnaire   Missed doses within past 14 days? No   Changes in diet or alcohol within past 14 days? No   Medication changes since last result? No-See below   Injuries or illness since last result? No   New shortness of breath, severe headaches or sudden changes in vision since last result? No   Abnormal bleeding since last result? No   Upcoming surgery, procedure? No     Previous result: Therapeutic last 2(+) visits     Additional findings: Levothyroxine stopped and Losartan increased 4/23/24. Going off Levothyroxine can decrease response to warfarin therapy per [employer]. Tone says he only took the Levothyroxine for a few weeks and had trouble on it, so he went off. No ill effects since stopping.     PLAN     Recommended plan for no diet, medication or health factor changes affecting result    Dosing Instructions: Continue your current warfarin dose 15 mg every Wed, Sat; 12.5 mg all other days  with next Chromogenic Factor X in 6 weeks       Telephone call with Tone who verbalizes understanding and agrees to plan    Lab visit scheduled    Education provided:   Goal range and lab monitoring: goal range and significance of current result  Contact [phone] with any changes, questions or concerns.     Plan made per [facility] anticoagulation protocol    Abby Chatman RN  Anticoagulation Clinic  5/15/2024

## 2024-06-26 ENCOUNTER — LAB (OUTPATIENT)
Dept: LAB | Facility: CLINIC | Age: 34
End: 2024-06-26
Payer: COMMERCIAL

## 2024-06-26 DIAGNOSIS — Z86.711 HISTORY OF PULMONARY EMBOLISM: ICD-10-CM

## 2024-06-26 DIAGNOSIS — D68.61 ANTIPHOSPHOLIPID ANTIBODY SYNDROME (H): ICD-10-CM

## 2024-06-26 DIAGNOSIS — Z79.01 CHRONIC ANTICOAGULATION: ICD-10-CM

## 2024-06-26 DIAGNOSIS — Z86.73 HISTORY OF CVA (CEREBROVASCULAR ACCIDENT): ICD-10-CM

## 2024-06-26 PROCEDURE — 85260 CLOT FACTOR X STUART-POWER: CPT

## 2024-06-26 PROCEDURE — 36415 COLL VENOUS BLD VENIPUNCTURE: CPT

## 2024-06-27 ENCOUNTER — ANTICOAGULATION THERAPY VISIT (OUTPATIENT)
Dept: ANTICOAGULATION | Facility: CLINIC | Age: 34
End: 2024-06-27
Payer: COMMERCIAL

## 2024-06-27 DIAGNOSIS — Z86.73 HISTORY OF CVA (CEREBROVASCULAR ACCIDENT): ICD-10-CM

## 2024-06-27 DIAGNOSIS — D68.61 ANTIPHOSPHOLIPID SYNDROME (H): ICD-10-CM

## 2024-06-27 DIAGNOSIS — D68.61 ANTIPHOSPHOLIPID ANTIBODY SYNDROME (H): Primary | ICD-10-CM

## 2024-06-27 DIAGNOSIS — Z79.01 CHRONIC ANTICOAGULATION: ICD-10-CM

## 2024-06-27 DIAGNOSIS — Z86.711 HISTORY OF PULMONARY EMBOLISM: ICD-10-CM

## 2024-06-27 LAB — FACT X ACT/NOR PPP CHRO: 40 % (ref 70–130)

## 2024-06-27 NOTE — PROGRESS NOTES
ANTICOAGULATION MANAGEMENT     Shahram Sitzman 34 year old male is on warfarin with therapeutic result. (Goal Chromogenic Factor X 40-20%)    Recent labs: (last 7 days)     06/26/24  0755   [id number] 40*       ASSESSMENT     Warfarin Lab Questionnaire    Warfarin Doses Last 7 Days      6/25/2024    11:14 PM   Dose in Tablet or Mg   TAB or MG? milligram (mg)     Pt Rptd Dose SUNDAY MONDAY TUESDAY WED THURS FRIDAY SATURDAY 6/25/2024  11:14 PM 12.5 12.5 12.5 15 12.5 12.5 15         6/25/2024   Warfarin Lab Questionnaire   Missed doses within past 14 days? No   Changes in diet or alcohol within past 14 days? No   Medication changes since last result? No   Injuries or illness since last result? No   New shortness of breath, severe headaches or sudden changes in vision since last result? No   Abnormal bleeding since last result? No   Upcoming surgery, procedure? No        Previous result: Therapeutic last 2(+) visits  Additional findings: Patient states he did have food poisoning on 6/23 and had vomiting.  Took warfarin that day still, but unsure if he vomited pills up.  States he is feeling better and only vomited that day.      PLAN     Recommended plan for temporary change(s) affecting result    Dosing Instructions: Continue your current warfarin dose 15mg Wed, Sat and 12.5mg all other days  with next Chromogenic Factor X in 6 weeks       Telephone call with Tone who verbalizes understanding and agrees to plan    Lab visit scheduled    Education provided:   Importance of notifying anticoagulation clinic for: diarrhea, nausea/vomiting, reduced intake, cold/flu, and/or infections; a sooner lab recheck maybe needed    Plan made per [facility] anticoagulation protocol    Guerline Pena RN  Anticoagulation Clinic  6/27/2024

## 2024-06-30 ENCOUNTER — HEALTH MAINTENANCE LETTER (OUTPATIENT)
Age: 34
End: 2024-06-30

## 2024-08-14 ENCOUNTER — LAB (OUTPATIENT)
Dept: LAB | Facility: CLINIC | Age: 34
End: 2024-08-14
Payer: COMMERCIAL

## 2024-08-14 DIAGNOSIS — R79.89 LOW SERUM TESTOSTERONE: ICD-10-CM

## 2024-08-14 DIAGNOSIS — Z86.73 HISTORY OF CVA (CEREBROVASCULAR ACCIDENT): ICD-10-CM

## 2024-08-14 DIAGNOSIS — Z86.711 HISTORY OF PULMONARY EMBOLISM: ICD-10-CM

## 2024-08-14 DIAGNOSIS — E03.9 HYPOTHYROIDISM, UNSPECIFIED TYPE: ICD-10-CM

## 2024-08-14 DIAGNOSIS — D68.61 ANTIPHOSPHOLIPID ANTIBODY SYNDROME (H): ICD-10-CM

## 2024-08-14 DIAGNOSIS — Z79.01 CHRONIC ANTICOAGULATION: ICD-10-CM

## 2024-08-14 DIAGNOSIS — E22.1 HYPERPROLACTINEMIA (H): ICD-10-CM

## 2024-08-14 LAB
FERRITIN SERPL-MCNC: 284 NG/ML (ref 31–409)
HCT VFR BLD AUTO: 46.4 % (ref 40–53)
IRON BINDING CAPACITY (ROCHE): 324 UG/DL (ref 240–430)
IRON SATN MFR SERPL: 35 % (ref 15–46)
IRON SERPL-MCNC: 114 UG/DL (ref 61–157)
Lab: NORMAL
PERFORMING LABORATORY: NORMAL
PROLACTIN SERPL 3RD IS-MCNC: 73 NG/ML (ref 4–15)
SHBG SERPL-SCNC: 18 NMOL/L (ref 11–80)
SPECIMEN STATUS: NORMAL
T4 FREE SERPL-MCNC: 1.27 NG/DL (ref 0.9–1.7)
TEST NAME: NORMAL
TSH SERPL DL<=0.005 MIU/L-ACNC: 4.56 UIU/ML (ref 0.3–4.2)

## 2024-08-14 PROCEDURE — 82728 ASSAY OF FERRITIN: CPT

## 2024-08-14 PROCEDURE — 83540 ASSAY OF IRON: CPT

## 2024-08-14 PROCEDURE — 85260 CLOT FACTOR X STUART-POWER: CPT

## 2024-08-14 PROCEDURE — 84403 ASSAY OF TOTAL TESTOSTERONE: CPT

## 2024-08-14 PROCEDURE — 84439 ASSAY OF FREE THYROXINE: CPT

## 2024-08-14 PROCEDURE — 84270 ASSAY OF SEX HORMONE GLOBUL: CPT

## 2024-08-14 PROCEDURE — 84146 ASSAY OF PROLACTIN: CPT

## 2024-08-14 PROCEDURE — 85014 HEMATOCRIT: CPT

## 2024-08-14 PROCEDURE — 84443 ASSAY THYROID STIM HORMONE: CPT

## 2024-08-14 PROCEDURE — 36415 COLL VENOUS BLD VENIPUNCTURE: CPT

## 2024-08-14 PROCEDURE — 83550 IRON BINDING TEST: CPT

## 2024-08-15 ENCOUNTER — DOCUMENTATION ONLY (OUTPATIENT)
Dept: ANTICOAGULATION | Facility: CLINIC | Age: 34
End: 2024-08-15
Payer: COMMERCIAL

## 2024-08-15 ENCOUNTER — ANTICOAGULATION THERAPY VISIT (OUTPATIENT)
Dept: ANTICOAGULATION | Facility: CLINIC | Age: 34
End: 2024-08-15
Payer: COMMERCIAL

## 2024-08-15 DIAGNOSIS — Z86.711 HISTORY OF PULMONARY EMBOLISM: ICD-10-CM

## 2024-08-15 DIAGNOSIS — D68.61 ANTIPHOSPHOLIPID SYNDROME (H): ICD-10-CM

## 2024-08-15 DIAGNOSIS — I74.9 EMBOLISM AND THROMBOSIS OF ARTERY (H): ICD-10-CM

## 2024-08-15 DIAGNOSIS — Z86.73 HISTORY OF CVA (CEREBROVASCULAR ACCIDENT): Primary | ICD-10-CM

## 2024-08-15 DIAGNOSIS — Z79.01 CHRONIC ANTICOAGULATION: ICD-10-CM

## 2024-08-15 DIAGNOSIS — Z86.73 HISTORY OF CVA (CEREBROVASCULAR ACCIDENT): ICD-10-CM

## 2024-08-15 DIAGNOSIS — D68.61 ANTIPHOSPHOLIPID ANTIBODY SYNDROME (H): Primary | ICD-10-CM

## 2024-08-15 LAB — FACT X ACT/NOR PPP CHRO: 28 % (ref 70–130)

## 2024-08-15 NOTE — PROGRESS NOTES
ANTICOAGULATION MANAGEMENT     Tone Marley 34 year old male is on warfarin with therapeutic result. (Goal Chromogenic Factor X 40-20%)    Recent labs: (last 7 days)     08/14/24  0914   [id number] 28*       ASSESSMENT     Warfarin Lab Questionnaire    Warfarin Doses Last 7 Days      8/14/2024     9:06 AM   Dose in Tablet or Mg   TAB or MG? milligram (mg)     Pt Rptd Dose SUNDAY MONDAY TUESDAY WED THURS FRIDAY SATURDAY 8/14/2024   9:06 AM 12.5 12.5 12.5 15 12.5 12.5 15         8/14/2024   Warfarin Lab Questionnaire   Missed doses within past 14 days? No   Changes in diet or alcohol within past 14 days? No   Medication changes since last result? No   Injuries or illness since last result? No   New shortness of breath, severe headaches or sudden changes in vision since last result? No   Abnormal bleeding since last result? No   Upcoming surgery, procedure? No   Best number to call with results? [phone]        Previous result: Therapeutic last 2(+) visits  Additional findings: None     PLAN     Recommended plan for no diet, medication or health factor changes affecting result    Dosing Instructions: Continue your current warfarin dose 15mg Wed/Sat and 12.5mg rest of week   with next Chromogenic Factor X in 6 weeks       Telephone call with Tone who verbalizes understanding and agrees to plan    Lab visit scheduled    Education provided:   Contact [phone] with any changes, questions or concerns.     Plan made per [facility] anticoagulation protocol    Ju Mora RN  Anticoagulation Clinic  8/15/2024

## 2024-08-15 NOTE — PROGRESS NOTES
ANTICOAGULATION CLINIC REFERRAL RENEWAL REQUEST       An annual renewal order is required for all patients referred to [facility].?  Please review and sign the pended referral order for Tone Marley.       ANTICOAGULATION SUMMARY      Warfarin indication(s)   Stroke, PE, and Antiphospholipid Antibodies    Mechanical heart valve present?  NO       Current goal range   Chromogenic Factor X:  40-20%     Goal appropriate for indication? Chromogenic Factor X of 40-20% appropriate for indication(s) above     Time in Therapeutic Range (TTR)  (Goal > 60%) N/A for CFX       Office visit with referring provider's group within last year yes on 4/23/24       Ju Mora RN  [facility]

## 2024-08-17 LAB
TESTOST FREE SERPL-MCNC: 9.97 NG/DL
TESTOST SERPL-MCNC: 367 NG/DL (ref 240–950)

## 2024-08-18 DIAGNOSIS — E22.1 HYPERPROLACTINEMIA (H): Primary | ICD-10-CM

## 2024-08-18 RX ORDER — CABERGOLINE 0.5 MG/1
0.25 TABLET ORAL WEEKLY
Qty: 4 TABLET | Refills: 0 | Status: SHIPPED | OUTPATIENT
Start: 2024-08-18 | End: 2024-10-07

## 2024-08-19 LAB — LABCORP INTERFACED MISCELLANEOUS TEST RESULT: ABNORMAL

## 2024-09-10 ENCOUNTER — TELEPHONE (OUTPATIENT)
Dept: ENDOCRINOLOGY | Facility: CLINIC | Age: 34
End: 2024-09-10
Payer: COMMERCIAL

## 2024-09-10 NOTE — TELEPHONE ENCOUNTER
Prior Authorization Specialty Medication Request    Medication/Dose: testosterone (ANDROGEL 1.62 % PUMP) 20.25 MG/ACT gel   Sig - Route: Place 3 Pump (60.75 mg) onto the skin daily Apply from dispenser to clean, dry, intact skin of the upper arms and shoulders. - Transdermal   Diagnosis and ICD code (if different than what is on RX):    New/renewal/insurance change PA/secondary ins. PA:  Previously Tried and Failed:      Important Lab Values:       Rationale: hypogonadism is 2/2 to CVA he suffered as a result of APLS back in 2018.  he had headache, vision and speech abnormalities, sx improved with therapy.       Insurance   Primary:   Insurance ID:      Secondary (if applicable):  Insurance ID:      Pharmacy Information (if different than what is on RX)  Name:    Phone:    Fax:

## 2024-09-12 NOTE — TELEPHONE ENCOUNTER
PA Initiation    Medication: TESTOSTERONE 20.25 MG/ACT (1.62%) TD GEL  Insurance Company: Dr. TATTOFF - Phone [phone] Fax [phone]:  [facility]  Pharmacy Filling the Rx: [id number] - [place], [place] - [address] AT [facility] [address]  Filling Pharmacy Phone: [phone]  Filling Pharmacy Fax:    Start Date: 9/12/2024    QS NOT YET AVAILABLE - CN NOT SENT

## 2024-09-18 ENCOUNTER — TELEPHONE (OUTPATIENT)
Dept: ENDOCRINOLOGY | Facility: CLINIC | Age: 34
End: 2024-09-18

## 2024-09-19 NOTE — TELEPHONE ENCOUNTER
Darrel from [facility] is letting us know that they did not receive any PA for the testosterone, he stated it could be faxed over to # [phone]. Please advise.

## 2024-09-19 NOTE — TELEPHONE ENCOUNTER
Prior Authorization Not Needed per Insurance I called to follow up 09/19/2024. Rep. Told me that there is a PA in place through 04/11/225.  She did see that the pharmacy ran this on 09/04/2024 and received a paid claim, and then they reversed it. She does not see that it has been run since then.  I left a detailed message on pharmacy voice mail to try to rebill this.  I also gave the billing information in case they have been billing to the wrong insurance. The pharmacy is to call the help desk if they are having trouble getting a paid claim.  Medication: TESTOSTERONE 20.25 MG/ACT (1.62%) TD GEL  Insurance Company: [employer] - Phone [phone] Fax [phone]:  [facility]  Expected CoPay: $    Pharmacy Filling the Rx: [facility] #[id number] - [place], [place] - [address] [place] [address] [address] AT [facility]  Pharmacy Notified: Yes, left a detailed message.  Patient Notified: The pharmacy will notify the patient.  This PA is from trying to submit again on 09/19/2024.

## 2024-09-24 DIAGNOSIS — Z86.73 HISTORY OF CVA (CEREBROVASCULAR ACCIDENT): ICD-10-CM

## 2024-09-24 DIAGNOSIS — Z79.01 CHRONIC ANTICOAGULATION: ICD-10-CM

## 2024-09-24 DIAGNOSIS — Z86.711 HISTORY OF PULMONARY EMBOLISM: ICD-10-CM

## 2024-09-24 DIAGNOSIS — D68.61 ANTIPHOSPHOLIPID ANTIBODY SYNDROME (H): ICD-10-CM

## 2024-09-24 RX ORDER — WARFARIN SODIUM 10 MG/1
TABLET ORAL
Qty: 90 TABLET | Refills: 1 | Status: SHIPPED | OUTPATIENT
Start: 2024-09-24

## 2024-09-24 NOTE — TELEPHONE ENCOUNTER
ANTICOAGULATION MANAGEMENT:  Medication Refill    Anticoagulation Summary  As of 8/15/2024      Warfarin maintenance plan:  15 mg (5 mg x 1 and 10 mg x 1) every Wed, Sat; 12.5 mg (5 mg x 0.5 and 10 mg x 1) all other days   Next INR check:  9/26/2024   Target end date:  Indefinite    Indications    Antiphospholipid antibody syndrome (H24) [D68.61]  Chronic anticoagulation [Z79.01]  Antiphospholipid syndrome (H24) [D68.61]  History of pulmonary embolism [Z86.711]  History of CVA (cerebrovascular accident) [Z86.73]                 Anticoagulation Care Providers       Provider Role Specialty Phone number    Yara Faust MD Referring Family Medicine [phone]            Refill Criteria    Visit with referring provider/group: Meets criteria: visit within referring provider group in the last 15 months on 4/23/24    [facility] referral last signed: 08/20/2024; within last year: Yes    Lab monitoring not exceeding 2 weeks overdue: Yes    Tone meets all criteria for refill. Rx instructions and quantity match patient's current dosing plan. Warfarin 90 day supply with 1 refill granted per [facility] protocol     Abby Chatman RN  Anticoagulation Clinic

## 2024-09-25 ENCOUNTER — LAB (OUTPATIENT)
Dept: LAB | Facility: CLINIC | Age: 34
End: 2024-09-25
Payer: COMMERCIAL

## 2024-09-25 DIAGNOSIS — Z86.73 HISTORY OF CVA (CEREBROVASCULAR ACCIDENT): ICD-10-CM

## 2024-09-25 DIAGNOSIS — I74.9 EMBOLISM AND THROMBOSIS OF ARTERY (H): ICD-10-CM

## 2024-09-25 DIAGNOSIS — R79.89 LOW SERUM TESTOSTERONE: ICD-10-CM

## 2024-09-25 DIAGNOSIS — E22.1 HYPERPROLACTINEMIA (H): ICD-10-CM

## 2024-09-25 DIAGNOSIS — Z79.01 CHRONIC ANTICOAGULATION: ICD-10-CM

## 2024-09-25 LAB — PROLACTIN SERPL 3RD IS-MCNC: 27 NG/ML (ref 4–15)

## 2024-09-25 PROCEDURE — 84146 ASSAY OF PROLACTIN: CPT

## 2024-09-25 PROCEDURE — 85260 CLOT FACTOR X STUART-POWER: CPT

## 2024-09-25 PROCEDURE — 36415 COLL VENOUS BLD VENIPUNCTURE: CPT

## 2024-09-25 NOTE — TELEPHONE ENCOUNTER
[facility] Call Center    Phone Message    May a detailed message be left on voicemail: yes     Reason for Call: Other: Per pt would like if someone can give him a update of what is going on with the PA. Please and thank you.       Action Taken: Message routed to:  [facility] ENDO    Travel Screening: Not Applicable     Date of Service:

## 2024-09-25 NOTE — TELEPHONE ENCOUNTER
I don't see that a PA was needed but he was due for a refill. I sent refill request to Dr Naranjo to manoj Leach RN on 9/25/2024 at 4:44 PM

## 2024-09-26 ENCOUNTER — ANTICOAGULATION THERAPY VISIT (OUTPATIENT)
Dept: ANTICOAGULATION | Facility: CLINIC | Age: 34
End: 2024-09-26
Payer: COMMERCIAL

## 2024-09-26 ENCOUNTER — TELEPHONE (OUTPATIENT)
Dept: ENDOCRINOLOGY | Facility: CLINIC | Age: 34
End: 2024-09-26
Payer: COMMERCIAL

## 2024-09-26 DIAGNOSIS — Z86.73 HISTORY OF CVA (CEREBROVASCULAR ACCIDENT): ICD-10-CM

## 2024-09-26 DIAGNOSIS — I74.9 EMBOLISM AND THROMBOSIS OF ARTERY (H): ICD-10-CM

## 2024-09-26 DIAGNOSIS — D68.61 ANTIPHOSPHOLIPID SYNDROME (H): ICD-10-CM

## 2024-09-26 DIAGNOSIS — Z86.711 HISTORY OF PULMONARY EMBOLISM: ICD-10-CM

## 2024-09-26 DIAGNOSIS — E78.5 HYPERLIPIDEMIA LDL GOAL <100: ICD-10-CM

## 2024-09-26 DIAGNOSIS — D68.61 ANTIPHOSPHOLIPID ANTIBODY SYNDROME (H): Primary | ICD-10-CM

## 2024-09-26 DIAGNOSIS — Z79.01 CHRONIC ANTICOAGULATION: ICD-10-CM

## 2024-09-26 LAB — FACT X ACT/NOR PPP CHRO: 24 % (ref 70–130)

## 2024-09-26 RX ORDER — ATORVASTATIN CALCIUM 10 MG/1
10 TABLET, FILM COATED ORAL DAILY
Qty: 90 TABLET | Refills: 2 | Status: SHIPPED | OUTPATIENT
Start: 2024-09-26

## 2024-09-26 NOTE — TELEPHONE ENCOUNTER
Prior Authorization Retail Medication Request    Medication/Dose: Testosterone. ANDROGEL 1.62 % PUMP     Diagnosis and ICD code (if different than what is on RX):  R79.89     Previously Tried and Failed:    Rationale:    Low serum testosterone          Insurance   Primary:   Insurance ID:      Secondary (if applicable):  Insurance ID:      Pharmacy Information (if different than what is on RX)  Name:    Phone:    Fax:    Clinic Information  Preferred routing pool for dept communication:

## 2024-09-26 NOTE — PROGRESS NOTES
ANTICOAGULATION MANAGEMENT     Tone PARDO Sidneyjere 34 year old male is on warfarin with therapeutic result. (Goal Chromogenic Factor X 40-20%)    Recent labs: (last 7 days)     09/25/24  0953   [id number] 24*       ASSESSMENT     Warfarin Lab Questionnaire    Warfarin Doses Last 7 Days      9/25/2024     9:10 AM   Dose in Tablet or Mg   TAB or MG? milligram (mg)     Pt Rptd Dose SUNDAY MONDAY TUESDAY WED THURS FRIDAY SATURDAY 9/25/2024   9:10 AM 12.5 12.15 12.5 15 12.5 12.5 15         9/25/2024   Warfarin Lab Questionnaire   Missed doses within past 14 days? No   Changes in diet or alcohol within past 14 days? No   Medication changes since last result? No   Injuries or illness since last result? No   New shortness of breath, severe headaches or sudden changes in vision since last result? No   Abnormal bleeding since last result? No   Upcoming surgery, procedure? No        Previous result: Therapeutic last 2(+) visits  Additional findings: None     PLAN     Recommended plan for no diet, medication or health factor changes affecting result    Dosing Instructions: Continue your current warfarin dose 15mg every Wednesday & Saturday; 12.5mg all other days of the week  with next Chromogenic Factor X in 6 weeks       Telephone call with Tone who verbalizes understanding and agrees to plan    Lab visit scheduled    Education provided:   Goal range and lab monitoring: goal range and significance of current result and Importance of therapeutic range    Plan made per [facility] anticoagulation protocol    Jed Hilario RN  9/26/2024  Anticoagulation Clinic  [employer] for routing messages: waldo CEKERTWN  [facility] patient phone line: [phone]

## 2024-09-28 RX ORDER — TESTOSTERONE 1.62 MG/G
3 GEL TRANSDERMAL DAILY
Qty: 150 G | Refills: 5 | Status: SHIPPED | OUTPATIENT
Start: 2024-09-28 | End: 2024-10-07

## 2024-09-30 NOTE — TELEPHONE ENCOUNTER
PA Initiation    Medication: ANDROGEL 20.25 MG/1.25GM (1.62%) TD GEL  Insurance Company: [facility] - Phone [phone] Fax [phone]  Pharmacy Filling the Rx:    Filling Pharmacy Phone:    Filling Pharmacy Fax:    Start Date: 9/30/2024

## 2024-10-01 NOTE — TELEPHONE ENCOUNTER
I called the pharmacy who couldn't get it to go through the insurance before. They now [place]   so whatever process happened  at least made it possible to fill. I did notify Tone  of the situation with no [place]  working. Rachel Leach, RN on 10/1/2024 at 11:10 AM   .

## 2024-10-07 ENCOUNTER — VIRTUAL VISIT (OUTPATIENT)
Dept: ENDOCRINOLOGY | Facility: CLINIC | Age: 34
End: 2024-10-07
Payer: COMMERCIAL

## 2024-10-07 ENCOUNTER — TELEPHONE (OUTPATIENT)
Dept: ENDOCRINOLOGY | Facility: CLINIC | Age: 34
End: 2024-10-07

## 2024-10-07 DIAGNOSIS — E22.1 HYPERPROLACTINEMIA (H): ICD-10-CM

## 2024-10-07 DIAGNOSIS — E66.811 CLASS 1 OBESITY WITH SERIOUS COMORBIDITY AND BODY MASS INDEX (BMI) OF 34.0 TO 34.9 IN ADULT, UNSPECIFIED OBESITY TYPE: Primary | ICD-10-CM

## 2024-10-07 DIAGNOSIS — R79.89 LOW SERUM TESTOSTERONE: ICD-10-CM

## 2024-10-07 PROCEDURE — G2211 COMPLEX E/M VISIT ADD ON: HCPCS | Mod: 95 | Performed by: STUDENT IN AN ORGANIZED HEALTH CARE EDUCATION/TRAINING PROGRAM

## 2024-10-07 PROCEDURE — 99215 OFFICE O/P EST HI 40 MIN: CPT | Mod: 95 | Performed by: STUDENT IN AN ORGANIZED HEALTH CARE EDUCATION/TRAINING PROGRAM

## 2024-10-07 RX ORDER — TESTOSTERONE 1.62 MG/G
3 GEL TRANSDERMAL DAILY
Qty: 30 G | Refills: 0 | Status: SHIPPED | OUTPATIENT
Start: 2024-10-07 | End: 2024-11-12

## 2024-10-07 RX ORDER — CABERGOLINE 0.5 MG/1
0.25 TABLET ORAL WEEKLY
Qty: 6 TABLET | Refills: 3 | Status: SHIPPED | OUTPATIENT
Start: 2024-10-07 | End: 2024-11-13

## 2024-10-07 NOTE — PATIENT INSTRUCTIONS
It was nice seeing you.    - I refilled your cabergoline and testosterone. No changes.  - get prolactin check in 3month  - testosterone labs in 6 month  - start Zepbound for wt loss at 2.5 mg weekly. Send me my chart message and update me on how it is going.

## 2024-10-07 NOTE — TELEPHONE ENCOUNTER
Left Voicemail (1st Attempt) for the patient to call back and schedule the following:    Appointment type: return  Provider: dr. Roche   Return date: 1/7/2025  Specialty phone number: [phone]   Additonal Notes: Return in about 3 months (around 1/7/2025)     [facility]   Orthopedics, Podiatry, Sports Medicine, Ent ,Eye , Audiology, Adult Endocrine & Diabetes, Nutrition & Medication Therapy Management Specialties   [facility]

## 2024-10-07 NOTE — PROGRESS NOTES
Endocrinology Clinic Visit       Video-Visit Details    Type of service:  Video Visit  Joined the call at 10/7/2024, 8:04:18 am.  Left the call at 10/7/2024, 8:33:11 am.      Originating Location (pt. Location): Home        Distant Location (provider location):  Off-site    Mode of Communication:  Video Conference via [employer]    Physician has received verbal consent for a Video Visit from the patient? Yes    I spent a total of 45 minutes on the date of encounter reviewing medical records, evaluating the patient, coordinating care and documenting in the EHR, as detailed above.      NAME:  Tone Marley  PCP:  Yara Faustfederico  MRN:  [id number]  Reason for Consult: Hypogonadism  Requesting Provider:  No ref. provider found    Chief Complaint     Chief Complaint   Patient presents with    RECHECK     6 mo follow-up        History of Present Illness     Tone Marley is a 34 year old male who is seen in video visit for hypogonadism.    He was previously seen by Dr. Casey in October 2019 and with Dr. Contreras. He established with me 4/2024.    He has a hx of antiphospholipid syndrome and is on anticoagulation for this. This was diagnosed at age 13 after he had a DVT in the lower extremity and PE.  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  he had headache, vision and speech abnormalities, sx improved with therapy.     Prior to therapy with testosterone he was having low libido, low energy, and difficulty losing weight.  I reviewed all his testosterone levels. Low Testosterone Total 200 (240-950) on 8/20/19 at 9:28am, 164 on 8/7/18 at 11:15 am, 124 on 3/8/18 at 7:47 am. He was seen by Dr. Zuleta 10/2019, pituitary access were checked:      Latest Reference Range & Units 10/30/19 07:40 10/30/19 07:41   Adrenal Corticotropin <47 pg/mL  48 (H)   Cortisol Serum 4 - 22  ug/dL  20.0   Free Testosterone Calculated 4.7 - 24.4 ng/dL 7.88    FSH 0.7 - 10.8 IU/L 2.8    Growth Hormone <1.3 ug/L <0.1    Prolactin 2 - 18 ug/L 25 (H)    PSA 0 - 4 ug/L 0.59    T4 Free 0.76 - 1.46 ng/dL 1.03    Testosterone Total 240 - 950 ng/dL 259    TSH 0.40 - 4.00 mU/L 4.44 (H)         Brain Mri 1/2020: showed a normal pituitary gland.  Repeat MRI 10/2023: no evidence of pituitary adenoma.    He initial started on T injections end of 2019 then switched to androgel.     He feels good since being on testosterone. He had LUTS when he was started on T injections in 2019. He also does not like needles and that's why he prefers topical.    No other side effects since starting T.    Had low-normal testosterone on 2 pumps/day androgel but appears to be supratherapeutic on 4 ppd.  he had discussion with Dr. Contreras about labs on topical are not always as consistent but this was fairly elevated so we will plan to lower dose and recheck. Recheck T level 11/2023  was 505.    Never fathered any children. He is interested in fertility in the near future.  He had concerns about his last FSH and LH being suppressed on 5/2023.    Last T level 8/2024 total 367 and free 9.97.    Today he reported feeling good. No concerns except for his wt.      # elevated PRL  Elevated PRL level without galactorrhea or gynecomastia.  No adenoma seen on MRI in 2020 when level was around 25.  PRL trended up to 55 on 9/2023 and 70 on 1/2024. PRL 73 on 8/2024 no macroprolactin.    He started cabergoline 0.25 mg weekly on 8/2024.     Repeat PRL 27 on 9/2024.    Denied side effects on cabergoline.      # obesity class 1  He continues to gain wt despite regular exercising, was 245 lbs 4/2024.   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     Problem List     Patient Active Problem List   Diagnosis    Antiphospholipid syndrome (H)    Mixed hyperlipidemia    Benign essential  hypertension    Attention deficit hyperactivity disorder (ADHD), predominantly inattentive type    History of pulmonary embolism    Chronic anticoagulation    Class 2 obesity due to excess calories without serious comorbidity with body mass index (BMI) of 37.0 to 37.9 in adult    Obesity (BMI 35.0-39.9) with comorbidity (H)    Antiphospholipid antibody syndrome (H)    History of CVA (cerebrovascular accident)    Low serum testosterone    Rosacea    Hyperprolactinemia (H)    Embolism and thrombosis of artery (H)        Medications     Current Outpatient Medications   Medication Sig Dispense Refill    cabergoline (DOSTINEX) 0.5 MG tablet Take 0.5 tablets (0.25 mg) by mouth once a week. 6 tablet 3    testosterone (ANDROGEL 1.62 % PUMP) 20.25 MG/ACT gel Place 3 Pump (60.75 mg) onto the skin daily. Apply from dispenser to clean, dry, intact skin of the upper arms and shoulders. 30 g 0    tirzepatide-Weight Management (ZEPBOUND) 2.5 MG/0.5ML prefilled pen Inject 0.5 mLs (2.5 mg) subcutaneously every 7 days. 2 mL 1    ASPIRIN PO Take 81 mg by mouth daily      atorvastatin (LIPITOR) 10 MG tablet TAKE 1 TABLET(10 MG) BY MOUTH DAILY 90 tablet 2    hydrochlorothiazide (HYDRODIURIL) 25 MG tablet Take 1 tablet (25 mg) by mouth daily 90 tablet 3    losartan (COZAAR) 100 MG tablet Take 1 tablet (100 mg) by mouth daily 90 tablet 3    Omega-3 Fatty Acids (FISH OIL PO)       warfarin ANTICOAGULANT (COUMADIN) 10 MG tablet Take one tab daily. Or as directed by INR clinic. 90 tablet 1    warfarin ANTICOAGULANT (COUMADIN) 5 MG tablet Take 1/2 tablet by mouth daily along with 10 mg tablet for a total of 12.5 mg daily. Adjust dose based on INR results as directed. 90 tablet 3     No current facility-administered medications for this visit.        Allergies     Allergies   Allergen Reactions    Nkda [No Known Drug Allergy]        Medical / Surgical History     Past Medical History:   Diagnosis Date    ADHD (attention deficit hyperactivity  disorder) 6/27/2012    Antiphospholipid antibody syndromes     at age 13    Antiphospholipid syndrome (H) 2/2/2012    On anticoagulation coumdin 10 mg once daily      Cerebral infarction (H) 7/1/18    Vertebral artery dissection    History of pulmonary embolism 9/2/2013    Was diagnosed  Positive CT scan in emergency department   now Under care of Dr Turcios at [facility],[phone] His nurse personal line [phone] Was on lovenox for 5 months  Now  For past one month on xareltlo 20 mg once daily and plaqunil twice daily      Hyperlipidemia LDL goal <130 6/11/2013    Hypertension goal BP (blood pressure) < 140/90 6/19/12    Hypertension, uncontrolled 6/27/2012    Postphlebitic syndrome 12/9/2014    Prehypertension 2/2/2012    Pulmonary embolism (H)     at age 13    Pulmonary embolism (H) 1/11/2015     Past Surgical History:   Procedure Laterality Date    wisdom teeth extraction         Social History     Social History     Socioeconomic History    Marital status: Single     Spouse name: Not on file    Number of children: Not on file    Years of education: Not on file    Highest education level: Not on file   Occupational History     Employer: [employer]   Tobacco Use    Smoking status: Never    Smokeless tobacco: Former     Types: Chew     Quit date: 07/2018    Tobacco comments:     Chew maybe once per week.   Vaping Use    Vaping status: Never Used   Substance and Sexual Activity    Alcohol use: Yes     Comment: occasional    Drug use: No    Sexual activity: Yes     Partners: Female     Birth control/protection: I.U.D.   Other Topics Concern    Parent/sibling w/ CABG, MI or angioplasty before 65F 55M? 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 Paternal Uncle 42    Other Cancer Maternal Grandmother         Lung cancer (non-smoker)       ROS     12 ROS completed, pertinent positive and negative in HPI    Physical Exam   There were no vitals taken for this visit.   GENERAL: alert and no distress  EYES: Eyes grossly normal to inspection.  No discharge or erythema, or obvious scleral/conjunctival abnormalities.  RESP: No audible wheeze, cough, or visible cyanosis.    SKIN: Visible skin clear. No significant rash, abnormal pigmentation or lesions.  NEURO: Cranial nerves grossly intact.  Mentation and speech appropriate for age.  PSYCH: Appropriate affect, tone, and pace of words     Labs/Imaging     Pertinent Labs were reviewed and updated in EPIC and discussed briefly.  Radiology Results were  reviewed and updated in EPIC and discussed briefly.    Summary of recent findings:   Lab Results   Component Value Date    A1C 4.9 05/31/2022       TSH   Date Value Ref Range Status   08/14/2024 4.56 (H) 0.30 - 4.20 uIU/mL Final   01/10/2024 1.98 0.30 - 4.20 uIU/mL Final   11/08/2023 5.08 (H) 0.30 - 4.20 uIU/mL Final   12/27/2022 4.34 (H) 0.30 - 4.20 uIU/mL Final   10/30/2019 4.44 (H) 0.40 - 4.00 mU/L Final   02/21/2018 3.41 0.40 - 4.00 mU/L Final   08/18/2016 1.86 0.40 - 4.00 mU/L Final     T4 Free   Date Value Ref Range Status   10/30/2019 1.03 0.76 - 1.46 ng/dL Final     Free T4   Date Value Ref Range Status   08/14/2024 1.27 0.90  "- 1.70 ng/dL Final   11/08/2023 1.30 0.90 - 1.70 ng/dL Final   12/27/2022 1.20 0.90 - 1.70 ng/dL Final       Creatinine   Date Value Ref Range Status   05/15/2024 1.02 0.67 - 1.17 mg/dL Final   09/22/2020 1.11 0.66 - 1.25 mg/dL Final       Recent Labs   Lab Test 04/26/23  0858 01/05/22  0824   CHOL 136 155   HDL 44 46   LDL 73 81   TRIG 93 138       No results found for: \"[id number]\", \"[id number]\", \"[id number]\"    I personally reviewed the patient's outside records from [facility] EMR and Care Everywhere. Summary of pertinent findings in HPI.    Impression / Plan       1) hypogonadotropic Hypogonadism  2) hyperprolactinemia  3) interested in fertility  Initial testing with low normal T level and inappropriately normal gonadotropins, thought to be related to his CVA.  He did have slightly elevated prolactin which has been trending up with known normal pituitary on imaging.He has been on testosterone replacement since 2019, he is interested in future fertility.      His prolactin level has been trending up, although there is no lesion on the pituitary on most recent brain MRI this still could be a microadenoma not seen on imaging. Started on cabergoline low dose on 8/2024. Responded well PRL on recheck 9/2024. We discussed treatment for 2 years in order to increase chance of remission.     PRL in 3 month  Testosterone levels, PSA and hematocrit in 6 month     4) subclinical hypothyroidism  Very mild subclinical hypothyroidism.  He reported he did not tolerate levothyroxine and prefers to stay off of it which is reasonable given his TSH mildly elevated and less than 10.  Continue to monitor.    5) obesity class 1 , BMI 34  6) hx of CVA  He failed lifestyle changes for wt. We would like to be on zepbound. He has no contraindication, no FH of MTC and no hx of pancreatitis. We discussed side effects. We discussed monthly titration. He might need to establish with wt management clinic. He also needs close monitoring of his " INR.    Test and/or medications prescribed today:  Orders Placed This Encounter   Procedures    Hemoglobin and hematocrit    Prolactin    Testosterone Free and Total         Follow up: 4 month    The longitudinal plan of care for the diagnosis(es)/condition(s) as documented were addressed during this visit. Due to the added complexity in care, I will continue to support Tone in the subsequent management and with ongoing continuity of care.    Toshia Roche MD  Endocrinology, Diabetes and Metabolism  [facility]

## 2024-10-07 NOTE — TELEPHONE ENCOUNTER
PA Initiation    Medication: ZEPBOUND 2.5 MG/0.5ML SC SOAJ  Insurance Company: [employer] - Phone [phone] Fax [phone]  Pharmacy Filling the Rx: [id number] - [place], [place] - [address] [place] [address] [address] AT [facility] & [place]  Filling Pharmacy Phone:    Filling Pharmacy Fax:    Start Date: 10/7/2024       [id number]

## 2024-10-07 NOTE — NURSING NOTE
Current patient location: [address]   [facility] [address]    Is the patient currently in the state of [place]? YES    Visit mode:VIDEO    If the visit is dropped, the patient can be reconnected by: VIDEO VISIT: Send to e-mail at: [email or web address]    Will anyone else be joining the visit? NO  (If patient encounters technical issues they should call [phone]    Are changes needed to the allergy or medication list? Pt stated no med changes    Are refills needed on medications prescribed by this physician? Discuss with provider- cabergoline, testosterone     Rooming Documentation:  Not applicable    Reason for visit: RECHECK (6 mo follow-up )    Carol DOAN       104

## 2024-10-07 NOTE — LETTER
10/7/2024      Tone Marley  [address]  [facility] [address]      Dear Colleague,    Thank you for referring your patient, Tone Marley, to the [facility]. Please see a copy of my visit note below.    Endocrinology Clinic Visit       Video-Visit Details    Type of service:  Video Visit  Joined the call at 10/7/2024, 8:04:18 am.  Left the call at 10/7/2024, 8:33:11 am.      Originating Location (pt. Location): Home        Distant Location (provider location):  Off-site    Mode of Communication:  Video Conference via [facility]    Physician has received verbal consent for a Video Visit from the patient? Yes    I spent a total of 45 minutes on the date of encounter reviewing medical records, evaluating the patient, coordinating care and documenting in the EHR, as detailed above.      NAME:  Tone Marley  PCP:  Yara Faust  MRN:  [id number]  Reason for Consult: Hypogonadism  Requesting Provider:  No ref. provider found    Chief Complaint     Chief Complaint   Patient presents with     RECHECK     6 mo follow-up        History of Present Illness     Tone Marley is a 34 year old male who is seen in video visit for hypogonadism.    He was previously seen by Dr. Casey in October 2019 and with Dr. Contreras. He established with me 4/2024.    He has a hx of antiphospholipid syndrome and is on anticoagulation for this. This was diagnosed at age 13 after he had a DVT in the lower extremity and PE.  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  he had headache, vision and speech abnormalities, sx improved with therapy.     Prior to therapy with testosterone he was having low libido, low energy, and difficulty losing weight.  I reviewed all his testosterone levels. Low Testosterone Total 200 (240-950) on 8/20/19 at  9:28am, 164 on 8/7/18 at 11:15 am, 124 on 3/8/18 at 7:47 am. He was seen by Dr. Zuleta 10/2019, pituitary access were checked:      Latest Reference Range & Units 10/30/19 07:40 10/30/19 07:41   Adrenal Corticotropin <47 pg/mL  48 (H)   Cortisol Serum 4 - 22 ug/dL  20.0   Free Testosterone Calculated 4.7 - 24.4 ng/dL 7.88    FSH 0.7 - 10.8 IU/L 2.8    Growth Hormone <1.3 ug/L <0.1    Prolactin 2 - 18 ug/L 25 (H)    PSA 0 - 4 ug/L 0.59    T4 Free 0.76 - 1.46 ng/dL 1.03    Testosterone Total 240 - 950 ng/dL 259    TSH 0.40 - 4.00 mU/L 4.44 (H)         Brain Mri 1/2020: showed a normal pituitary gland.  Repeat MRI 10/2023: no evidence of pituitary adenoma.    He initial started on T injections end of 2019 then switched to androgel.     He feels good since being on testosterone. He had LUTS when he was started on T injections in 2019. He also does not like needles and that's why he prefers topical.    No other side effects since starting T.    Had low-normal testosterone on 2 pumps/day androgel but appears to be supratherapeutic on 4 ppd.  he had discussion with Dr. Contreras about labs on topical are not always as consistent but this was fairly elevated so we will plan to lower dose and recheck. Recheck T level 11/2023  was 505.    Never fathered any children. He is interested in fertility in the near future.  He had concerns about his last FSH and LH being suppressed on 5/2023.    Last T level 8/2024 total 367 and free 9.97.    Today he reported feeling good. No concerns except for his wt.      # elevated PRL  Elevated PRL level without galactorrhea or gynecomastia.  No adenoma seen on MRI in 2020 when level was around 25.  PRL trended up to 55 on 9/2023 and 70 on 1/2024. PRL 73 on 8/2024 no macroprolactin.    He started cabergoline 0.25 mg weekly on 8/2024.     Repeat PRL 27 on 9/2024.    Denied side effects on cabergoline.      # obesity class 1  He continues to gain wt despite regular exercising, was 245 lbs 4/2024.    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     Problem List     Patient Active Problem List   Diagnosis     Antiphospholipid syndrome (H)     Mixed hyperlipidemia     Benign essential hypertension     Attention deficit hyperactivity disorder (ADHD), predominantly inattentive type     History of pulmonary embolism     Chronic anticoagulation     Class 2 obesity due to excess calories without serious comorbidity with body mass index (BMI) of 37.0 to 37.9 in adult     Obesity (BMI 35.0-39.9) with comorbidity (H)     Antiphospholipid antibody syndrome (H)     History of CVA (cerebrovascular accident)     Low serum testosterone     Rosacea     Hyperprolactinemia (H)     Embolism and thrombosis of artery (H)        Medications     Current Outpatient Medications   Medication Sig Dispense Refill     cabergoline (DOSTINEX) 0.5 MG tablet Take 0.5 tablets (0.25 mg) by mouth once a week. 6 tablet 3     testosterone (ANDROGEL 1.62 % PUMP) 20.25 MG/ACT gel Place 3 Pump (60.75 mg) onto the skin daily. Apply from dispenser to clean, dry, intact skin of the upper arms and shoulders. 30 g 0     tirzepatide-Weight Management (ZEPBOUND) 2.5 MG/0.5ML prefilled pen Inject 0.5 mLs (2.5 mg) subcutaneously every 7 days. 2 mL 1     ASPIRIN PO Take 81 mg by mouth daily       atorvastatin (LIPITOR) 10 MG tablet TAKE 1 TABLET(10 MG) BY MOUTH DAILY 90 tablet 2     hydrochlorothiazide (HYDRODIURIL) 25 MG tablet Take 1 tablet (25 mg) by mouth daily 90 tablet 3     losartan (COZAAR) 100 MG tablet Take 1 tablet (100 mg) by mouth daily 90 tablet 3     Omega-3 Fatty Acids (FISH OIL PO)        warfarin ANTICOAGULANT (COUMADIN) 10 MG tablet Take one tab daily. Or as directed by INR clinic. 90 tablet 1     warfarin ANTICOAGULANT (COUMADIN) 5 MG tablet Take 1/2 tablet by mouth daily along with 10 mg tablet for a total of 12.5 mg daily. Adjust dose based on INR results as  directed. 90 tablet 3     No current facility-administered medications for this visit.        Allergies     Allergies   Allergen Reactions     Nkda [No Known Drug Allergy]        Medical / Surgical History     Past Medical History:   Diagnosis Date     ADHD (attention deficit hyperactivity disorder) 6/27/2012     Antiphospholipid antibody syndromes     at age 13     Antiphospholipid syndrome (H) 2/2/2012    On anticoagulation coumdin 10 mg once daily       Cerebral infarction (H) 7/1/18    Vertebral artery dissection     History of pulmonary embolism 9/2/2013    Was diagnosed  Positive CT scan in emergency department   now Under care of Dr Turcios at [facility],[phone] His nurse personal line [phone] Was on lovenox for 5 months  Now  For past one month on xareltlo 20 mg once daily and plaqunil twice daily       Hyperlipidemia LDL goal <130 6/11/2013     Hypertension goal BP (blood pressure) < 140/90 6/19/12     Hypertension, uncontrolled 6/27/2012     Postphlebitic syndrome 12/9/2014     Prehypertension 2/2/2012     Pulmonary embolism (H)     at age 13     Pulmonary embolism (H) 1/11/2015     Past Surgical History:   Procedure Laterality Date     wisdom teeth extraction         Social History     Social History     Socioeconomic History     Marital status: Single     Spouse name: Not on file     Number of children: Not on file     Years of education: Not on file     Highest education level: Not on file   Occupational History     Employer: [employer]   Tobacco Use     Smoking status: Never     Smokeless tobacco: Former     Types: Chew     Quit date: 07/2018     Tobacco comments:     Chew maybe once per week.   Vaping Use     Vaping status: Never Used   Substance and Sexual Activity     Alcohol use: Yes     Comment: occasional     Drug use: No     Sexual activity: Yes     Partners: Female     Birth control/protection: I.U.D.   Other Topics Concern     Parent/sibling w/ CABG, MI or angioplasty before 65F  55M? 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 Paternal Uncle 42     Other Cancer Maternal Grandmother         Lung cancer (non-smoker)       ROS     12 ROS completed, pertinent positive and negative in HPI    Physical Exam   There were no vitals taken for this visit.   GENERAL: alert and no distress  EYES: Eyes grossly normal to inspection.  No discharge or erythema, or obvious scleral/conjunctival abnormalities.  RESP: No audible wheeze, cough, or visible cyanosis.    SKIN: Visible skin clear. No significant rash, abnormal pigmentation or lesions.  NEURO: Cranial nerves grossly intact.  Mentation and speech appropriate for age.  PSYCH: Appropriate affect, tone, and pace of words     Labs/Imaging     Pertinent Labs were reviewed and updated in EPIC and discussed briefly.  Radiology Results were  reviewed and updated in EPIC and discussed briefly.    Summary of recent findings:   Lab Results   Component Value Date    A1C 4.9 05/31/2022       TSH   Date Value Ref Range Status   08/14/2024 4.56 (H) 0.30 - 4.20 uIU/mL Final   01/10/2024 1.98 0.30 - 4.20 uIU/mL Final   11/08/2023 5.08 (H) 0.30 - 4.20 uIU/mL  "Final   12/27/2022 4.34 (H) 0.30 - 4.20 uIU/mL Final   10/30/2019 4.44 (H) 0.40 - 4.00 mU/L Final   02/21/2018 3.41 0.40 - 4.00 mU/L Final   08/18/2016 1.86 0.40 - 4.00 mU/L Final     T4 Free   Date Value Ref Range Status   10/30/2019 1.03 0.76 - 1.46 ng/dL Final     Free T4   Date Value Ref Range Status   08/14/2024 1.27 0.90 - 1.70 ng/dL Final   11/08/2023 1.30 0.90 - 1.70 ng/dL Final   12/27/2022 1.20 0.90 - 1.70 ng/dL Final       Creatinine   Date Value Ref Range Status   05/15/2024 1.02 0.67 - 1.17 mg/dL Final   09/22/2020 1.11 0.66 - 1.25 mg/dL Final       Recent Labs   Lab Test 04/26/23  0858 01/05/22  0824   CHOL 136 155   HDL 44 46   LDL 73 81   TRIG 93 138       No results found for: \"[id number]\", \"[id number]\", \"[id number]\"    I personally reviewed the patient's outside records from [facility] EMR and Care Everywhere. Summary of pertinent findings in HPI.    Impression / Plan       1) hypogonadotropic Hypogonadism  2) hyperprolactinemia  3) interested in fertility  Initial testing with low normal T level and inappropriately normal gonadotropins, thought to be related to his CVA.  He did have slightly elevated prolactin which has been trending up with known normal pituitary on imaging.He has been on testosterone replacement since 2019, he is interested in future fertility.      His prolactin level has been trending up, although there is no lesion on the pituitary on most recent brain MRI this still could be a microadenoma not seen on imaging. Started on cabergoline low dose on 8/2024. Responded well PRL on recheck 9/2024. We discussed treatment for 2 years in order to increase chance of remission.     PRL in 3 month  Testosterone levels, PSA and hematocrit in 6 month     4) subclinical hypothyroidism  Very mild subclinical hypothyroidism.  He reported he did not tolerate levothyroxine and prefers to stay off of it which is reasonable given his TSH mildly elevated and less than 10.  Continue to monitor.    5) " obesity class 1 , BMI 34  6) hx of CVA  He failed lifestyle changes for wt. We would like to be on zepbound. He has no contraindication, no FH of MTC and no hx of pancreatitis. We discussed side effects. We discussed monthly titration. He might need to establish with wt management clinic. He also needs close monitoring of his INR.    Test and/or medications prescribed today:  Orders Placed This Encounter   Procedures     Hemoglobin and hematocrit     Prolactin     Testosterone Free and Total         Follow up: 4 month    The longitudinal plan of care for the diagnosis(es)/condition(s) as documented were addressed during this visit. Due to the added complexity in care, I will continue to support Tone in the subsequent management and with ongoing continuity of care.    Toshia Roche MD  Endocrinology, Diabetes and Metabolism  [facility]      Again, thank you for allowing me to participate in the care of your patient.        Sincerely,        Toshia Roche MD

## 2024-10-08 NOTE — TELEPHONE ENCOUNTER
Prior Authorization Approval    Medication: ZEPBOUND 2.5 MG/0.5ML SC SOAJ  Authorization Effective Date: 9/4/2027  Authorization Expiration Date: 10/7/2025  Approved Dose/Quantity:    Reference #: [id number]   Insurance Company: [employer] - Phone [phone] Fax [phone]  Expected CoPay: $    CoPay Card Available:      Financial Assistance Needed:    Which Pharmacy is filling the prescription: [id number] - [facility] [address] [place] [address] [address] AT [facility]  Pharmacy Notified:  Y  Patient Notified:  JANES

## 2024-10-09 ENCOUNTER — MYC MEDICAL ADVICE (OUTPATIENT)
Dept: ENDOCRINOLOGY | Facility: CLINIC | Age: 34
End: 2024-10-09
Payer: COMMERCIAL

## 2024-10-10 ENCOUNTER — TELEPHONE (OUTPATIENT)
Dept: ENDOCRINOLOGY | Facility: CLINIC | Age: 34
End: 2024-10-10

## 2024-10-10 NOTE — TELEPHONE ENCOUNTER
Prior Authorization Retail Medication Request    Medication/Dose: testosterone (ANDROGEL 1.62 % PUMP) 20.25 MG/ACT    Per patient request, please see [employer] message 10/9/24 if the forms for the prior authorization is needed.    Leena Olivares CMA  Adult Endocrinology  [facility]

## 2024-10-14 NOTE — TELEPHONE ENCOUNTER
Retail Pharmacy Prior Authorization Team   Phone: [phone]    PA Initiation    Medication: TESTOSTERONE 20.25 MG/ACT (1.62%) TD GEL  Insurance Company: [employer] [place] - Phone [phone] Fax [phone]  Pharmacy Filling the Rx: [facility] #[id number] - [place], [place] - [facility] AT [facility] [address]  Filling Pharmacy Phone: [phone]  Filling Pharmacy Fax:    Start Date: 10/15/2024

## 2024-10-17 NOTE — TELEPHONE ENCOUNTER
Hello,    I'm initiating a PA through [employer] [place] [employer] via [employer] however it's asking what the patient's weight and BMI is prior to starting Zepbound.    Thank You!    Christopher Conroy [facility] Pharmacy Liaison  [facility] [email or web address]  Phone: [phone]  Fax: [phone]    [facility]: [id number]

## 2024-10-17 NOTE — TELEPHONE ENCOUNTER
[employer] message sent to patient asking current weight and height. Will then enter in Epic patient reported and calculate BMI.      Ashleigh Drake RN  Endocrine Care Coordinator  [facility]

## 2024-10-17 NOTE — CONFIDENTIAL NOTE
Per patient's [employer] message from 10/17/24 the PA for Zepbound was not completed with the correct insurance. He noted the Testosterone was completed with the correct insurance [place].     Will send to PA team to review.        Ashleigh Drake RN  Endocrine Care Coordinator  [facility]

## 2024-10-18 NOTE — TELEPHONE ENCOUNTER
PA Initiation    Medication: ZEPBOUND 2.5 MG/0.5ML SC SOAJ  Insurance Company: [employer] [place] - Phone [phone] Fax [phone]  Pharmacy Filling the Rx: [facility] #[id number] - [place], [place] - [facility] AT [facility] [address]  Filling Pharmacy Phone:    Filling Pharmacy Fax:    Start Date: 10/18/2024    [id number]

## 2024-10-18 NOTE — TELEPHONE ENCOUNTER
Prior Authorization Approval    PLEASE HAVE PATIENT CALL TO SEE WHY THE CO-PAY IS SO HIGH.    Medication: ZEPBOUND 2.5 MG/0.5ML SC SOAJ  Authorization Effective Date: 9/18/2024  Authorization Expiration Date: 10/18/2025  Approved Dose/Quantity: 2ml per 28 days  Reference #: [id number]   Insurance Company: [employer] [place] - Phone [phone] Fax [phone]  Expected CoPay: $ 817.77  CoPay Card Available: No    Financial Assistance Needed:   Which Pharmacy is filling the prescription: [facility] #[id number] - [place], [place] - [address] AT [facility]  Pharmacy Notified:   Patient Notified:

## 2024-10-24 NOTE — TELEPHONE ENCOUNTER
Prior Authorization Approval    Medication: TESTOSTERONE 20.25 MG/ACT (1.62%) TD GEL  Authorization Effective Date: 9/16/2024  Authorization Expiration Date: 10/16/2025  Approved Dose/Quantity:   Reference #:     Insurance Company: [employer] [place] - Phone [phone] Fax [phone]  Expected CoPay: $    CoPay Card Available:      Financial Assistance Needed:   Which Pharmacy is filling the prescription: [facility] #[id number] - [place], [place] - [address] [place] [address] [address] AT [facility]  Pharmacy Notified: Yes  Patient Notified: **Instructed pharmacy to notify patient when script is ready to /ship.**

## 2024-11-06 ENCOUNTER — ANTICOAGULATION THERAPY VISIT (OUTPATIENT)
Dept: ANTICOAGULATION | Facility: CLINIC | Age: 34
End: 2024-11-06

## 2024-11-06 ENCOUNTER — LAB (OUTPATIENT)
Dept: LAB | Facility: CLINIC | Age: 34
End: 2024-11-06
Payer: COMMERCIAL

## 2024-11-06 DIAGNOSIS — Z86.73 HISTORY OF CVA (CEREBROVASCULAR ACCIDENT): ICD-10-CM

## 2024-11-06 DIAGNOSIS — Z86.711 HISTORY OF PULMONARY EMBOLISM: ICD-10-CM

## 2024-11-06 DIAGNOSIS — Z79.01 CHRONIC ANTICOAGULATION: ICD-10-CM

## 2024-11-06 DIAGNOSIS — I74.9 EMBOLISM AND THROMBOSIS OF ARTERY (H): ICD-10-CM

## 2024-11-06 DIAGNOSIS — D68.61 ANTIPHOSPHOLIPID ANTIBODY SYNDROME (H): Primary | ICD-10-CM

## 2024-11-06 DIAGNOSIS — D68.61 ANTIPHOSPHOLIPID SYNDROME (H): ICD-10-CM

## 2024-11-06 LAB — FACT X ACT/NOR PPP CHRO: 24 % (ref 70–130)

## 2024-11-06 PROCEDURE — 36415 COLL VENOUS BLD VENIPUNCTURE: CPT

## 2024-11-06 PROCEDURE — 85260 CLOT FACTOR X STUART-POWER: CPT

## 2024-11-06 NOTE — PROGRESS NOTES
ANTICOAGULATION MANAGEMENT     Tone Marley 34 year old male is on warfarin with therapeutic result. (Goal Chromogenic Factor X 40-20%)    Recent labs: (last 7 days)     11/06/24  0817   [id number] 24*       ASSESSMENT     Source(s): Chart Review and Patient/Caregiver Call     Warfarin doses taken: Warfarin taken as instructed  Diet: No new diet changes identified  Medication/supplement changes:  started Zepbound 10/7 and is titrating dose upward now  New illness, injury, or hospitalization: No  Signs or symptoms of bleeding or clotting: No  Previous result: Therapeutic last 2(+) visits  Additional findings: None     PLAN     Recommended plan for no diet, medication or health factor changes affecting result    Dosing Instructions: Continue your current warfarin dose 15 mg wed/sat; 12.5 mg all other days  with next Chromogenic Factor X in 3 weeks       Telephone call with Tone who verbalizes understanding and agrees to plan    Lab visit scheduled    Education provided:   Please call back if any changes to your diet, medications or how you've been taking warfarin    Plan made per [facility] anticoagulation protocol    Elias Marquez RN  11/6/2024  Anticoagulation Clinic  [facility] for routing messages: p ANT[facility]AG [facility]  [facility] patient phone line: [phone]

## 2024-11-12 ENCOUNTER — MYC REFILL (OUTPATIENT)
Dept: ENDOCRINOLOGY | Facility: CLINIC | Age: 34
End: 2024-11-12
Payer: COMMERCIAL

## 2024-11-12 DIAGNOSIS — R79.89 LOW SERUM TESTOSTERONE: ICD-10-CM

## 2024-11-12 RX ORDER — TESTOSTERONE 1.62 MG/G
3 GEL TRANSDERMAL DAILY
Qty: 375 G | Refills: 1 | Status: SHIPPED | OUTPATIENT
Start: 2024-11-12

## 2024-11-15 ENCOUNTER — MYC REFILL (OUTPATIENT)
Dept: ENDOCRINOLOGY | Facility: CLINIC | Age: 34
End: 2024-11-15
Payer: COMMERCIAL

## 2024-11-15 DIAGNOSIS — E66.811 CLASS 1 OBESITY WITH SERIOUS COMORBIDITY AND BODY MASS INDEX (BMI) OF 34.0 TO 34.9 IN ADULT, UNSPECIFIED OBESITY TYPE: ICD-10-CM

## 2024-11-27 ENCOUNTER — ANTICOAGULATION THERAPY VISIT (OUTPATIENT)
Dept: ANTICOAGULATION | Facility: CLINIC | Age: 34
End: 2024-11-27

## 2024-11-27 ENCOUNTER — LAB (OUTPATIENT)
Dept: LAB | Facility: CLINIC | Age: 34
End: 2024-11-27
Payer: COMMERCIAL

## 2024-11-27 DIAGNOSIS — Z79.01 CHRONIC ANTICOAGULATION: ICD-10-CM

## 2024-11-27 DIAGNOSIS — D68.61 ANTIPHOSPHOLIPID ANTIBODY SYNDROME (H): Primary | ICD-10-CM

## 2024-11-27 DIAGNOSIS — I74.9 EMBOLISM AND THROMBOSIS OF ARTERY (H): ICD-10-CM

## 2024-11-27 DIAGNOSIS — Z86.73 HISTORY OF CVA (CEREBROVASCULAR ACCIDENT): ICD-10-CM

## 2024-11-27 DIAGNOSIS — Z86.711 HISTORY OF PULMONARY EMBOLISM: ICD-10-CM

## 2024-11-27 DIAGNOSIS — D68.61 ANTIPHOSPHOLIPID SYNDROME (H): ICD-10-CM

## 2024-11-27 LAB — FACT X ACT/NOR PPP CHRO: 23 % (ref 70–130)

## 2024-11-27 PROCEDURE — 85260 CLOT FACTOR X STUART-POWER: CPT

## 2024-11-27 PROCEDURE — 36415 COLL VENOUS BLD VENIPUNCTURE: CPT

## 2024-11-27 NOTE — PROGRESS NOTES
ANTICOAGULATION MANAGEMENT     Tone Marley 34 year old male is on warfarin with therapeutic result. (Goal Chromogenic Factor X 40-20%)    Recent labs: (last 7 days)     11/27/24  0825   [id number] 23*       ASSESSMENT     Warfarin Lab Questionnaire    Warfarin Doses Last 7 Days      11/26/2024    10:09 PM   Dose in Tablet or Mg   TAB or MG? milligram (mg)     Pt Rptd Dose SUNDAY MONDAY TUESDAY WED THURS FRIDAY SATURDAY 11/26/2024  10:09 PM 12.5 12.5 12.5 15 12.5 12.5 15         11/26/2024   Warfarin Lab Questionnaire   Missed doses within past 14 days? No   Changes in diet or alcohol within past 14 days? No   Medication changes since last result? No   Injuries or illness since last result? No   New shortness of breath, severe headaches or sudden changes in vision since last result? No   Abnormal bleeding since last result? No   Upcoming surgery, procedure? No        Previous result: Therapeutic last 2(+) visits  Additional findings: None     PLAN     Recommended plan for no diet, medication or health factor changes affecting result    Dosing Instructions: Continue your current warfarin dose 15mg Wednesday and Saturday and 12.5mg all other days  with next Chromogenic Factor X in 5 weeks       Telephone call with Tone who verbalizes understanding and agrees to plan    Lab visit scheduled    Education provided:   Goal range and lab monitoring: goal range and significance of current result  Contact [phone] with any changes, questions or concerns.     Plan made per [facility] anticoagulation protocol    Jaja Oliveira RN  11/27/2024  Anticoagulation Clinic  [employer] for routing messages: p ANT[facility]AG [facility]  [facility] patient phone line: [phone]

## 2024-12-12 ENCOUNTER — MYC REFILL (OUTPATIENT)
Dept: ENDOCRINOLOGY | Facility: CLINIC | Age: 34
End: 2024-12-12
Payer: COMMERCIAL

## 2024-12-12 DIAGNOSIS — E66.811 CLASS 1 OBESITY WITH SERIOUS COMORBIDITY AND BODY MASS INDEX (BMI) OF 34.0 TO 34.9 IN ADULT, UNSPECIFIED OBESITY TYPE: ICD-10-CM

## 2025-01-02 ENCOUNTER — LAB (OUTPATIENT)
Dept: LAB | Facility: CLINIC | Age: 35
End: 2025-01-02
Payer: COMMERCIAL

## 2025-01-02 ENCOUNTER — ANTICOAGULATION THERAPY VISIT (OUTPATIENT)
Dept: ANTICOAGULATION | Facility: CLINIC | Age: 35
End: 2025-01-02

## 2025-01-02 DIAGNOSIS — Z86.73 HISTORY OF CVA (CEREBROVASCULAR ACCIDENT): ICD-10-CM

## 2025-01-02 DIAGNOSIS — I74.9 EMBOLISM AND THROMBOSIS OF ARTERY (H): ICD-10-CM

## 2025-01-02 DIAGNOSIS — D68.61 ANTIPHOSPHOLIPID ANTIBODY SYNDROME (H): Primary | ICD-10-CM

## 2025-01-02 DIAGNOSIS — Z79.01 CHRONIC ANTICOAGULATION: ICD-10-CM

## 2025-01-02 DIAGNOSIS — Z86.711 HISTORY OF PULMONARY EMBOLISM: ICD-10-CM

## 2025-01-02 DIAGNOSIS — E66.811 CLASS 1 OBESITY WITH SERIOUS COMORBIDITY AND BODY MASS INDEX (BMI) OF 34.0 TO 34.9 IN ADULT, UNSPECIFIED OBESITY TYPE: ICD-10-CM

## 2025-01-02 DIAGNOSIS — D68.61 ANTIPHOSPHOLIPID SYNDROME (H): ICD-10-CM

## 2025-01-02 LAB
FACT X ACT/NOR PPP CHRO: 16 % (ref 70–130)
PROLACTIN SERPL 3RD IS-MCNC: 19 NG/ML (ref 4–15)

## 2025-01-02 NOTE — PROGRESS NOTES
ANTICOAGULATION MANAGEMENT     Tone Marley 34 year old male is on warfarin with supratherapeutic result. (Goal Chromogenic Factor X 40-20%)    Recent labs: (last 7 days)     01/02/25  0848   [id number] 16*       ASSESSMENT     Warfarin Lab Questionnaire    Warfarin Doses Last 7 Days      1/1/2025    11:39 PM   Dose in Tablet or Mg   TAB or MG? milligram (mg)     Pt Rptd Dose SUNDAY MONDAY TUESDAY WED THURS FRIDAY SATURDAY 1/1/2025  11:39 PM 12.5 12.5 12.5 15 12.5 12.5 15         1/1/2025   Warfarin Lab Questionnaire   Missed doses within past 14 days? No   Changes in diet or alcohol within past 14 days? No   Medication changes since last result? No   Injuries or illness since last result? No   New shortness of breath, severe headaches or sudden changes in vision since last result? No   Abnormal bleeding since last result? No   Upcoming surgery, procedure? No     Previous result: Therapeutic last 2(+) visits  Additional findings: None     PLAN     Unable to reach Tone by phone today.    Left message to take reduced dose of warfarin, 10 mg tonight. Request call back for assessment. MyChart Message sent also.    Follow up required to assess for changes , discuss out of range result , and discuss dosing instructions and confirm understanding of instructions.    Abby Chatman RN  1/2/2025  Anticoagulation Clinic  [facility] for routing messages: p ANT[facility]AG [facility]  [facility] patient phone line: [phone]

## 2025-01-02 NOTE — PROGRESS NOTES
ANTICOAGULATION MANAGEMENT     James called back and we discussed things.    Previous result: Therapeutic last 2(+) visits but on the low end  Additional findings: Zepbound dose increased about 2 Sundays ago/ Can alter warfarin absorption. Has had more bruising than usual. Did have some alcohol for New Years but feels today's result solely because of that.       PLAN     Recommended plan for ongoing change(s) affecting INR     Dosing Instructions: partial hold then decrease your warfarin dose (5.4% change) with next INR in up to 2 weeks       Summary  As of 1/2/2025      Full warfarin instructions:  1/2: 10 mg; Otherwise 12.5 mg every day   Next INR check:  1/15/2025               Telephone call with Tone who verbalizes understanding and agrees to plan  Sent [employer] message with dosing and follow up instructions    Lab visit scheduled    Education provided: Please call back if any changes to your diet, medications or how you've been taking warfarin  Goal range and lab monitoring: goal range and significance of current result  Symptom monitoring: monitoring for bleeding signs and symptoms  Written instructions provided  Contact [phone] with any changes, questions or concerns.     Plan made per [facility] anticoagulation protocol    Abby Chatman RN  1/2/2025  Anticoagulation Clinic  [employer] for routing messages: p ANT[facility]  [facility] patient phone line: [phone]        _______________________________________________________________________     Anticoagulation Episode Summary       Current INR goal:  CFX  40-20% (= INR 2.0-3.0)   TTR:  --   Target end date:  Indefinite   Send INR reminders to:  [facility]    Indications    Antiphospholipid antibody syndrome (H) [D68.61]  Chronic anticoagulation [Z79.01]  Antiphospholipid syndrome (H) [D68.61]  History of pulmonary embolism [Z86.711]  History of CVA (cerebrovascular accident) [Z86.73]  Embolism and thrombosis of artery (H) [I74.9]             Comments:  --              Anticoagulation Care Providers       Provider Role Specialty Phone number    Yara Faust MD Referring Family Medicine [phone]

## 2025-01-08 ENCOUNTER — VIRTUAL VISIT (OUTPATIENT)
Dept: ENDOCRINOLOGY | Facility: CLINIC | Age: 35
End: 2025-01-08
Payer: COMMERCIAL

## 2025-01-08 DIAGNOSIS — R79.89 LOW SERUM TESTOSTERONE: ICD-10-CM

## 2025-01-08 DIAGNOSIS — E22.1 HYPERPROLACTINEMIA: ICD-10-CM

## 2025-01-08 DIAGNOSIS — E03.9 HYPOTHYROIDISM, UNSPECIFIED TYPE: ICD-10-CM

## 2025-01-08 DIAGNOSIS — E66.811 CLASS 1 OBESITY WITH SERIOUS COMORBIDITY AND BODY MASS INDEX (BMI) OF 34.0 TO 34.9 IN ADULT, UNSPECIFIED OBESITY TYPE: Primary | ICD-10-CM

## 2025-01-08 NOTE — LETTER
1/8/2025      Tone Marley  [address]  [facility] [address]      Dear Colleague,    Thank you for referring your patient, Tone Marley, to the [facility]. Please see a copy of my visit note below.    Endocrinology Clinic Visit       Video-Visit Details    Type of service:  Video Visit    Joined the call at 1/8/2025, 8:37:19 am.  Left the call at 1/8/2025, 8:49:17 am.      Originating Location (pt. Location): Home        Distant Location (provider location):  Off-site    Mode of Communication:  Video Conference via [employer]    Physician has received verbal consent for a Video Visit from the patient? Yes       NAME:  Tone Marley  PCP:  Yara Faust  MRN:  [id number]  Reason for Consult: Hypogonadism  Requesting Provider:  No ref. provider found    Chief Complaint     Chief Complaint   Patient presents with     RECHECK     3 mo follow-up        History of Present Illness     Tone Marley is a 34 year old male who is seen in video visit for hypogonadism.    He was previously seen by Dr. Casey in October 2019 and with Dr. Contreras. He established with me 4/2024.    He has a hx of antiphospholipid syndrome and is on anticoagulation for this. This was diagnosed at age 13 after he had a DVT in the lower extremity and PE.  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  he had headache, vision and speech abnormalities, sx improved with therapy.     Prior to therapy with testosterone he was having low libido, low energy, and difficulty losing weight.  I reviewed all his testosterone levels. Low Testosterone Total 200 (240-950) on 8/20/19 at 9:28am, 164 on 8/7/18 at 11:15 am, 124 on 3/8/18 at 7:47 am. He was seen by Dr. Zuleta 10/2019, pituitary access were checked:      Latest Reference Range & Units 10/30/19 07:40  10/30/19 07:41   Adrenal Corticotropin <47 pg/mL  48 (H)   Cortisol Serum 4 - 22 ug/dL  20.0   Free Testosterone Calculated 4.7 - 24.4 ng/dL 7.88    FSH 0.7 - 10.8 IU/L 2.8    Growth Hormone <1.3 ug/L <0.1    Prolactin 2 - 18 ug/L 25 (H)    PSA 0 - 4 ug/L 0.59    T4 Free 0.76 - 1.46 ng/dL 1.03    Testosterone Total 240 - 950 ng/dL 259    TSH 0.40 - 4.00 mU/L 4.44 (H)         Brain Mri 1/2020: showed a normal pituitary gland.  Repeat MRI 10/2023: no evidence of pituitary adenoma.    He initial started on T injections end of 2019 then switched to androgel.     He feels good since being on testosterone. He had LUTS when he was started on T injections in 2019. He also does not like needles and that's why he prefers topical.    No other side effects since starting T.    Had low-normal testosterone on 2 pumps/day androgel but appears to be supratherapeutic on 4 ppd.  he had discussion with Dr. Contreras about labs on topical are not always as consistent but this was fairly elevated so we will plan to lower dose and recheck. Recheck T level 11/2023  was 505.    Never fathered any children. He is interested in fertility in the near future.  He had concerns about his last FSH and LH being suppressed on 5/2023.    Last T level 8/2024 total 367 and free 9.97. last hematocrit 8/2024 wnl.    Today he reported feeling good.        # elevated PRL  Elevated PRL level without galactorrhea or gynecomastia.  No adenoma seen on MRI in 2020 when level was around 25.  PRL trended up to 55 on 9/2023 and 70 on 1/2024. PRL 73 on 8/2024 no macroprolactin.    He started cabergoline 0.25 mg weekly on 8/2024.     Repeat PRL 27 on 9/2024.    Denied side effects on cabergoline.    PRL remains above normal at 19 on 1/2025. Cabergoline increased to 0.25 mg twice a week.    # obesity class 1  He continues to gain wt despite regular exercising, was 245 lbs 4/2024.      He started zepbound, currently at 7.5 mg weekly.   He reported nausea and reflux on  this dose. He lost a total of 15 lbs so far.    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     Problem List     Patient Active Problem List   Diagnosis     Antiphospholipid syndrome     Mixed hyperlipidemia     Benign essential hypertension     Attention deficit hyperactivity disorder (ADHD), predominantly inattentive type     History of pulmonary embolism     Chronic anticoagulation     Class 2 obesity due to excess calories without serious comorbidity with body mass index (BMI) of 37.0 to 37.9 in adult     Obesity (BMI 35.0-39.9) with comorbidity (H)     Antiphospholipid antibody syndrome     History of CVA (cerebrovascular accident)     Low serum testosterone     Rosacea     Hyperprolactinemia     Embolism and thrombosis of artery (H)        Medications     Current Outpatient Medications   Medication Sig Dispense Refill     ASPIRIN PO Take 81 mg by mouth daily       atorvastatin (LIPITOR) 10 MG tablet TAKE 1 TABLET(10 MG) BY MOUTH DAILY 90 tablet 2     cabergoline (DOSTINEX) 0.5 MG tablet Take 1 tablet (0.5 mg) by mouth twice a week. 6 tablet 3     hydrochlorothiazide (HYDRODIURIL) 25 MG tablet Take 1 tablet (25 mg) by mouth daily 90 tablet 3     losartan (COZAAR) 100 MG tablet Take 1 tablet (100 mg) by mouth daily 90 tablet 3     Omega-3 Fatty Acids (FISH OIL PO)        testosterone (ANDROGEL 1.62 % PUMP) 20.25 MG/ACT gel Place 3 Pump (60.75 mg) onto the skin daily. Apply from dispenser to clean, dry, intact skin of the upper arms and shoulders. 375 g 1     tirzepatide-Weight Management (ZEPBOUND) 2.5 MG/0.5ML prefilled pen Inject 0.5 mLs (2.5 mg) subcutaneously every 7 days. 2 mL 1     tirzepatide-Weight Management (ZEPBOUND) 5 MG/0.5ML prefilled pen Inject 0.5 mLs (5 mg) subcutaneously every 7 days. 2 mL 0     tirzepatide-Weight Management (ZEPBOUND) 7.5 MG/0.5ML prefilled pen Inject 0.5 mLs (7.5 mg) subcutaneously every 7 days. 2 mL 1     warfarin ANTICOAGULANT (COUMADIN) 10 MG tablet Take  one tab daily. Or as directed by INR clinic. 90 tablet 1     warfarin ANTICOAGULANT (COUMADIN) 5 MG tablet Take 1/2 tablet by mouth daily along with 10 mg tablet for a total of 12.5 mg daily. Adjust dose based on INR results as directed. 90 tablet 3     No current facility-administered medications for this visit.        Allergies     Allergies   Allergen Reactions     Nkda [No Known Drug Allergy]        Medical / Surgical History     Past Medical History:   Diagnosis Date     ADHD (attention deficit hyperactivity disorder) 6/27/2012     Antiphospholipid antibody syndromes     at age 13     Antiphospholipid syndrome 2/2/2012    On anticoagulation coumdin 10 mg once daily       Cerebral infarction (H) 7/1/18    Vertebral artery dissection     History of pulmonary embolism 9/2/2013    Was diagnosed  Positive CT scan in emergency department   now Under care of Dr Turcios at [facility],[phone] His nurse personal line [phone] Was on lovenox for 5 months  Now  For past one month on xareltlo 20 mg once daily and plaqunil twice daily       Hyperlipidemia LDL goal <130 6/11/2013     Hypertension goal BP (blood pressure) < 140/90 6/19/12     Hypertension, uncontrolled 6/27/2012     Postphlebitic syndrome 12/9/2014     Prehypertension 2/2/2012     Pulmonary embolism (H)     at age 13     Pulmonary embolism (H) 1/11/2015     Past Surgical History:   Procedure Laterality Date     wisdom teeth extraction         Social History     Social History     Socioeconomic History     Marital status: Single     Spouse name: Not on file     Number of children: Not on file     Years of education: Not on file     Highest education level: Not on file   Occupational History     Employer: [employer]   Tobacco Use     Smoking status: Never     Smokeless tobacco: Former     Types: Chew     Quit date: 07/2018     Tobacco comments:     Chew maybe once per week.   Vaping Use     Vaping status: Never Used   Substance and Sexual Activity      Alcohol use: Yes     Comment: occasional     Drug use: No     Sexual activity: Yes     Partners: Female     Birth control/protection: I.U.D.   Other Topics Concern     Parent/sibling w/ CABG, MI or angioplasty before 65F 55M? 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 Paternal Uncle 42     Other Cancer Maternal Grandmother         Lung cancer (non-smoker)       ROS     12 ROS completed, pertinent positive and negative in HPI    Physical Exam   There were no vitals taken for this visit.   GENERAL: alert and no distress  EYES: Eyes grossly normal to inspection.  No discharge or erythema, or obvious scleral/conjunctival abnormalities.  RESP: No audible wheeze, cough, or visible cyanosis.    SKIN: Visible skin clear. No significant rash, abnormal pigmentation or lesions.  NEURO: Cranial nerves grossly intact.  Mentation and speech appropriate for age.  PSYCH: Appropriate affect, tone, and pace of words     Labs/Imaging     Pertinent Labs were reviewed and updated in EPIC and discussed briefly.  Radiology Results were  reviewed and updated in EPIC and discussed briefly.    Summary of recent findings:   Lab  "Results   Component Value Date    A1C 4.9 05/31/2022       TSH   Date Value Ref Range Status   08/14/2024 4.56 (H) 0.30 - 4.20 uIU/mL Final   01/10/2024 1.98 0.30 - 4.20 uIU/mL Final   11/08/2023 5.08 (H) 0.30 - 4.20 uIU/mL Final   12/27/2022 4.34 (H) 0.30 - 4.20 uIU/mL Final   10/30/2019 4.44 (H) 0.40 - 4.00 mU/L Final   02/21/2018 3.41 0.40 - 4.00 mU/L Final   08/18/2016 1.86 0.40 - 4.00 mU/L Final     T4 Free   Date Value Ref Range Status   10/30/2019 1.03 0.76 - 1.46 ng/dL Final     Free T4   Date Value Ref Range Status   08/14/2024 1.27 0.90 - 1.70 ng/dL Final   11/08/2023 1.30 0.90 - 1.70 ng/dL Final   12/27/2022 1.20 0.90 - 1.70 ng/dL Final       Creatinine   Date Value Ref Range Status   05/15/2024 1.02 0.67 - 1.17 mg/dL Final   09/22/2020 1.11 0.66 - 1.25 mg/dL Final       Recent Labs   Lab Test 04/26/23  0858 01/05/22  0824   CHOL 136 155   HDL 44 46   LDL 73 81   TRIG 93 138       No results found for: \"[id number]\", \"[id number]\", \"[id number]\"    I personally reviewed the patient's outside records from [facility] and Care Everywhere. Summary of pertinent findings in HPI.    Impression / Plan       1) hypogonadotropic Hypogonadism  2) hyperprolactinemia  3) interested in fertility  Initial testing with low normal T level and inappropriately normal gonadotropins, thought to be related to his CVA.  He did have slightly elevated prolactin which has been trending up with known normal pituitary on imaging.He has been on testosterone replacement since 2019, he is interested in future fertility.      His prolactin level has been trending up, although there is no lesion on the pituitary on most recent brain MRI this still could be a microadenoma not seen on imaging. Started on cabergoline low dose on 8/2024. Responded well PRL on recheck 9/2024. We discussed treatment for 2 years in order to increase chance of remission.     Plan:  Increase cabergoline to 0.25 mg twice a week and check PRL in 1month   Testosterone " levels, PSA and hematocrit in 6 month     4) subclinical hypothyroidism  Very mild subclinical hypothyroidism.  He reported he did not tolerate levothyroxine and prefers to stay off of it which is reasonable given his TSH mildly elevated and less than 10.  Continue to monitor.    5) obesity class 1 , BMI 34  6) hx of CVA  He failed lifestyle changes for wt. Started on zepbound, responding well. Has nausea as side effect on 7.5 mg weekly and would like to stay on same dose for now.     Test and/or medications prescribed today:  No orders of the defined types were placed in this encounter.        Follow up: 6 month    The longitudinal plan of care for the diagnosis(es)/condition(s) as documented were addressed during this visit. Due to the added complexity in care, I will continue to support Tone in the subsequent management and with ongoing continuity of care.    Toshia Roche MD  Endocrinology, Diabetes and Metabolism  [facility]      Again, thank you for allowing me to participate in the care of your patient.        Sincerely,        Toshia Roche MD    Electronically signed

## 2025-01-08 NOTE — NURSING NOTE
Current patient location:  [facility]     Is the patient currently in the state of [place]? YES    Visit mode:VIDEO    If the visit is dropped, the patient can be reconnected by:VIDEO VISIT: Text to cell phone:   Telephone Information:   Mobile [phone]       Will anyone else be joining the visit? NO  (If patient encounters technical issues they should call [phone])    Are changes needed to the allergy or medication list? Pt stated no med changes- unable to individually review to due to time- pt was needing to hurry check in as we was just walking in to get labs done    Are refills needed on medications prescribed by this physician? NO    Rooming Documentation:  Not applicable    Reason for visit: RECHECK (3 mo follow-up )    Carol DOAN

## 2025-01-08 NOTE — PROGRESS NOTES
Endocrinology Clinic Visit       Video-Visit Details    Type of service:  Video Visit    Joined the call at 1/8/2025, 8:37:19 am.  Left the call at 1/8/2025, 8:49:17 am.      Originating Location (pt. Location): Home        Distant Location (provider location):  Off-site    Mode of Communication:  Video Conference via [employer]click    Physician has received verbal consent for a Video Visit from the patient? Yes       NAME:  Tone Marley  PCP:  Yara Faust  MRN:  [id number]  Reason for Consult: Hypogonadism  Requesting Provider:  No ref. provider found    Chief Complaint     Chief Complaint   Patient presents with    RECHECK     3 mo follow-up        History of Present Illness     Tone Marley is a 34 year old male who is seen in video visit for hypogonadism.    He was previously seen by Dr. Casey in October 2019 and with Dr. Contreras. He established with me 4/2024.    He has a hx of antiphospholipid syndrome and is on anticoagulation for this. This was diagnosed at age 13 after he had a DVT in the lower extremity and PE.  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  he had headache, vision and speech abnormalities, sx improved with therapy.     Prior to therapy with testosterone he was having low libido, low energy, and difficulty losing weight.  I reviewed all his testosterone levels. Low Testosterone Total 200 (240-950) on 8/20/19 at 9:28am, 164 on 8/7/18 at 11:15 am, 124 on 3/8/18 at 7:47 am. He was seen by Dr. Zuleta 10/2019, pituitary access were checked:      Latest Reference Range & Units 10/30/19 07:40 10/30/19 07:41   Adrenal Corticotropin <47 pg/mL  48 (H)   Cortisol Serum 4 - 22 ug/dL  20.0   Free Testosterone Calculated 4.7 - 24.4 ng/dL 7.88    FSH 0.7 - 10.8 IU/L 2.8    Growth Hormone <1.3 ug/L <0.1    Prolactin 2 - 18 ug/L 25 (H)    PSA 0 - 4  ug/L 0.59    T4 Free 0.76 - 1.46 ng/dL 1.03    Testosterone Total 240 - 950 ng/dL 259    TSH 0.40 - 4.00 mU/L 4.44 (H)         Brain Mri 1/2020: showed a normal pituitary gland.  Repeat MRI 10/2023: no evidence of pituitary adenoma.    He initial started on T injections end of 2019 then switched to androgel.     He feels good since being on testosterone. He had LUTS when he was started on T injections in 2019. He also does not like needles and that's why he prefers topical.    No other side effects since starting T.    Had low-normal testosterone on 2 pumps/day androgel but appears to be supratherapeutic on 4 ppd.  he had discussion with Dr. Contreras about labs on topical are not always as consistent but this was fairly elevated so we will plan to lower dose and recheck. Recheck T level 11/2023  was 505.    Never fathered any children. He is interested in fertility in the near future.  He had concerns about his last FSH and LH being suppressed on 5/2023.    Last T level 8/2024 total 367 and free 9.97. last hematocrit 8/2024 wnl.    Today he reported feeling good.        # elevated PRL  Elevated PRL level without galactorrhea or gynecomastia.  No adenoma seen on MRI in 2020 when level was around 25.  PRL trended up to 55 on 9/2023 and 70 on 1/2024. PRL 73 on 8/2024 no macroprolactin.    He started cabergoline 0.25 mg weekly on 8/2024.     Repeat PRL 27 on 9/2024.    Denied side effects on cabergoline.    PRL remains above normal at 19 on 1/2025. Cabergoline increased to 0.25 mg twice a week.    # obesity class 1  He continues to gain wt despite regular exercising, was 245 lbs 4/2024.      He started zepbound, currently at 7.5 mg weekly.   He reported nausea and reflux on this dose. He lost a total of 15 lbs so far.    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     Problem List     Patient Active Problem List   Diagnosis    Antiphospholipid syndrome    Mixed hyperlipidemia    Benign essential  hypertension    Attention deficit hyperactivity disorder (ADHD), predominantly inattentive type    History of pulmonary embolism    Chronic anticoagulation    Class 2 obesity due to excess calories without serious comorbidity with body mass index (BMI) of 37.0 to 37.9 in adult    Obesity (BMI 35.0-39.9) with comorbidity (H)    Antiphospholipid antibody syndrome    History of CVA (cerebrovascular accident)    Low serum testosterone    Rosacea    Hyperprolactinemia    Embolism and thrombosis of artery (H)        Medications     Current Outpatient Medications   Medication Sig Dispense Refill    ASPIRIN PO Take 81 mg by mouth daily      atorvastatin (LIPITOR) 10 MG tablet TAKE 1 TABLET(10 MG) BY MOUTH DAILY 90 tablet 2    cabergoline (DOSTINEX) 0.5 MG tablet Take 1 tablet (0.5 mg) by mouth twice a week. 6 tablet 3    hydrochlorothiazide (HYDRODIURIL) 25 MG tablet Take 1 tablet (25 mg) by mouth daily 90 tablet 3    losartan (COZAAR) 100 MG tablet Take 1 tablet (100 mg) by mouth daily 90 tablet 3    Omega-3 Fatty Acids (FISH OIL PO)       testosterone (ANDROGEL 1.62 % PUMP) 20.25 MG/ACT gel Place 3 Pump (60.75 mg) onto the skin daily. Apply from dispenser to clean, dry, intact skin of the upper arms and shoulders. 375 g 1    tirzepatide-Weight Management (ZEPBOUND) 2.5 MG/0.5ML prefilled pen Inject 0.5 mLs (2.5 mg) subcutaneously every 7 days. 2 mL 1    tirzepatide-Weight Management (ZEPBOUND) 5 MG/0.5ML prefilled pen Inject 0.5 mLs (5 mg) subcutaneously every 7 days. 2 mL 0    tirzepatide-Weight Management (ZEPBOUND) 7.5 MG/0.5ML prefilled pen Inject 0.5 mLs (7.5 mg) subcutaneously every 7 days. 2 mL 1    warfarin ANTICOAGULANT (COUMADIN) 10 MG tablet Take one tab daily. Or as directed by INR clinic. 90 tablet 1    warfarin ANTICOAGULANT (COUMADIN) 5 MG tablet Take 1/2 tablet by mouth daily along with 10 mg tablet for a total of 12.5 mg daily. Adjust dose based on INR results as directed. 90 tablet 3     No current  facility-administered medications for this visit.        Allergies     Allergies   Allergen Reactions    Nkda [No Known Drug Allergy]        Medical / Surgical History     Past Medical History:   Diagnosis Date    ADHD (attention deficit hyperactivity disorder) 6/27/2012    Antiphospholipid antibody syndromes     at age 13    Antiphospholipid syndrome 2/2/2012    On anticoagulation coumdin 10 mg once daily      Cerebral infarction (H) 7/1/18    Vertebral artery dissection    History of pulmonary embolism 9/2/2013    Was diagnosed  Positive CT scan in emergency department   now Under care of Dr Turcios at [facility],[phone] His nurse personal line [phone] Was on lovenox for 5 months  Now  For past one month on xareltlo 20 mg once daily and plaqunil twice daily      Hyperlipidemia LDL goal <130 6/11/2013    Hypertension goal BP (blood pressure) < 140/90 6/19/12    Hypertension, uncontrolled 6/27/2012    Postphlebitic syndrome 12/9/2014    Prehypertension 2/2/2012    Pulmonary embolism (H)     at age 13    Pulmonary embolism (H) 1/11/2015     Past Surgical History:   Procedure Laterality Date    wisdom teeth extraction         Social History     Social History     Socioeconomic History    Marital status: Single     Spouse name: Not on file    Number of children: Not on file    Years of education: Not on file    Highest education level: Not on file   Occupational History     Employer: [employer]   Tobacco Use    Smoking status: Never    Smokeless tobacco: Former     Types: Chew     Quit date: 07/2018    Tobacco comments:     Chew maybe once per week.   Vaping Use    Vaping status: Never Used   Substance and Sexual Activity    Alcohol use: Yes     Comment: occasional    Drug use: No    Sexual activity: Yes     Partners: Female     Birth control/protection: I.U.D.   Other Topics Concern    Parent/sibling w/ CABG, MI or angioplasty before 65F 55M? 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 Paternal Uncle 42    Other Cancer Maternal Grandmother         Lung cancer (non-smoker)       ROS     12 ROS completed, pertinent positive and negative in HPI    Physical Exam   There were no vitals taken for this visit.   GENERAL: alert and no distress  EYES: Eyes grossly normal to inspection.  No discharge or erythema, or obvious scleral/conjunctival abnormalities.  RESP: No audible wheeze, cough, or visible cyanosis.    SKIN: Visible skin clear. No significant rash, abnormal pigmentation or lesions.  NEURO: Cranial nerves grossly intact.  Mentation and speech appropriate for age.  PSYCH: Appropriate affect, tone, and pace of words     Labs/Imaging     Pertinent Labs were reviewed and updated in EPIC and discussed briefly.  Radiology Results were  reviewed and updated in EPIC and discussed briefly.    Summary of recent findings:   Lab Results   Component Value Date    A1C 4.9 05/31/2022       TSH   Date Value Ref Range Status   08/14/2024 4.56 (H) 0.30 - 4.20 uIU/mL Final   01/10/2024 1.98 0.30 - 4.20 uIU/mL Final   11/08/2023 5.08 (H) 0.30 - 4.20 uIU/mL Final   12/27/2022 4.34 (H) 0.30 - 4.20 uIU/mL Final   10/30/2019 4.44 (H) 0.40 - 4.00 mU/L  "Final   02/21/2018 3.41 0.40 - 4.00 mU/L Final   08/18/2016 1.86 0.40 - 4.00 mU/L Final     T4 Free   Date Value Ref Range Status   10/30/2019 1.03 0.76 - 1.46 ng/dL Final     Free T4   Date Value Ref Range Status   08/14/2024 1.27 0.90 - 1.70 ng/dL Final   11/08/2023 1.30 0.90 - 1.70 ng/dL Final   12/27/2022 1.20 0.90 - 1.70 ng/dL Final       Creatinine   Date Value Ref Range Status   05/15/2024 1.02 0.67 - 1.17 mg/dL Final   09/22/2020 1.11 0.66 - 1.25 mg/dL Final       Recent Labs   Lab Test 04/26/23  0858 01/05/22  0824   CHOL 136 155   HDL 44 46   LDL 73 81   TRIG 93 138       No results found for: \"[id number]\", \"[id number]\", \"[id number]\"    I personally reviewed the patient's outside records from [employer] EMR and Care Everywhere. Summary of pertinent findings in HPI.    Impression / Plan       1) hypogonadotropic Hypogonadism  2) hyperprolactinemia  3) interested in fertility  Initial testing with low normal T level and inappropriately normal gonadotropins, thought to be related to his CVA.  He did have slightly elevated prolactin which has been trending up with known normal pituitary on imaging.He has been on testosterone replacement since 2019, he is interested in future fertility.      His prolactin level has been trending up, although there is no lesion on the pituitary on most recent brain MRI this still could be a microadenoma not seen on imaging. Started on cabergoline low dose on 8/2024. Responded well PRL on recheck 9/2024. We discussed treatment for 2 years in order to increase chance of remission.     Plan:  Increase cabergoline to 0.25 mg twice a week and check PRL in 1month   Testosterone levels, PSA and hematocrit in 6 month     4) subclinical hypothyroidism  Very mild subclinical hypothyroidism.  He reported he did not tolerate levothyroxine and prefers to stay off of it which is reasonable given his TSH mildly elevated and less than 10.  Continue to monitor.    5) obesity class 1 , BMI 34  6) hx " of CVA  He failed lifestyle changes for wt. Started on zepbound, responding well. Has nausea as side effect on 7.5 mg weekly and would like to stay on same dose for now.     Test and/or medications prescribed today:  No orders of the defined types were placed in this encounter.        Follow up: 6 month    The longitudinal plan of care for the diagnosis(es)/condition(s) as documented were addressed during this visit. Due to the added complexity in care, I will continue to support Tone in the subsequent management and with ongoing continuity of care.    Toshia Roche MD  Endocrinology, Diabetes and Metabolism  [facility]

## 2025-01-15 ENCOUNTER — ANTICOAGULATION THERAPY VISIT (OUTPATIENT)
Dept: ANTICOAGULATION | Facility: CLINIC | Age: 35
End: 2025-01-15

## 2025-01-15 ENCOUNTER — LAB (OUTPATIENT)
Dept: LAB | Facility: CLINIC | Age: 35
End: 2025-01-15
Payer: COMMERCIAL

## 2025-01-15 DIAGNOSIS — Z86.73 HISTORY OF CVA (CEREBROVASCULAR ACCIDENT): ICD-10-CM

## 2025-01-15 DIAGNOSIS — I74.9 EMBOLISM AND THROMBOSIS OF ARTERY (H): ICD-10-CM

## 2025-01-15 DIAGNOSIS — Z79.01 CHRONIC ANTICOAGULATION: ICD-10-CM

## 2025-01-15 DIAGNOSIS — D68.61 ANTIPHOSPHOLIPID ANTIBODY SYNDROME: Primary | ICD-10-CM

## 2025-01-15 DIAGNOSIS — D68.61 ANTIPHOSPHOLIPID SYNDROME: ICD-10-CM

## 2025-01-15 DIAGNOSIS — Z86.711 HISTORY OF PULMONARY EMBOLISM: ICD-10-CM

## 2025-01-15 LAB — FACT X ACT/NOR PPP CHRO: 19 % (ref 70–130)

## 2025-01-15 PROCEDURE — 85260 CLOT FACTOR X STUART-POWER: CPT

## 2025-01-15 PROCEDURE — 36415 COLL VENOUS BLD VENIPUNCTURE: CPT

## 2025-01-15 NOTE — PROGRESS NOTES
ANTICOAGULATION MANAGEMENT     Tone Marley 34 year old male is on warfarin with supratherapeutic result. (Goal Chromogenic Factor X 40-20%)    Recent labs: (last 7 days)     01/15/25  0832   [id number] 19*       ASSESSMENT     Warfarin Lab Questionnaire    Warfarin Doses Last 7 Days      1/14/2025    10:38 AM   Dose in Tablet or Mg   TAB or MG? milligram (mg)     Pt Rptd Dose SUNDAY MONDAY TUESDAY WED THURS FRIDAY SATURDAY 1/14/2025  10:38 AM 12.5 12.5 12.5 12.5 12.5 12.5 12.5         1/14/2025   Warfarin Lab Questionnaire   Missed doses within past 14 days? No   Changes in diet or alcohol within past 14 days? No   Medication changes since last result? No   Injuries or illness since last result? No   New shortness of breath, severe headaches or sudden changes in vision since last result? No   Abnormal bleeding since last result? No   Upcoming surgery, procedure? No     Previous result: Supratherapeutic - 5.4% reduction  Additional findings: Not having the bruising like he was a few weeks ago. No alcohol use. Was hoping CFX would be in range. Agreed that decreasing dose a little bit more will probably be best due to Zepbound dose increasing a few weeks ago and possibly affecting warfarin effect.     PLAN     Recommended plan for ongoing change(s) affecting result    Dosing Instructions:  decrease your warfarin dose to 10 mg every Wednesday and 12.5 mg all other days (2.9% change) with next Chromogenic Factor X in 2 weeks       Telephone call with Tone who verbalizes understanding and agrees to plan  Sent [employer]gu[employer] message with dosing and follow up instructions    Lab visit scheduled    Education provided:   Please call back if any changes to your diet, medications or how you've been taking warfarin  Goal range and lab monitoring: goal range and significance of current result  Symptom monitoring: monitoring for bleeding signs and symptoms  Written instructions provided  Contact [phone] with any  changes, questions or concerns.     Plan made per [facility] anticoagulation protocol    Abby Chatman RN  1/15/2025  Anticoagulation Clinic  [facility] pool for routing messages: waldo [place]  [facility] patient phone line: [phone]

## 2025-01-29 ENCOUNTER — ANTICOAGULATION THERAPY VISIT (OUTPATIENT)
Dept: ANTICOAGULATION | Facility: CLINIC | Age: 35
End: 2025-01-29

## 2025-01-29 ENCOUNTER — LAB (OUTPATIENT)
Dept: LAB | Facility: CLINIC | Age: 35
End: 2025-01-29
Payer: COMMERCIAL

## 2025-01-29 DIAGNOSIS — Z86.711 HISTORY OF PULMONARY EMBOLISM: ICD-10-CM

## 2025-01-29 DIAGNOSIS — Z86.73 HISTORY OF CVA (CEREBROVASCULAR ACCIDENT): ICD-10-CM

## 2025-01-29 DIAGNOSIS — Z79.01 CHRONIC ANTICOAGULATION: ICD-10-CM

## 2025-01-29 DIAGNOSIS — D68.61 ANTIPHOSPHOLIPID ANTIBODY SYNDROME: Primary | ICD-10-CM

## 2025-01-29 DIAGNOSIS — D68.61 ANTIPHOSPHOLIPID SYNDROME: ICD-10-CM

## 2025-01-29 DIAGNOSIS — I74.9 EMBOLISM AND THROMBOSIS OF ARTERY (H): ICD-10-CM

## 2025-01-29 LAB — FACT X ACT/NOR PPP CHRO: 21 % (ref 70–130)

## 2025-01-29 PROCEDURE — 85260 CLOT FACTOR X STUART-POWER: CPT

## 2025-01-29 PROCEDURE — 36415 COLL VENOUS BLD VENIPUNCTURE: CPT

## 2025-01-29 NOTE — PROGRESS NOTES
ANTICOAGULATION MANAGEMENT     Tone Marley 34 year old male is on warfarin with therapeutic result. (Goal Chromogenic Factor X 40-20%)    Recent labs: (last 7 days)     01/29/25  0849   [id number] 21*       ASSESSMENT     Warfarin Lab Questionnaire    Warfarin Doses Last 7 Days      1/28/2025     9:18 AM   Dose in Tablet or Mg   TAB or MG? milligram (mg)     Pt Rptd Dose SUNDAY MONDAY TUESDAY WED THURS FRIDAY SATURDAY 1/28/2025   9:18 AM 12.5 12.5 12.5 10 12.5 12.5 12.5         1/28/2025   Warfarin Lab Questionnaire   Missed doses within past 14 days? No   Changes in diet or alcohol within past 14 days? No   Medication changes since last result? No   Injuries or illness since last result? No   New shortness of breath, severe headaches or sudden changes in vision since last result? No   Abnormal bleeding since last result? No   Upcoming surgery, procedure? No     Previous result: Supratherapeutic  Additional findings: None     PLAN     Recommended plan for no diet, medication or health factor changes affecting result    Dosing Instructions: Continue your current warfarin dose 10 mg W, 12.5 mg AOD  with next Chromogenic Factor X in 3 weeks       Telephone call with Tone who verbalizes understanding and agrees to plan    Lab visit scheduled    Education provided:   Contact [phone] with any changes, questions or concerns.     Plan made per [facility] anticoagulation protocol    Maria Teresa Sierra RN  1/29/2025  Anticoagulation Clinic  [employer] for routing messages: p ANT[facility]AG [place]  [facility] patient phone line: [phone]

## 2025-02-11 ENCOUNTER — TELEPHONE (OUTPATIENT)
Dept: ENDOCRINOLOGY | Facility: CLINIC | Age: 35
End: 2025-02-11
Payer: COMMERCIAL

## 2025-02-26 ENCOUNTER — LAB (OUTPATIENT)
Dept: LAB | Facility: CLINIC | Age: 35
End: 2025-02-26
Payer: COMMERCIAL

## 2025-02-26 DIAGNOSIS — Z79.01 CHRONIC ANTICOAGULATION: ICD-10-CM

## 2025-02-26 DIAGNOSIS — I74.9 EMBOLISM AND THROMBOSIS OF ARTERY (H): ICD-10-CM

## 2025-02-26 DIAGNOSIS — Z86.73 HISTORY OF CVA (CEREBROVASCULAR ACCIDENT): ICD-10-CM

## 2025-02-26 PROCEDURE — 85260 CLOT FACTOR X STUART-POWER: CPT

## 2025-02-26 PROCEDURE — 36415 COLL VENOUS BLD VENIPUNCTURE: CPT

## 2025-02-27 ENCOUNTER — ANTICOAGULATION THERAPY VISIT (OUTPATIENT)
Dept: ANTICOAGULATION | Facility: CLINIC | Age: 35
End: 2025-02-27
Payer: COMMERCIAL

## 2025-02-27 DIAGNOSIS — D68.61 ANTIPHOSPHOLIPID ANTIBODY SYNDROME: Primary | ICD-10-CM

## 2025-02-27 DIAGNOSIS — Z86.711 HISTORY OF PULMONARY EMBOLISM: ICD-10-CM

## 2025-02-27 DIAGNOSIS — I74.9 EMBOLISM AND THROMBOSIS OF ARTERY (H): ICD-10-CM

## 2025-02-27 DIAGNOSIS — Z86.73 HISTORY OF CVA (CEREBROVASCULAR ACCIDENT): ICD-10-CM

## 2025-02-27 DIAGNOSIS — Z79.01 CHRONIC ANTICOAGULATION: ICD-10-CM

## 2025-02-27 DIAGNOSIS — D68.61 ANTIPHOSPHOLIPID SYNDROME: ICD-10-CM

## 2025-02-27 LAB — FACT X ACT/NOR PPP CHRO: 24 % (ref 70–130)

## 2025-02-27 RX ORDER — WARFARIN SODIUM 10 MG/1
TABLET ORAL
Qty: 90 TABLET | Refills: 1 | Status: SHIPPED | OUTPATIENT
Start: 2025-02-27

## 2025-02-27 RX ORDER — WARFARIN SODIUM 5 MG/1
TABLET ORAL
Qty: 90 TABLET | Refills: 1 | Status: SHIPPED | OUTPATIENT
Start: 2025-02-27

## 2025-02-27 NOTE — PROGRESS NOTES
ANTICOAGULATION MANAGEMENT     Tone Marley 34 year old male is on warfarin with therapeutic result. (Goal Chromogenic Factor X 40-20%)    Recent labs: (last 7 days)     02/26/25  1256   [id number] 24*         ASSESSMENT     Warfarin Lab Questionnaire    Warfarin Doses Last 7 Days      2/26/2025    10:16 AM   Dose in Tablet or Mg   TAB or MG? milligram (mg)     Pt Rptd Dose SUNDAY MONDAY TUESDAY WED THURS FRIDAY SATURDAY 2/26/2025  10:16 AM 12.5 12.5 12.5 10 12.5 12.5 12.5         2/26/2025   Warfarin Lab Questionnaire   Missed doses within past 14 days? No   Changes in diet or alcohol within past 14 days? No   Medication changes since last result? No   Injuries or illness since last result? No   New shortness of breath, severe headaches or sudden changes in vision since last result? No   Abnormal bleeding since last result? No   Upcoming surgery, procedure? No     Previous result: Therapeutic last visit; previously outside of goal range  Additional findings: 4 weeks since last CFX.    Needs refill of both 5 mg and 10 mg tabs today. 90 day and 1 refill approved. Due for annual visit after 4/23/25.     PLAN     Recommended plan for no diet, medication or health factor changes affecting result    Dosing Instructions: Continue your current warfarin dose 10 mg every Wednesday and 12.5 mg all other days,  with next Chromogenic Factor X in 5 weeks       Telephone call with Tone who verbalizes understanding and agrees to plan  Sent [employer] message with dosing and follow up instructions    Lab visit scheduled    Education provided:   Goal range and lab monitoring: goal range and significance of current result  Written instructions provided  Contact [phone] with any changes, questions or concerns.     Plan made per [facility] anticoagulation protocol    Abby Chatman, RN  2/27/2025  Anticoagulation Clinic  [employer] for routing messages: p ANTICOAG [place]  [facility] patient phone line: [phone]

## 2025-03-10 ENCOUNTER — PATIENT OUTREACH (OUTPATIENT)
Dept: CARE COORDINATION | Facility: CLINIC | Age: 35
End: 2025-03-10
Payer: COMMERCIAL

## 2025-04-02 ENCOUNTER — ANTICOAGULATION THERAPY VISIT (OUTPATIENT)
Dept: ANTICOAGULATION | Facility: CLINIC | Age: 35
End: 2025-04-02

## 2025-04-02 ENCOUNTER — LAB (OUTPATIENT)
Dept: LAB | Facility: CLINIC | Age: 35
End: 2025-04-02
Payer: COMMERCIAL

## 2025-04-02 DIAGNOSIS — Z79.01 CHRONIC ANTICOAGULATION: ICD-10-CM

## 2025-04-02 DIAGNOSIS — E66.811 CLASS 1 OBESITY WITH SERIOUS COMORBIDITY AND BODY MASS INDEX (BMI) OF 34.0 TO 34.9 IN ADULT, UNSPECIFIED OBESITY TYPE: ICD-10-CM

## 2025-04-02 DIAGNOSIS — D68.61 ANTIPHOSPHOLIPID SYNDROME: ICD-10-CM

## 2025-04-02 DIAGNOSIS — Z86.73 HISTORY OF CVA (CEREBROVASCULAR ACCIDENT): ICD-10-CM

## 2025-04-02 DIAGNOSIS — E22.1 HYPERPROLACTINEMIA: ICD-10-CM

## 2025-04-02 DIAGNOSIS — I74.9 EMBOLISM AND THROMBOSIS OF ARTERY (H): ICD-10-CM

## 2025-04-02 DIAGNOSIS — Z86.711 HISTORY OF PULMONARY EMBOLISM: ICD-10-CM

## 2025-04-02 DIAGNOSIS — D68.61 ANTIPHOSPHOLIPID ANTIBODY SYNDROME: Primary | ICD-10-CM

## 2025-04-02 LAB
FACT X ACT/NOR PPP CHRO: 27 % (ref 70–130)
HCT VFR BLD AUTO: 43.6 % (ref 40–53)
HGB BLD-MCNC: 15.2 G/DL (ref 13.3–17.7)
PROLACTIN SERPL 3RD IS-MCNC: 19 NG/ML (ref 4–15)
SHBG SERPL-SCNC: 21 NMOL/L (ref 11–80)

## 2025-04-02 PROCEDURE — 85018 HEMOGLOBIN: CPT

## 2025-04-02 PROCEDURE — 84403 ASSAY OF TOTAL TESTOSTERONE: CPT

## 2025-04-02 PROCEDURE — 36415 COLL VENOUS BLD VENIPUNCTURE: CPT

## 2025-04-02 PROCEDURE — 84146 ASSAY OF PROLACTIN: CPT

## 2025-04-02 PROCEDURE — 85014 HEMATOCRIT: CPT

## 2025-04-02 PROCEDURE — 85260 CLOT FACTOR X STUART-POWER: CPT

## 2025-04-02 PROCEDURE — 84270 ASSAY OF SEX HORMONE GLOBUL: CPT

## 2025-04-02 NOTE — PROGRESS NOTES
ANTICOAGULATION MANAGEMENT     Tone Marley 35 year old male is on warfarin with therapeutic result. (Goal Chromogenic Factor X 40-20%)    Recent labs: (last 7 days)     04/02/25  0743   [id number] 27*       ASSESSMENT     Warfarin Lab Questionnaire    Warfarin Doses Last 7 Days      4/1/2025     8:39 PM   Dose in Tablet or Mg   TAB or MG? milligram (mg)     Pt Rptd Dose SUNDAY MONDAY TUESDAY WED THURS FRIDAY SATURDAY 4/1/2025   8:39 PM 12.5 12.5 12.5 10 12.5 12.5 12.5         4/1/2025   Warfarin Lab Questionnaire   Missed doses within past 14 days? No   Changes in diet or alcohol within past 14 days? No   Medication changes since last result? No   Injuries or illness since last result? No   New shortness of breath, severe headaches or sudden changes in vision since last result? No   Abnormal bleeding since last result? No   Upcoming surgery, procedure? No     Previous result: Therapeutic last 2(+) visits  Additional findings: None     PLAN     Recommended plan for no diet, medication or health factor changes affecting result    Dosing Instructions: Continue your current warfarin dose 10mg Wednesday and 12.5mg all other days  with next Chromogenic Factor X in 6 weeks       Telephone call with Tone who verbalizes understanding and agrees to plan    Lab visit scheduled    Education provided:   Goal range and lab monitoring: goal range and significance of current result  Contact [phone] with any changes, questions or concerns.     Plan made per [facility] anticoagulation protocol    Jaja Oliveira, RN  4/2/2025  Anticoagulation Clinic  [employer] for routing messages: p ANT[facility] [place]  [facility] patient phone line: [phone]

## 2025-04-03 LAB
TESTOST FREE SERPL-MCNC: 13.46 NG/DL
TESTOST SERPL-MCNC: 508 NG/DL (ref 240–950)

## 2025-04-07 ENCOUNTER — MYC REFILL (OUTPATIENT)
Dept: ENDOCRINOLOGY | Facility: CLINIC | Age: 35
End: 2025-04-07
Payer: COMMERCIAL

## 2025-04-07 DIAGNOSIS — E66.811 CLASS 1 OBESITY WITH SERIOUS COMORBIDITY AND BODY MASS INDEX (BMI) OF 34.0 TO 34.9 IN ADULT, UNSPECIFIED OBESITY TYPE: ICD-10-CM

## 2025-04-09 ENCOUNTER — TELEPHONE (OUTPATIENT)
Dept: ENDOCRINOLOGY | Facility: CLINIC | Age: 35
End: 2025-04-09
Payer: COMMERCIAL

## 2025-04-09 RX ORDER — TIRZEPATIDE 10 MG/.5ML
10 INJECTION, SOLUTION SUBCUTANEOUS
Qty: 2 ML | Refills: 1 | Status: SHIPPED | OUTPATIENT
Start: 2025-04-09

## 2025-04-09 NOTE — TELEPHONE ENCOUNTER
Refill request was sent to Dr. Roche already. Please see 4/7/25 refill encounter.       Ashleigh Drake RN  Endocrine Care Coordinator  [facility]

## 2025-04-09 NOTE — TELEPHONE ENCOUNTER
Health Call Center    Phone Message    May a detailed message be left on voicemail: yes     Reason for Call: Patient calling to have to have his mediation refill approved today for Zepbound so that the pharmacy can order it. It takes a week hopefully by Friday. Thank you.     Patient would like to get some refills due to the issues in the pharmacy. Please call to discuss further if needed.     Action Taken: Message routed to:  [facility] ([facility] ENDO    Travel Screening: Not Applicable     Date of Service:

## 2025-04-15 ENCOUNTER — MYC MEDICAL ADVICE (OUTPATIENT)
Dept: FAMILY MEDICINE | Facility: CLINIC | Age: 35
End: 2025-04-15
Payer: COMMERCIAL

## 2025-04-15 DIAGNOSIS — Z87.898 HISTORY OF MOTION SICKNESS: Primary | ICD-10-CM

## 2025-04-15 NOTE — TELEPHONE ENCOUNTER
Dr. Faust,    Please see below MyChart message and advise.  Pended, if approved.   Or could advise VV or Evisit     Thanks,   Betty SIFUENTES RN

## 2025-04-16 RX ORDER — SCOPOLAMINE 1 MG/3D
1 PATCH, EXTENDED RELEASE TRANSDERMAL
Qty: 3 PATCH | Refills: 0 | Status: SHIPPED | OUTPATIENT
Start: 2025-04-16

## 2025-04-17 DIAGNOSIS — R79.89 LOW SERUM TESTOSTERONE: Primary | ICD-10-CM

## 2025-05-06 ENCOUNTER — MYC REFILL (OUTPATIENT)
Dept: ENDOCRINOLOGY | Facility: CLINIC | Age: 35
End: 2025-05-06
Payer: COMMERCIAL

## 2025-05-06 DIAGNOSIS — E66.811 CLASS 1 OBESITY WITH SERIOUS COMORBIDITY AND BODY MASS INDEX (BMI) OF 34.0 TO 34.9 IN ADULT, UNSPECIFIED OBESITY TYPE: ICD-10-CM

## 2025-05-06 RX ORDER — TIRZEPATIDE 10 MG/.5ML
10 INJECTION, SOLUTION SUBCUTANEOUS
Qty: 2 ML | Refills: 1 | Status: CANCELLED | OUTPATIENT
Start: 2025-05-06

## 2025-05-07 RX ORDER — TIRZEPATIDE 10 MG/.5ML
10 INJECTION, SOLUTION SUBCUTANEOUS WEEKLY
Qty: 2 ML | Refills: 0 | Status: SHIPPED | OUTPATIENT
Start: 2025-05-07

## 2025-05-09 DIAGNOSIS — I10 HYPERTENSION GOAL BP (BLOOD PRESSURE) < 130/80: ICD-10-CM

## 2025-05-10 RX ORDER — HYDROCHLOROTHIAZIDE 25 MG/1
25 TABLET ORAL DAILY
Qty: 90 TABLET | Refills: 0 | Status: SHIPPED | OUTPATIENT
Start: 2025-05-10

## 2025-05-14 ENCOUNTER — LAB (OUTPATIENT)
Dept: LAB | Facility: CLINIC | Age: 35
End: 2025-05-14
Payer: COMMERCIAL

## 2025-05-14 ENCOUNTER — ANTICOAGULATION THERAPY VISIT (OUTPATIENT)
Dept: ANTICOAGULATION | Facility: CLINIC | Age: 35
End: 2025-05-14

## 2025-05-14 DIAGNOSIS — Z79.01 CHRONIC ANTICOAGULATION: ICD-10-CM

## 2025-05-14 DIAGNOSIS — Z86.73 HISTORY OF CVA (CEREBROVASCULAR ACCIDENT): ICD-10-CM

## 2025-05-14 DIAGNOSIS — I74.9 EMBOLISM AND THROMBOSIS OF ARTERY (H): ICD-10-CM

## 2025-05-14 DIAGNOSIS — E78.2 MIXED HYPERLIPIDEMIA: ICD-10-CM

## 2025-05-14 DIAGNOSIS — Z86.711 HISTORY OF PULMONARY EMBOLISM: ICD-10-CM

## 2025-05-14 DIAGNOSIS — D68.61 ANTIPHOSPHOLIPID ANTIBODY SYNDROME: Primary | ICD-10-CM

## 2025-05-14 DIAGNOSIS — Z13.6 SCREENING FOR CARDIOVASCULAR CONDITION: ICD-10-CM

## 2025-05-14 DIAGNOSIS — D68.61 ANTIPHOSPHOLIPID SYNDROME: ICD-10-CM

## 2025-05-14 DIAGNOSIS — I10 BENIGN ESSENTIAL HYPERTENSION: Primary | ICD-10-CM

## 2025-05-14 LAB
ANION GAP SERPL CALCULATED.3IONS-SCNC: 12 MMOL/L (ref 7–15)
BUN SERPL-MCNC: 21.8 MG/DL (ref 6–20)
CALCIUM SERPL-MCNC: 9.3 MG/DL (ref 8.8–10.4)
CHLORIDE SERPL-SCNC: 100 MMOL/L (ref 98–107)
CHOLEST SERPL-MCNC: 136 MG/DL
CREAT SERPL-MCNC: 1.18 MG/DL (ref 0.67–1.17)
EGFRCR SERPLBLD CKD-EPI 2021: 82 ML/MIN/1.73M2
FACT X ACT/NOR PPP CHRO: 21 % (ref 70–130)
FASTING STATUS PATIENT QL REPORTED: YES
FASTING STATUS PATIENT QL REPORTED: YES
GLUCOSE SERPL-MCNC: 90 MG/DL (ref 70–99)
HCO3 SERPL-SCNC: 24 MMOL/L (ref 22–29)
HDLC SERPL-MCNC: 34 MG/DL
LDLC SERPL CALC-MCNC: 74 MG/DL
NONHDLC SERPL-MCNC: 102 MG/DL
POTASSIUM SERPL-SCNC: 3.8 MMOL/L (ref 3.4–5.3)
SODIUM SERPL-SCNC: 136 MMOL/L (ref 135–145)
TRIGL SERPL-MCNC: 138 MG/DL

## 2025-05-14 PROCEDURE — 36415 COLL VENOUS BLD VENIPUNCTURE: CPT

## 2025-05-14 PROCEDURE — 80061 LIPID PANEL: CPT

## 2025-05-14 PROCEDURE — 80048 BASIC METABOLIC PNL TOTAL CA: CPT

## 2025-05-14 PROCEDURE — 85260 CLOT FACTOR X STUART-POWER: CPT

## 2025-05-14 NOTE — PROGRESS NOTES
ANTICOAGULATION MANAGEMENT     Tone Marley 35 year old male is on warfarin with therapeutic result. (Goal Chromogenic Factor X 40-20%)    Recent labs: (last 7 days)     05/14/25  0730   [id number] 21*       ASSESSMENT     Warfarin Lab Questionnaire    Warfarin Doses Last 7 Days      5/13/2025     8:13 PM   Dose in Tablet or Mg   TAB or MG? milligram (mg)     Pt Rptd Dose SUNDAY MONDAY TUESDAY WED THURS FRIDAY SATURDAY 5/13/2025   8:13 PM 12.5 12.5 12.5 10 12.5 12.5 12.5         5/13/2025   Warfarin Lab Questionnaire   Missed doses within past 14 days? No   Changes in diet or alcohol within past 14 days? No   Medication changes since last result? No   Injuries or illness since last result? No   New shortness of breath, severe headaches or sudden changes in vision since last result? No   Abnormal bleeding since last result? No   Upcoming surgery, procedure? No     Previous result: Therapeutic last 2(+) visits  Additional findings: None       PLAN     Recommended plan for no diet, medication or health factor changes affecting result    Dosing Instructions: Continue your current warfarin dose with next Chromogenic Factor X in 6 weeks (6/25/25)       Summary  As of 5/14/2025      Full warfarin instructions:  10 mg every Wed; 12.5 mg all other days               Detailed voice message left for Tone with dosing instructions and follow up date.   Sent Oscar message with dosing and follow up instructions    Contact [phone] to schedule and with any changes, questions or concerns.     Education provided:   Please call back if any changes to your diet, medications or how you've been taking warfarin  Goal range and lab monitoring: goal range and significance of current result  Written instructions provided  Contact [phone] with any changes, questions or concerns.     Plan made per [facility] anticoagulation protocol    Abby Chatman, RN  5/14/2025  Anticoagulation Clinic  [facility] for routing messages: [place]do  ANTICOAG [place]  [facility] patient phone line: [phone]

## 2025-05-22 ENCOUNTER — RESULTS FOLLOW-UP (OUTPATIENT)
Dept: FAMILY MEDICINE | Facility: CLINIC | Age: 35
End: 2025-05-22

## 2025-05-22 NOTE — TELEPHONE ENCOUNTER
Triage,  Please see message below.   Patient responded to questions from A.S.  Patient responded to your [employer] message.   Thanks!  Kendra ZAMBRANO

## 2025-06-09 ENCOUNTER — VIRTUAL VISIT (OUTPATIENT)
Dept: FAMILY MEDICINE | Facility: CLINIC | Age: 35
End: 2025-06-09
Payer: COMMERCIAL

## 2025-06-09 DIAGNOSIS — I10 HYPERTENSION GOAL BP (BLOOD PRESSURE) < 130/80: Primary | ICD-10-CM

## 2025-06-09 DIAGNOSIS — R79.89 LOW SERUM TESTOSTERONE: ICD-10-CM

## 2025-06-09 DIAGNOSIS — Z86.711 HISTORY OF PULMONARY EMBOLISM: ICD-10-CM

## 2025-06-09 DIAGNOSIS — Z79.01 CHRONIC ANTICOAGULATION: ICD-10-CM

## 2025-06-09 DIAGNOSIS — R79.89 ABNORMAL SERUM CREATININE LEVEL: ICD-10-CM

## 2025-06-09 DIAGNOSIS — Z86.73 HISTORY OF CVA (CEREBROVASCULAR ACCIDENT): ICD-10-CM

## 2025-06-09 DIAGNOSIS — Z13.1 SCREENING FOR DIABETES MELLITUS: ICD-10-CM

## 2025-06-09 DIAGNOSIS — D68.61 ANTIPHOSPHOLIPID ANTIBODY SYNDROME: ICD-10-CM

## 2025-06-09 PROCEDURE — 98005 SYNCH AUDIO-VIDEO EST LOW 20: CPT | Performed by: FAMILY MEDICINE

## 2025-06-09 RX ORDER — LOSARTAN POTASSIUM 100 MG/1
100 TABLET ORAL DAILY
Qty: 90 TABLET | Refills: 3 | Status: SHIPPED | OUTPATIENT
Start: 2025-06-09

## 2025-06-09 RX ORDER — WARFARIN SODIUM 5 MG/1
TABLET ORAL
Qty: 90 TABLET | Refills: 3 | Status: SHIPPED | OUTPATIENT
Start: 2025-06-09

## 2025-06-09 RX ORDER — HYDROCHLOROTHIAZIDE 25 MG/1
25 TABLET ORAL DAILY
Qty: 90 TABLET | Refills: 3 | Status: SHIPPED | OUTPATIENT
Start: 2025-06-09

## 2025-06-09 RX ORDER — WARFARIN SODIUM 10 MG/1
TABLET ORAL
Qty: 90 TABLET | Refills: 3 | Status: SHIPPED | OUTPATIENT
Start: 2025-06-09

## 2025-06-09 NOTE — PROGRESS NOTES
Tone is a 35 year old who is being evaluated via a billable video visit.          Assessment & Plan   35 year old male with known history of hypertension, Antiphospholipid syndrome, on long term chronic anticoagulation due to history of PE/ stroke- follow up for medications management as below    1. Hypertension goal BP (blood pressure) < 130/80 (Primary)  Plan:has Blood pressure monitor and knows the goal  - losartan (COZAAR) 100 MG tablet; Take 1 tablet (100 mg) by mouth daily.  Dispense: 90 tablet; Refill: 3  - hydrochlorothiazide (HYDRODIURIL) 25 MG tablet; Take 1 tablet (25 mg) by mouth daily.  Dispense: 90 tablet; Refill: 3    2. Low serum testosterone  obesity  Follows Endo    3. Antiphospholipid antibody syndrome  4. Chronic anticoagulation  5. History of pulmonary embolism  6. History of CVA (cerebrovascular accident) 2016 (while on DOAC-xeralto)  - warfarin ANTICOAGULANT (COUMADIN/JANTOVEN) 10 MG tablet; Take one tab daily (with half of a 5 mg tablet to make 12.5 mg daily). Or as directed by INR clinic.  Dispense: 90 tablet; Refill: 3  - warfarin ANTICOAGULANT (COUMADIN/JANTOVEN) 5 MG tablet; Take 1/2 tablet by mouth daily (with 10 mg tablet for a total of 12.5 mg daily). OR as directed by INR clinic.  Dispense: 90 tablet; Refill: 3        7. Abnormal serum creatinine level  He has been on high protein diet- for wt management.     - Cystatin C with GFR; Future  - Basic metabolic panel  (Ca, Cl, CO2, Creat, Gluc, K, Na, BUN); Future    8. Screening for diabetes mellitus  - Hemoglobin A1c; Future              Follow-up  for annual physical       Subjective   Tone is a 35 year old, presenting for the following health issues:  No chief complaint on file.    History of Present Illness       Hyperlipidemia:  He presents for follow up of hyperlipidemia.   He is taking medication to lower cholesterol. He is not having myalgia or other side effects to statin medications.    Hypertension: He presents for  follow up of hypertension.  He does not check blood pressure  regularly outside of the clinic. Outpatient blood pressures have not been over 140/90. He follows a low salt diet.     He eats 2-3 servings of fruits and vegetables daily.He consumes 0 sweetened beverage(s) daily.He exercises with enough effort to increase his heart rate 30 to 60 minutes per day.  He exercises with enough effort to increase his heart rate 3 or less days per week. He is missing 1 dose(s) of medications per week.  He is not taking prescribed medications regularly due to remembering to take.      Medications refill appointment for hypertension and chronic anticoagulation    Working as consultant pharmacist- for [employer]  More travel due to work- wears compression stocking    Needs refills on coummadin  The patient denies abnormal bruising or abnormal bleeding from any body orifice such as bleeding from nose or gums, blood in urine or stool, or melena, hemoptysis or hemetemesis.  Has lab only appointment next week at [facility]    We talked about fasting lipid , low HDL- has plans to start excercise and has been out of omega 3    Sees ENDO for low testoserone and wt management.   Has been on zapbound at 10 mg  Has not seen much weight loss in last few months  A total of 20-25 lbs weight loss on GLP1- in 8 months  Goal weight is below 210- currently on 230    Ran out of omega 3            Review of Systems  Constitutional, HEENT, cardiovascular, pulmonary, GI, , musculoskeletal, neuro, skin, endocrine and psych systems are negative, except as otherwise noted.      Objective           Vitals:  No vitals were obtained today due to virtual visit.    Physical Exam   GENERAL: alert and no distress  EYES: Eyes grossly normal to inspection.  No discharge or erythema, or obvious scleral/conjunctival abnormalities.  RESP: No audible wheeze, cough, or visible cyanosis.    SKIN: Visible skin clear. No significant rash, abnormal pigmentation or  lesions.  NEURO: Cranial nerves grossly intact.  Mentation and speech appropriate for age.  PSYCH: Appropriate affect, tone, and pace of words          Video-Visit Details    Type of service:  Video Visit   Originating Location (pt. Location): Home  Distant Location (provider location):  On-site  Platform used for Video Visit: Lorena  Signed Electronically by: Yara Faust MD

## 2025-06-11 DIAGNOSIS — I10 HYPERTENSION GOAL BP (BLOOD PRESSURE) < 130/80: ICD-10-CM

## 2025-06-11 RX ORDER — LOSARTAN POTASSIUM 100 MG/1
100 TABLET ORAL DAILY
Qty: 90 TABLET | Refills: 3 | OUTPATIENT
Start: 2025-06-11

## 2025-06-13 ENCOUNTER — RESULTS FOLLOW-UP (OUTPATIENT)
Dept: ANTICOAGULATION | Facility: CLINIC | Age: 35
End: 2025-06-13

## 2025-06-17 ENCOUNTER — RESULTS FOLLOW-UP (OUTPATIENT)
Dept: FAMILY MEDICINE | Facility: CLINIC | Age: 35
End: 2025-06-17

## 2025-07-07 DIAGNOSIS — E78.5 HYPERLIPIDEMIA LDL GOAL <100: ICD-10-CM

## 2025-07-07 RX ORDER — ATORVASTATIN CALCIUM 10 MG/1
10 TABLET, FILM COATED ORAL DAILY
Qty: 90 TABLET | Refills: 2 | Status: SHIPPED | OUTPATIENT
Start: 2025-07-07

## 2025-07-13 ENCOUNTER — HEALTH MAINTENANCE LETTER (OUTPATIENT)
Age: 35
End: 2025-07-13

## 2025-07-23 ENCOUNTER — LAB (OUTPATIENT)
Dept: LAB | Facility: CLINIC | Age: 35
End: 2025-07-23
Payer: COMMERCIAL

## 2025-07-23 ENCOUNTER — MYC REFILL (OUTPATIENT)
Dept: ENDOCRINOLOGY | Facility: CLINIC | Age: 35
End: 2025-07-23

## 2025-07-23 ENCOUNTER — VIRTUAL VISIT (OUTPATIENT)
Dept: ENDOCRINOLOGY | Facility: CLINIC | Age: 35
End: 2025-07-23
Payer: COMMERCIAL

## 2025-07-23 DIAGNOSIS — Z79.01 CHRONIC ANTICOAGULATION: ICD-10-CM

## 2025-07-23 DIAGNOSIS — E66.811 CLASS 1 OBESITY WITH SERIOUS COMORBIDITY AND BODY MASS INDEX (BMI) OF 34.0 TO 34.9 IN ADULT, UNSPECIFIED OBESITY TYPE: ICD-10-CM

## 2025-07-23 DIAGNOSIS — E22.1 HYPERPROLACTINEMIA: ICD-10-CM

## 2025-07-23 DIAGNOSIS — Z86.73 HISTORY OF CVA (CEREBROVASCULAR ACCIDENT): ICD-10-CM

## 2025-07-23 DIAGNOSIS — R79.89 LOW SERUM TESTOSTERONE: ICD-10-CM

## 2025-07-23 DIAGNOSIS — E03.9 HYPOTHYROIDISM, UNSPECIFIED TYPE: ICD-10-CM

## 2025-07-23 DIAGNOSIS — E66.811 CLASS 1 OBESITY WITH SERIOUS COMORBIDITY AND BODY MASS INDEX (BMI) OF 34.0 TO 34.9 IN ADULT, UNSPECIFIED OBESITY TYPE: Primary | ICD-10-CM

## 2025-07-23 DIAGNOSIS — I74.9 EMBOLISM AND THROMBOSIS OF ARTERY (H): ICD-10-CM

## 2025-07-23 LAB
HCT VFR BLD AUTO: 42.4 % (ref 40–53)
HGB BLD-MCNC: 15 G/DL (ref 13.3–17.7)
MCV RBC AUTO: 83 FL (ref 78–100)
PROLACTIN SERPL 3RD IS-MCNC: 13 NG/ML (ref 4–15)
SHBG SERPL-SCNC: 22 NMOL/L (ref 11–80)
T4 FREE SERPL-MCNC: 1.31 NG/DL (ref 0.9–1.7)
TSH SERPL DL<=0.005 MIU/L-ACNC: 4.68 UIU/ML (ref 0.3–4.2)

## 2025-07-23 PROCEDURE — 36415 COLL VENOUS BLD VENIPUNCTURE: CPT

## 2025-07-23 PROCEDURE — 85014 HEMATOCRIT: CPT

## 2025-07-23 PROCEDURE — 85260 CLOT FACTOR X STUART-POWER: CPT

## 2025-07-23 PROCEDURE — 98007 SYNCH AUDIO-VIDEO EST HI 40: CPT | Performed by: STUDENT IN AN ORGANIZED HEALTH CARE EDUCATION/TRAINING PROGRAM

## 2025-07-23 PROCEDURE — G2211 COMPLEX E/M VISIT ADD ON: HCPCS | Performed by: STUDENT IN AN ORGANIZED HEALTH CARE EDUCATION/TRAINING PROGRAM

## 2025-07-23 PROCEDURE — 84146 ASSAY OF PROLACTIN: CPT

## 2025-07-23 PROCEDURE — 84443 ASSAY THYROID STIM HORMONE: CPT

## 2025-07-23 PROCEDURE — 84439 ASSAY OF FREE THYROXINE: CPT

## 2025-07-23 PROCEDURE — 85018 HEMOGLOBIN: CPT

## 2025-07-23 PROCEDURE — 84270 ASSAY OF SEX HORMONE GLOBUL: CPT

## 2025-07-23 NOTE — PROGRESS NOTES
Endocrinology Clinic Visit       Video-Visit Details    Type of service:  Video Visit  Joined the call at 7/23/2025, 7:37:23 am.  Left the call at 7/23/2025, 7:54:27 am.      Originating Location (pt. Location): Home        Distant Location (provider location):  Off-site    Mode of Communication:  Video Conference via [email or web address]    Physician has received verbal consent for a Video Visit from the patient? Yes       NAME:  Tone Marley  PCP:  Yara Faust  MRN:  [id number]  Reason for Consult: Hypogonadism  Requesting Provider:  No ref. provider found    Chief Complaint     Chief Complaint   Patient presents with    RECHECK       History of Present Illness     Tone Marley is a 34 year old male who is seen in video visit for hypogonadism.    He was previously seen by Dr. Casey in October 2019 and with Dr. Contreras. He established with me 4/2024.    He has a hx of antiphospholipid syndrome and is on anticoagulation for this. This was diagnosed at age 13 after he had a DVT in the lower extremity and PE.  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  he had headache, vision and speech abnormalities, sx improved with therapy.     Prior to therapy with testosterone he was having low libido, low energy, and difficulty losing weight.  I reviewed all his testosterone levels. Low Testosterone Total 200 (240-950) on 8/20/19 at 9:28am, 164 on 8/7/18 at 11:15 am, 124 on 3/8/18 at 7:47 am. He was seen by Dr. Zuleta 10/2019, pituitary access were checked:      Latest Reference Range & Units 10/30/19 07:40 10/30/19 07:41   Adrenal Corticotropin <47 pg/mL  48 (H)   Cortisol Serum 4 - 22 ug/dL  20.0   Free Testosterone Calculated 4.7 - 24.4 ng/dL 7.88    FSH 0.7 - 10.8 IU/L 2.8    Growth Hormone <1.3 ug/L <0.1    Prolactin 2 - 18 ug/L 25 (H)    PSA 0 - 4 ug/L 0.59    T4 Free  0.76 - 1.46 ng/dL 1.03    Testosterone Total 240 - 950 ng/dL 259    TSH 0.40 - 4.00 mU/L 4.44 (H)         Brain Mri 1/2020: showed a normal pituitary gland.  Repeat MRI 10/2023: no evidence of pituitary adenoma.    He initial started on T injections end of 2019 then switched to androgel.     He feels good since being on testosterone. He had LUTS when he was started on T injections in 2019. He also does not like needles and that's why he prefers topical.    No other side effects since starting T.    Had low-normal testosterone on 2 pumps/day androgel but appears to be supratherapeutic on 4 ppd.  currently on 3 pumps /day.     Never fathered any children. He is interested in fertility in the future.    Last T level and hematocrit 4/2025  wnl.    Today he reported feeling good.  No concerns.      # elevated PRL  Elevated PRL level without galactorrhea or gynecomastia.  No adenoma seen on MRI in 2020 when level was around 25.  PRL trended up to 55 on 9/2023 and 70 on 1/2024. PRL 73 on 8/2024 no macroprolactin.    He started cabergoline 0.25 mg weekly on 8/2024.     Repeat PRL 27 on 9/2024.    Denied side effects on cabergoline.    PRL remains above normal at 19 on 1/2025. Cabergoline increased to 0.5 mg twice a week. PRL remained at 19 on 4/2025.    # obesity class 1  Wt was 245 lbs 4/2024.      He started zepbound, went up to 10 mg weekly since 5/2025.   He reported nausea and reflux on 7.5 mg dose. He lost 15 lbs on it.    Interval hx: today he said he is going to the gym more. He lost 5-10 lbs more. No more nausea.    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     Problem List     Patient Active Problem List   Diagnosis    Antiphospholipid syndrome    Mixed hyperlipidemia    Benign essential hypertension    Attention deficit hyperactivity disorder (ADHD), predominantly inattentive type    History of pulmonary embolism    Chronic anticoagulation    Class 2 obesity due to excess calories without  serious comorbidity with body mass index (BMI) of 37.0 to 37.9 in adult    Obesity (BMI 35.0-39.9) with comorbidity (H)    Antiphospholipid antibody syndrome    History of CVA (cerebrovascular accident)    Low serum testosterone    Rosacea    Hyperprolactinemia    Embolism and thrombosis of artery (H)        Medications     Current Outpatient Medications   Medication Sig Dispense Refill    tirzepatide-Weight Management (ZEPBOUND) 12.5 MG/0.5ML prefilled pen Inject 0.5 mLs (12.5 mg) subcutaneously every 7 days. 2 mL 2    ASPIRIN PO Take 81 mg by mouth daily      atorvastatin (LIPITOR) 10 MG tablet TAKE 1 TABLET(10 MG) BY MOUTH DAILY 90 tablet 2    cabergoline (DOSTINEX) 0.5 MG tablet Take 1 tablet (0.5 mg) by mouth twice a week. 12 tablet 2    hydrochlorothiazide (HYDRODIURIL) 25 MG tablet Take 1 tablet (25 mg) by mouth daily. 90 tablet 3    losartan (COZAAR) 100 MG tablet Take 1 tablet (100 mg) by mouth daily. 90 tablet 3    Omega-3 Fatty Acids (FISH OIL PO)       testosterone (ANDROGEL 1.62 % PUMP) 20.25 MG/ACT gel Place 3 Pump (60.75 mg) onto the skin daily. Apply from dispenser to clean, dry, intact skin of the upper arms and shoulders. 375 g 1    warfarin ANTICOAGULANT (COUMADIN/JANTOVEN) 10 MG tablet Take one tab daily (with half of a 5 mg tablet to make 12.5 mg daily). Or as directed by INR clinic. 90 tablet 3    warfarin ANTICOAGULANT (COUMADIN/JANTOVEN) 5 MG tablet Take 1/2 tablet by mouth daily (with 10 mg tablet for a total of 12.5 mg daily). OR as directed by INR clinic. 90 tablet 3     No current facility-administered medications for this visit.        Allergies     Allergies   Allergen Reactions    Nkda [No Known Drug Allergy]        Medical / Surgical History     Past Medical History:   Diagnosis Date    ADHD (attention deficit hyperactivity disorder) 6/27/2012    Antiphospholipid antibody syndromes     at age 13    Antiphospholipid syndrome 2/2/2012    On anticoagulation coumdin 10 mg once daily       Cerebral infarction (H) 7/1/18    Vertebral artery dissection    History of pulmonary embolism 9/2/2013    Was diagnosed  Positive CT scan in emergency department   now Under care of Dr Turcios at [facility],[phone] His nurse personal line [phone] Was on lovenox for 5 months  Now  For past one month on xareltlo 20 mg once daily and plaqunil twice daily      Hyperlipidemia LDL goal <130 6/11/2013    Hypertension goal BP (blood pressure) < 140/90 6/19/12    Hypertension, uncontrolled 6/27/2012    Postphlebitic syndrome 12/9/2014    Prehypertension 2/2/2012    Pulmonary embolism (H)     at age 13    Pulmonary embolism (H) 1/11/2015     Past Surgical History:   Procedure Laterality Date    wisdom teeth extraction         Social History     Social History     Socioeconomic History    Marital status: Single     Spouse name: Not on file    Number of children: Not on file    Years of education: Not on file    Highest education level: Not on file   Occupational History     Employer: [employer]   Tobacco Use    Smoking status: Never    Smokeless tobacco: Former     Types: Chew     Quit date: 07/2018    Tobacco comments:     Chew maybe once per week.   Vaping Use    Vaping status: Never Used   Substance and Sexual Activity    Alcohol use: Yes     Comment: occasional    Drug use: No    Sexual activity: Yes     Partners: Female     Birth control/protection: I.U.D.   Other Topics Concern    Parent/sibling w/ CABG, MI or angioplasty before 65F 55M? 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 Paternal Uncle 42    Other Cancer Maternal Grandmother         Lung cancer (non-smoker)       ROS     12 ROS completed, pertinent positive and negative in HPI    Physical Exam   There were no vitals taken for this visit.   GENERAL: alert and no distress  EYES: Eyes grossly normal to inspection.  No discharge or erythema, or obvious scleral/conjunctival abnormalities.  RESP: No audible wheeze, cough, or visible cyanosis.    SKIN: Visible skin clear. No significant rash, abnormal pigmentation or lesions.  NEURO: Cranial nerves grossly intact.  Mentation and speech appropriate for age.  PSYCH: Appropriate affect, tone, and pace of words     Labs/Imaging     Pertinent Labs were reviewed and updated in EPIC and discussed briefly.  Radiology Results were  reviewed and updated in EPIC and discussed briefly.    Summary of recent findings:   Lab Results   Component Value Date    A1C 4.9 05/31/2022       TSH   Date Value Ref Range Status   08/14/2024 4.56 (H) 0.30 - 4.20 uIU/mL Final   01/10/2024 1.98 0.30 - 4.20 uIU/mL Final   11/08/2023 5.08 (H) 0.30 - 4.20 uIU/mL Final   12/27/2022 4.34 (H) 0.30 - 4.20 uIU/mL Final   10/30/2019 4.44 (H) 0.40 - 4.00 mU/L Final   02/21/2018 3.41 0.40 - 4.00 mU/L Final   08/18/2016 1.86 0.40 - 4.00 mU/L Final     T4 Free   Date Value Ref Range Status   10/30/2019 1.03 0.76 - 1.46 ng/dL Final     Free T4   Date Value Ref Range Status   08/14/2024 1.27 0.90 - 1.70 ng/dL Final   11/08/2023 1.30 0.90 - 1.70 ng/dL Final   12/27/2022 1.20 0.90 - 1.70 ng/dL Final       Creatinine   Date Value Ref Range Status   06/13/2025 1.16 0.67 -  "1.17 mg/dL Final   09/22/2020 1.11 0.66 - 1.25 mg/dL Final       Recent Labs   Lab Test 04/26/23  0858 01/05/22  0824   CHOL 136 155   HDL 44 46   LDL 73 81   TRIG 93 138       No results found for: \"[id number]\", \"[id number]\", \"[id number]\"    I personally reviewed the patient's outside records from [facility] EMR and Care Everywhere. Summary of pertinent findings in HPI.    Impression / Plan       1) hypogonadotropic Hypogonadism  2) hyperprolactinemia  3) interested in fertility  Initial testing with low normal T level and inappropriately normal gonadotropins, thought to be related to his CVA.  He did have slightly elevated prolactin which has been trending up with known normal pituitary on imaging.He has been on testosterone replacement since 2019, he is interested in future fertility.      His prolactin level has been trending up, although there is no lesion on the pituitary on most recent brain MRI this still could be a microadenoma not seen on imaging. Started on cabergoline low dose on 8/2024. Responded well PRL on recheck 9/2024.   Prl was still slightly above normal, we increased cabergoline to 0.5 mg twice a week .    Plan:  Checking PRL, Testosterone levels, PSA and hematocrit     4) subclinical hypothyroidism  Very mild subclinical hypothyroidism.  He reported he did not tolerate levothyroxine and prefers to stay off of it which is reasonable given his TSH mildly elevated and less than 10.  Continue to monitor.    5) obesity class 1 , BMI 34  6) hx of CVA  He failed lifestyle changes for wt. Started on zepbound, responding well. Has nausea as side effect on 7.5 mg weekly but it is gone. Currently on 10 mg , reported wt loss has plateau.  Plan:  - increase mounjaro to 12.5 mg weekly.    Test and/or medications prescribed today:  No orders of the defined types were placed in this encounter.        Follow up: 6 month    The longitudinal plan of care for the diagnosis(es)/condition(s) as documented were addressed " during this visit. Due to the added complexity in care, I will continue to support Tone in the subsequent management and with ongoing continuity of care.    Toshia Roche MD  Endocrinology, Diabetes and Metabolism  [facility]

## 2025-07-23 NOTE — NURSING NOTE
Current patient location: [address]   [facility] [address]    Is the patient currently in the state of [place]? YES    Visit mode: VIDEO    If the visit is dropped, the patient can be reconnected by:VIDEO VISIT: Text to cell phone:   Telephone Information:   Mobile [phone]       Will anyone else be joining the visit? NO  (If patient encounters technical issues they should call [phone])    Are changes needed to the allergy or medication list? No and Pt stated no changes to allergies    Are refills needed on medications prescribed by this physician? NO    Rooming Documentation:  Not applicable    Reason for visit: RECHTRUDY DOAN

## 2025-07-24 ENCOUNTER — ANTICOAGULATION THERAPY VISIT (OUTPATIENT)
Dept: ANTICOAGULATION | Facility: CLINIC | Age: 35
End: 2025-07-24
Payer: COMMERCIAL

## 2025-07-24 ENCOUNTER — DOCUMENTATION ONLY (OUTPATIENT)
Dept: ANTICOAGULATION | Facility: CLINIC | Age: 35
End: 2025-07-24
Payer: COMMERCIAL

## 2025-07-24 DIAGNOSIS — Z79.01 CHRONIC ANTICOAGULATION: ICD-10-CM

## 2025-07-24 DIAGNOSIS — Z86.73 HISTORY OF CVA (CEREBROVASCULAR ACCIDENT): ICD-10-CM

## 2025-07-24 DIAGNOSIS — D68.61 ANTIPHOSPHOLIPID SYNDROME: ICD-10-CM

## 2025-07-24 DIAGNOSIS — Z86.711 HISTORY OF PULMONARY EMBOLISM: Primary | ICD-10-CM

## 2025-07-24 DIAGNOSIS — D68.61 ANTIPHOSPHOLIPID ANTIBODY SYNDROME: Primary | ICD-10-CM

## 2025-07-24 DIAGNOSIS — I74.9 EMBOLISM AND THROMBOSIS OF ARTERY (H): ICD-10-CM

## 2025-07-24 DIAGNOSIS — Z86.711 HISTORY OF PULMONARY EMBOLISM: ICD-10-CM

## 2025-07-24 DIAGNOSIS — D68.61 ANTIPHOSPHOLIPID ANTIBODY SYNDROME: ICD-10-CM

## 2025-07-24 LAB — FACT X ACT/NOR PPP CHRO: 16 % (ref 70–130)

## 2025-07-24 NOTE — PROGRESS NOTES
ANTICOAGULATION MANAGEMENT     Tone PARDO Donell 35 year old male is on warfarin with supratherapeutic result. (Goal Chromogenic Factor X 40-20%)    Recent labs: (last 7 days)     07/23/25  0824   [id number] 16*       ASSESSMENT     Warfarin Lab Questionnaire    Warfarin Doses Last 7 Days      7/23/2025     7:24 AM   Dose in Tablet or Mg   TAB or MG? milligram (mg)     Pt Rptd Dose SUNDAY MONDAY TUESDAY WED THURS FRIDAY SATURDAY 7/23/2025   7:24 AM 12.5 12.5 12.5 10 12.5 12.5 12.5         7/23/2025   Warfarin Lab Questionnaire   Missed doses within past 14 days? No   Changes in diet or alcohol within past 14 days? No-- YES, decreased intake overall while on Zepbound   Medication changes since last result? No-- YES, has been taking Zepbound for ~5 months now, but increased dose yesterday   Injuries or illness since last result? No   New shortness of breath, severe headaches or sudden changes in vision since last result? No   Abnormal bleeding since last result? No   Upcoming surgery, procedure? No     Previous result: Therapeutic last 2(+) visits  Additional findings: has lost ~25 pounds since starting Zepbound in April     PLAN     Recommended plan for ongoing change(s) affecting result    Dosing Instructions: decrease your warfarin dose (5.9% change) with next Chromogenic Factor X in 2 weeks          Summary  As of 7/24/2025      Full warfarin instructions:  10 mg every Mon, Wed, Fri; 12.5 mg all other days               Telephone call with Tone who agrees to plan and repeated back plan correctly    Lab visit scheduled    Education provided:   Contact [phone] with any changes, questions or concerns.     Plan made per [facility] anticoagulation protocol    Maria Teresa Sierra, RN  7/24/2025  Anticoagulation Clinic  [place] for routing messages: p ANTICOAG [place]  [facility] patient phone line: [phone]

## 2025-07-27 LAB
TESTOST FREE SERPL-MCNC: 23.19 NG/DL
TESTOST SERPL-MCNC: 826 NG/DL (ref 240–950)

## 2025-08-06 ENCOUNTER — ANTICOAGULATION THERAPY VISIT (OUTPATIENT)
Dept: ANTICOAGULATION | Facility: CLINIC | Age: 35
End: 2025-08-06

## 2025-08-06 ENCOUNTER — LAB (OUTPATIENT)
Dept: LAB | Facility: CLINIC | Age: 35
End: 2025-08-06
Payer: COMMERCIAL

## 2025-08-06 DIAGNOSIS — D68.61 ANTIPHOSPHOLIPID ANTIBODY SYNDROME: Primary | ICD-10-CM

## 2025-08-06 DIAGNOSIS — Z86.711 HISTORY OF PULMONARY EMBOLISM: ICD-10-CM

## 2025-08-06 DIAGNOSIS — D68.61 ANTIPHOSPHOLIPID SYNDROME: ICD-10-CM

## 2025-08-06 DIAGNOSIS — I74.9 EMBOLISM AND THROMBOSIS OF ARTERY (H): ICD-10-CM

## 2025-08-06 DIAGNOSIS — Z86.73 HISTORY OF CVA (CEREBROVASCULAR ACCIDENT): ICD-10-CM

## 2025-08-06 DIAGNOSIS — D68.61 ANTIPHOSPHOLIPID ANTIBODY SYNDROME: ICD-10-CM

## 2025-08-06 DIAGNOSIS — Z79.01 CHRONIC ANTICOAGULATION: ICD-10-CM

## 2025-08-06 LAB — FACT X ACT/NOR PPP CHRO: 24 % (ref 70–130)

## 2025-08-06 PROCEDURE — 85260 CLOT FACTOR X STUART-POWER: CPT

## 2025-08-06 PROCEDURE — 36415 COLL VENOUS BLD VENIPUNCTURE: CPT

## 2025-08-25 ENCOUNTER — MYC REFILL (OUTPATIENT)
Dept: ENDOCRINOLOGY | Facility: CLINIC | Age: 35
End: 2025-08-25
Payer: COMMERCIAL

## 2025-08-25 DIAGNOSIS — E66.811 CLASS 1 OBESITY WITH SERIOUS COMORBIDITY AND BODY MASS INDEX (BMI) OF 34.0 TO 34.9 IN ADULT, UNSPECIFIED OBESITY TYPE: ICD-10-CM

## 2025-08-27 ENCOUNTER — ANTICOAGULATION THERAPY VISIT (OUTPATIENT)
Dept: ANTICOAGULATION | Facility: CLINIC | Age: 35
End: 2025-08-27

## 2025-08-27 ENCOUNTER — LAB (OUTPATIENT)
Dept: LAB | Facility: CLINIC | Age: 35
End: 2025-08-27
Payer: COMMERCIAL

## 2025-08-27 DIAGNOSIS — D68.61 ANTIPHOSPHOLIPID ANTIBODY SYNDROME: ICD-10-CM

## 2025-08-27 DIAGNOSIS — D68.61 ANTIPHOSPHOLIPID SYNDROME: ICD-10-CM

## 2025-08-27 DIAGNOSIS — Z86.711 HISTORY OF PULMONARY EMBOLISM: ICD-10-CM

## 2025-08-27 DIAGNOSIS — Z79.01 CHRONIC ANTICOAGULATION: ICD-10-CM

## 2025-08-27 DIAGNOSIS — Z86.73 HISTORY OF CVA (CEREBROVASCULAR ACCIDENT): ICD-10-CM

## 2025-08-27 DIAGNOSIS — I74.9 EMBOLISM AND THROMBOSIS OF ARTERY (H): ICD-10-CM

## 2025-08-27 DIAGNOSIS — D68.61 ANTIPHOSPHOLIPID ANTIBODY SYNDROME: Primary | ICD-10-CM

## 2025-08-27 LAB — FACT X ACT/NOR PPP CHRO: 17 % (ref 70–130)

## 2025-08-27 PROCEDURE — 36415 COLL VENOUS BLD VENIPUNCTURE: CPT

## 2025-08-27 PROCEDURE — 85260 CLOT FACTOR X STUART-POWER: CPT
